# Patient Record
Sex: FEMALE | NOT HISPANIC OR LATINO | Employment: FULL TIME | URBAN - METROPOLITAN AREA
[De-identification: names, ages, dates, MRNs, and addresses within clinical notes are randomized per-mention and may not be internally consistent; named-entity substitution may affect disease eponyms.]

---

## 2017-04-14 ENCOUNTER — ALLIED HEALTH/NURSE VISIT (OUTPATIENT)
Dept: NURSING | Facility: CLINIC | Age: 42
End: 2017-04-14
Payer: COMMERCIAL

## 2017-04-14 DIAGNOSIS — E53.8 B12 DEFICIENCY: Primary | ICD-10-CM

## 2017-04-14 PROCEDURE — 99207 ZZC NO CHARGE NURSE ONLY: CPT

## 2017-04-14 PROCEDURE — 96372 THER/PROPH/DIAG INJ SC/IM: CPT

## 2017-04-14 NOTE — PROGRESS NOTES
The following medication was given:     MEDICATION: Vitamin B12  1000mcg  ROUTE: IM  SITE: Deltoid - Left  DOSE: 1 ml  LOT #: [id number]  :  [employer]  EXPIRATION DATE:  7/2018  NDC#: 9518-7517-59  PROVIDER: Anh Brooks CMA

## 2017-04-14 NOTE — MR AVS SNAPSHOT
After Visit Summary   4/14/2017    Jeanne Wasserman    MRN: [id number]           Patient Information     Date Of Birth          1975        Visit Information        Provider Department      4/14/2017 3:30 PM BE ANCILLARY [facility]        Today's Diagnoses     B12 deficiency    -  1       Follow-ups after your visit        Who to contact     If you have questions or need follow up information about today's clinic visit or your schedule please contact [facility] [place] directly at [phone].  Normal or non-critical lab and imaging results will be communicated to you by MyChart, letter or phone within 4 business days after the clinic has received the results. If you do not hear from us within 7 days, please contact the clinic through [employer] or phone. If you have a critical or abnormal lab result, we will notify you by phone as soon as possible.  Submit refill requests through [employer] or call your pharmacy and they will forward the refill request to us. Please allow 3 business days for your refill to be completed.          Additional Information About Your Visit        MyChart Information     [employer] gives you secure access to your electronic health record. If you see a primary care provider, you can also send messages to your care team and make appointments. If you have questions, please call your primary care clinic.  If you do not have a primary care provider, please call [phone] and they will assist you.        Care EveryWhere ID     This is your Care EveryWhere ID. This could be used by other organizations to access your [facility] medical records  [id number]         Blood Pressure from Last 3 Encounters:   12/26/16 120/72   12/06/16 115/75   11/21/16 129/85    Weight from Last 3 Encounters:   06/16/16 197 lb (89.4 kg)   06/06/16 197 lb (89.4 kg)              We Performed the Following     B12 - 1000 MCG     INJECTION INTRAMUSCULAR OR SUB-Q        Primary Care Provider  Office Phone # Fax #    Anh Cortez PA-C [phone]       [facility] [address]  [place] [address]        Thank you!     [facility] you [facility] your care. Our goal is always to provide you with excellent care. Hearing back from our patients is one way we can continue to improve our services. Please take a few minutes to complete the written survey that you may receive in the mail after your visit with us. Thank you!             Your Updated Medication List - Protect others around you: Learn how to safely use, store and throw away your medicines at www.disposemymeds.org.          This list is accurate as of: 4/14/17  3:45 PM.  Always use your most recent med list.                   Brand Name Dispense Instructions for use    acetaminophen-caffeine 500-65 MG Tabs    EXCEDRIN TENSION HEADACHE     Take 2 tablets by mouth as needed for mild pain       cyanocobalamin 1000 MCG/ML injection    VITAMIN B12    1 mL    Inject 1 mL (1,000 mcg) into the muscle every 30 days for 12 doses       ferrous fumarate 65 mg ([place]. FE)-Vitamin C 125 mg  MG Tabs tablet    VITRON C    90 tablet    Take 1 tablet by mouth daily       Multi-vitamin Tabs tablet      Take 1 tablet by mouth daily       silver sulfADIAZINE 1 % cream    SILVADENE    25 g    Apply topically daily       topiramate 200 MG tablet    TOPAMAX    60 tablet    Take 1 tablet (200 mg) by mouth 2 times daily

## 2017-05-26 ENCOUNTER — ALLIED HEALTH/NURSE VISIT (OUTPATIENT)
Dept: NURSING | Facility: CLINIC | Age: 42
End: 2017-05-26
Payer: COMMERCIAL

## 2017-05-26 DIAGNOSIS — D50.9 IRON DEFICIENCY ANEMIA, UNSPECIFIED IRON DEFICIENCY ANEMIA TYPE: ICD-10-CM

## 2017-05-26 DIAGNOSIS — Z98.84 HISTORY OF GASTRIC BYPASS: ICD-10-CM

## 2017-05-26 DIAGNOSIS — D50.9 IRON DEFICIENCY ANEMIA: Primary | ICD-10-CM

## 2017-05-26 DIAGNOSIS — E53.8 B12 DEFICIENCY: ICD-10-CM

## 2017-05-26 LAB
BASOPHILS # BLD AUTO: 0 10E9/L (ref 0–0.2)
BASOPHILS NFR BLD AUTO: 0.3 %
DIFFERENTIAL METHOD BLD: ABNORMAL
EOSINOPHIL # BLD AUTO: 0 10E9/L (ref 0–0.7)
EOSINOPHIL NFR BLD AUTO: 0.6 %
ERYTHROCYTE [DISTWIDTH] IN BLOOD BY AUTOMATED COUNT: 18.7 % (ref 10–15)
FERRITIN SERPL-MCNC: 6 NG/ML (ref 12–150)
HCT VFR BLD AUTO: 36.2 % (ref 35–47)
HGB BLD-MCNC: 11.4 G/DL (ref 11.7–15.7)
IRON SATN MFR SERPL: 9 % (ref 15–46)
IRON SERPL-MCNC: 41 UG/DL (ref 35–180)
LYMPHOCYTES # BLD AUTO: 2.7 10E9/L (ref 0.8–5.3)
LYMPHOCYTES NFR BLD AUTO: 37.4 %
MCH RBC QN AUTO: 23.7 PG (ref 26.5–33)
MCHC RBC AUTO-ENTMCNC: 31.5 G/DL (ref 31.5–36.5)
MCV RBC AUTO: 75 FL (ref 78–100)
MONOCYTES # BLD AUTO: 0.8 10E9/L (ref 0–1.3)
MONOCYTES NFR BLD AUTO: 10.5 %
NEUTROPHILS # BLD AUTO: 3.7 10E9/L (ref 1.6–8.3)
NEUTROPHILS NFR BLD AUTO: 51.2 %
PLATELET # BLD AUTO: 271 10E9/L (ref 150–450)
RBC # BLD AUTO: 4.82 10E12/L (ref 3.8–5.2)
TIBC SERPL-MCNC: 438 UG/DL (ref 240–430)
VIT B12 SERPL-MCNC: >6000 PG/ML (ref 193–986)
WBC # BLD AUTO: 7.2 10E9/L (ref 4–11)

## 2017-05-26 PROCEDURE — 83550 IRON BINDING TEST: CPT | Performed by: PHYSICIAN ASSISTANT

## 2017-05-26 PROCEDURE — 36415 COLL VENOUS BLD VENIPUNCTURE: CPT | Performed by: PHYSICIAN ASSISTANT

## 2017-05-26 PROCEDURE — 82728 ASSAY OF FERRITIN: CPT | Performed by: PHYSICIAN ASSISTANT

## 2017-05-26 PROCEDURE — 83540 ASSAY OF IRON: CPT | Performed by: PHYSICIAN ASSISTANT

## 2017-05-26 PROCEDURE — 96372 THER/PROPH/DIAG INJ SC/IM: CPT

## 2017-05-26 PROCEDURE — 82607 VITAMIN B-12: CPT | Performed by: PHYSICIAN ASSISTANT

## 2017-05-26 PROCEDURE — 99207 ZZC NO CHARGE NURSE ONLY: CPT

## 2017-05-26 PROCEDURE — 85025 COMPLETE CBC W/AUTO DIFF WBC: CPT | Performed by: PHYSICIAN ASSISTANT

## 2017-05-26 RX ORDER — CYANOCOBALAMIN 1000 UG/ML
1 INJECTION, SOLUTION INTRAMUSCULAR; SUBCUTANEOUS
Qty: 1 ML | Refills: 11 | OUTPATIENT
Start: 2017-05-26 | End: 2017-05-30

## 2017-05-26 NOTE — PROGRESS NOTES
The following medication was given:     MEDICATION: Vitamin B12  1000mcg  ROUTE: IM  SITE: Deltoid - Right  DOSE: 1 ml  LOT #: [id number]  :  [address]  EXPIRATION DATE:  9/2018  NDC#: 7739-8035-61  TANIYA Brooks

## 2017-05-26 NOTE — MR AVS SNAPSHOT
After Visit Summary   5/26/2017    Jeanne Wasserman    MRN: [id number]           Patient Information     Date Of Birth          1975        Visit Information        Provider Department      5/26/2017 2:45 PM [facility]        Today's Diagnoses     Iron deficiency anemia    -  1       Follow-ups after your visit        Your next 10 appointments already scheduled     May 26, 2017  2:45 PM CDT   Nurse Only with BE ANCILLARY   [facility] [place] ([facility] [place])    [address]  [place] [address] [phone]              Who to contact     If you have questions or need follow up information about today's clinic visit or your schedule please contact [facility] directly at [phone].  Normal or non-critical lab and imaging results will be communicated to you by [employer], letter or phone within 4 business days after the clinic has received the results. If you do not hear from us within 7 days, please contact the clinic through [employer] or phone. If you have a critical or abnormal lab result, we will notify you by phone as soon as possible.  Submit refill requests through [employer] or call your pharmacy and they will forward the refill request to us. Please allow 3 business days for your refill to be completed.          Additional Information About Your Visit        MyChart Information     [employer] gives you secure access to your electronic health record. If you see a primary care provider, you can also send messages to your care team and make appointments. If you have questions, please call your primary care clinic.  If you do not have a primary care provider, please call [phone] and they will assist you.        Care EveryWhere ID     This is your Care EveryWhere ID. This could be used by other organizations to access your [facility] medical records  [id number]         Blood Pressure from Last 3 Encounters:   12/26/16 120/72    12/06/16 115/75   11/21/16 129/85    Weight from Last 3 Encounters:   06/16/16 197 lb (89.4 kg)   06/06/16 197 lb (89.4 kg)              We Performed the Following     B12 - 1000 MCG     INJECTION INTRAMUSCULAR OR SUB-Q        Primary Care Provider Office Phone # Fax #    Anh Cortez PA-C [phone]       [facility] [address] [facility] [address]        Thank you!     Thank you for choosing [facility]  for your care. Our goal is always to provide you with excellent care. Hearing back from our patients is one way we can continue to improve our services. Please take a few minutes to complete the written survey that you may receive in the mail after your visit with us. Thank you!             Your Updated Medication List - Protect others around you: Learn how to safely use, store and throw away your medicines at www.disposemymeds.org.          This list is accurate as of: 5/26/17  2:38 PM.  Always use your most recent med list.                   Brand Name Dispense Instructions for use    acetaminophen-caffeine 500-65 MG Tabs    EXCEDRIN TENSION HEADACHE     Take 2 tablets by mouth as needed for mild pain       cyanocobalamin 1000 MCG/ML injection    VITAMIN B12    1 mL    Inject 1 mL (1,000 mcg) into the muscle every 30 days for 12 doses       ferrous fumarate 65 mg ([place]. FE)-Vitamin C 125 mg  MG Tabs tablet    VITRON C    90 tablet    Take 1 tablet by mouth daily       Multi-vitamin Tabs tablet      Take 1 tablet by mouth daily       silver sulfADIAZINE 1 % cream    SILVADENE    25 g    Apply topically daily       topiramate 200 MG tablet    TOPAMAX    60 tablet    Take 1 tablet (200 mg) by mouth 2 times daily

## 2017-06-03 ENCOUNTER — HEALTH MAINTENANCE LETTER (OUTPATIENT)
Age: 42
End: 2017-06-03

## 2017-10-19 ENCOUNTER — MYC MEDICAL ADVICE (OUTPATIENT)
Dept: FAMILY MEDICINE | Facility: CLINIC | Age: 42
End: 2017-10-19

## 2017-10-19 DIAGNOSIS — M54.2 NECK PAIN: Primary | ICD-10-CM

## 2017-10-20 ENCOUNTER — TELEPHONE (OUTPATIENT)
Dept: PALLIATIVE MEDICINE | Facility: CLINIC | Age: 42
End: 2017-10-20

## 2017-10-20 NOTE — TELEPHONE ENCOUNTER
Pre-screening Questions for Radiology Injections:    Injection to be done at which interventional clinic site? [facility]    Procedure ordered by BILLIE OSEGUERA    Procedure ordered? Cervical TBD    What insurance would patient like us to bill for this procedure? PO      Worker's comp-Any injection DO NOT SCHEDULE and route to Mamta Mckenzie.      [employer] insurance - For SI joint injections, DO NOT SCHEDULE and route Lien Granados.      [employer]- MBB's must be scheduled at LEAST two weeks apart      [employer] - Any injection besides hip/shoulder/knee joint DO NOT SCHEDULE and route to Lien Granados. She will obtain PA and call pt back to schedule procedure or notify pt of denial.     HP [employer]-PA REQUIRED FOR NON-JOVAN OR Joint injections    Any chance of pregnancy? NO   If YES, do NOT schedule and route to RN pool    Is an  needed? No     Patient has a drive home? (mandatory) YES:     Is patient taking any blood thinners (plavix, coumadin, jantoven, warfarin, heparin, pradaxa or dabigatran )? No   If hold needed, do NOT schedule, route to RN pool     Is patient taking any aspirin products? No     If more than 325mg/day do NOT schedule; route to RN pool     For CERVICAL procedures, hold all aspirin products for 6 days.      Does the patient have a bleeding or clotting disorder? No     If YES, okay to schedule AND route to RN nurse pool    **For any patients with platelet count <100, must be forwarded to provider**    Is patient diabetic?  No  If YES, have them bring their glucometer.    Does patient have an active infection or treated for one within the past week? No     Is patient currently taking any antibiotics?  No     For patients on chronic, preventative, or prophylactic antibiotics, procedures may be scheduled.     For patients on antibiotics for active or recent infection:    Eduarda Amos Burton, Snitzer-antibiotic course must have been completed  for 4 days    Dr. Marino-antibiotic course must have been completed for 7 days    Is patient currently taking any steroid medications? (i.e. Prednisone, Medrol)  No     For patients on steroid medications:    Eduarda Amos Burton, [place]itzer-steroid course must have been completed for 4 days    -steroid course must have been completed for 7 days    Reviewed with patient:  If you are started on any steroids or antibiotics between now and your appointment, you must contact us because it may affect our ability to perform your procedure.  Yes    Is patient actively being treated for cancer or immunocompromised? No  If YES, do NOT schedule and route to RN pool     Are you able to get on and off an exam table with minimal or no assistance? Yes  If NO, do NOT schedule and route to RN pool    Are you able to roll over and lay on your stomach with minimal or no assistance? Yes  If NO, do NOT schedule and route to RN pool     Any allergies to contrast dye, iodine, shellfish, or numbing and steroid medications? No  If YES, route to RN pool AND add allergy information to appointment notes    Allergies: Morphine      Has the patient had a flu shot or any other vaccinations within 7 days before or after the procedure.  No     Does patient have an MRI/CT?  YES:   (SI joint, hip injections, lumbar sympathetic blocks, and stellate ganglion blocks do not require an MRI)    Was the MRI done w/in the last 3 years?  Yes    Was MRI done at [place]? Yes      If not, where was it done? N/A       If MRI was not done at [place], [facility] or [facility] do NOT schedule and route to nursing.  If pt has an imaging disc, the injection may be scheduled but pt has to bring disc to appt. If they show up w/out disc the injection cannot be done    Reminders (please tell patient if applicable):       Instructed pt to arrive 30 minutes early for IV start if this is for a cervical procedure, ALL sympathetic (stellate ganglion, hypogastric,  or lumbar sympathetic block) and all sedation procedures (RFA, spinal cord stimulation trials).  YES:    -IVs are not routinely placed for Dr. Laboy cervical cases   -Dr. Duron: IVs for cervical ESIs and cervical TBDs (not CMBBs/facet inj)      If NPO for sedation, informed patient that it is okay to take medications with sips of water (except if they are to hold blood thinners).  YES:    *DO take blood pressure medication if it is prescribed*      If this is for a cervical JOVAN, informed patient that aspirin needs to be held for 6 days.   Not Applicable      For all patients not having spinal cord stimulator (SCS) trials or radiofrequency ablations (RFAs), informed patient:    IV sedation is not provided for this procedure.  If you feel that an oral anti-anxiety medication is needed, you can discuss this further with your referring provider or primary care provider.  The Pain Clinic provider will discuss specifics of what the procedure includes at your appointment.  Most procedures last 10-20 minutes.  We use numbing medications to help with any discomfort during the procedure.  Not Applicable      Do not schedule procedures requiring IV placement in the first appointment of the day or first appointment after lunch.       For patients 85 or older we recommend having an adult stay w/ them for the remainder of the day.       Does the patient have any questions?    Lien Granados  [facility]

## 2017-11-20 ENCOUNTER — MYC MEDICAL ADVICE (OUTPATIENT)
Dept: PALLIATIVE MEDICINE | Facility: CLINIC | Age: 42
End: 2017-11-20

## 2017-11-20 NOTE — TELEPHONE ENCOUNTER
Spoke w/ pt.   She is unsure which procedure she wants.   She c/o neck, shoulder and arm pain.  Told her to keep tomorrow's appointment and a cervical epidural steroid injection can be done unless Dr. Lee Ann Burger assess at visit that this is not indicated.    Evy Shaver, RN-BSN  [facility]

## 2017-11-20 NOTE — TELEPHONE ENCOUNTER
Pt is scheduled for a cervical TBD injection tomorrow.  More information is needed as she had a cervical radiofrequency ablation 4/2016.  Per pt notes she wants a repeat facet injection but she may mean radiofrequency ablation.  If this is the case the injection for tomorrow needs to be cancelled and her insurance will need to be checked for coverage.    Called pt and left message asking to call the appointment line and ask to be transferred to Evy.    The Bully Trackert sent to pt:    From: Evy Shaver RN        Created: 11/20/2017 3:54 PM         Ezra Angel.  More information is needed regarding your injection for tomorrow.  Are you wanting a repeat cervical radiofrequency ablation or a cervical epidural steroid injection?  If you want a cervical epidural steroid injection then you are good to go for tomorrow.  If you want an radiofrequency ablation then tomorrow's appointment needs to be cancelled as we would need to check your insurance coverage and get approval first.  Unfortunately it is the end of the day so if you can get back to us right away via [employer] or you can call the appointment line and ask to be transferred to Evy.    Evy Shaver RN-BSN  [facility]

## 2017-11-21 ENCOUNTER — RADIOLOGY INJECTION OFFICE VISIT (OUTPATIENT)
Dept: PALLIATIVE MEDICINE | Facility: CLINIC | Age: 42
End: 2017-11-21
Payer: COMMERCIAL

## 2017-11-21 ENCOUNTER — RADIANT APPOINTMENT (OUTPATIENT)
Dept: RADIOLOGY | Facility: CLINIC | Age: 42
End: 2017-11-21
Attending: ANESTHESIOLOGY

## 2017-11-21 VITALS — OXYGEN SATURATION: 98 % | HEART RATE: 72 BPM | DIASTOLIC BLOOD PRESSURE: 77 MMHG | SYSTOLIC BLOOD PRESSURE: 126 MMHG

## 2017-11-21 DIAGNOSIS — M54.12 CERVICAL RADICULOPATHY: Primary | ICD-10-CM

## 2017-11-21 DIAGNOSIS — M50.30 DDD (DEGENERATIVE DISC DISEASE), CERVICAL: ICD-10-CM

## 2017-11-21 DIAGNOSIS — M54.2 NECK PAIN: ICD-10-CM

## 2017-11-21 PROCEDURE — 64479 NJX AA&/STRD TFRM EPI C/T 1: CPT | Performed by: ANESTHESIOLOGY

## 2017-11-21 ASSESSMENT — PAIN SCALES - GENERAL: PAINLEVEL: MODERATE PAIN (4)

## 2017-11-21 NOTE — PROGRESS NOTES
Pre procedure Diagnosis: cervical radiculopathy, cervical stenosis, cervical degenerative disc disease   Post procedure Diagnosis: Same  Procedure performed: cervical transforaminal epidural steroid injection at C5-6 on the right, fluoroscopically guided, contrast controlled  Anesthesia: none  Complications: none  Operators: Esdras Burger MD      Indications:   Jeanne Wasserman is a 42 year old year old female was sent by Anh Cortez PA-C for cervical epidural steroid injection. They have a history of chronic neck pain with pain radiating to the back of the right shoulder and down the right upper extremity . Exam shows mildly decreased cervical range of motion, cervical paraspinal muscle tenderness, and Spurling's was not performed.  They have tried conservative treatment including physical therapy, activity modifications, medications, and previous interventional procedures.     MRI cervical spine was done on 10/9/15 which was read as:  FINDINGS: There is normal alignment of the cervical vertebrae.  Vertebral body heights of the cervical spine are normal. Marrow signal  throughout the cervical vertebrae is within normal limits. There is no  evidence for fracture or pathologic bony lesion of the cervical spine.        There is loss of disc height, disc desiccation and posterior disc  bulging to varying degrees at all levels of the cervical spine with  exception of the normal appearing C7-T1 disc.      The cervical spinal cord is normal in contour and signal intensity.  There is no evidence for intrathecal abnormality.     Level by level:     C2-C3: There is facet arthropathy bilaterally, uncinate hypertrophy  bilaterally and a posterior broad-based disc-osteophyte complex. There  is no spinal canal or neural foraminal stenosis at this level.     C3-C4: There is facet arthropathy bilaterally, uncinate hypertrophy  bilaterally and a posterior broad-based disc-osteophyte complex. There  is no spinal canal or  neural foraminal stenosis at this level.     C4-C5: There is facet arthropathy bilaterally, uncinate hypertrophy  bilaterally and a posterior broad-based disc-osteophyte complex. There  is no spinal canal or neural foraminal stenosis at this level.     C5-C6: There is facet arthropathy bilaterally, uncinate hypertrophy  bilaterally and a posterior broad-based disc-osteophyte complex with a  superimposed small posterior disc herniation (extrusion). The  herniated disc material extends cephalad from the parent disc in the  anterior midline epidural space. These findings result in mild right  foraminal narrowing and mild spinal canal narrowing, but no left  foraminal stenosis.     C6-C7: There is facet arthropathy bilaterally, uncinate hypertrophy  bilaterally and a posterior broad-based disc-osteophyte complex. There  is no spinal canal or neural foraminal stenosis at this level.     C7-T1: There is no spinal canal or neural foraminal stenosis at this  level.     IMPRESSION  IMPRESSION: Degenerative changes of the cervical spine as described  above.     Options/alternatives, benefits and risks were discussed with the patient including bleeding, infection, tissue trauma, numbness, weakness, paralysis, spinal cord injury, radiation exposure, headache and reaction to medications. Questions were answered to his satisfaction and he agrees to proceed. Voluntary informed consent was obtained and signed.      Vitals were reviewed: Yes  /77  Pulse 76  Allergies were reviewed: Yes   Medications were reviewed: Yes   Pre-procedure pain score: 3-4/10     Procedure:  After getting informed consent, the patient was brought into the procedure suite and was placed in a supine position on the procedure table. A Pause for the Cause was performed. Patient was prepped and draped in sterile fashion.       The right C5-6 neuroforamen was identified with the C-arm rotated to a right lateral oblique angle. A total of 1 ml of Lidocaine  1% was used to anesthetize the skin and the needle track at a skin entry site coaxial with the fluoroscopy beam, and overriding the posterior aspect of the neuroforamen. A 27 gauge 3.5 inch spinal needle was advanced towards the posterior aspect of the neuroforamen, first making contact with the superior articular process of C6 with the image intensifier in an oblique view and then it was advanced approximately 1-2 mm further into the foramen with the image intensifier in a PA view. Aspiration was negative for blood or CSF.       Needle position was then verified by injecting 1 ml of Omnipaque-300 utilizing real time fluoroscopy which showed good needle placement and adequate spread along the C6 nerve root and medially into the neuroforamen and epidural space without signs of intravascular or intrathecal uptake. 9 ml of Omnipaque was wasted      Then, after repeated negative aspiration, 2 ml of a combination of 1ml of 2% Lidocaine and 10 mg of dexamethasone was injected. The needle was removed.      During the procedure, there was not a paresthesia.      Hemostasis was achieved, the area was cleaned, and bandaids were placed when appropriate.  The patient tolerated the procedure well, and was taken to the recovery room.   Images were saved to PACS.     Post-procedure pain score: 1/10  Follow-up includes:   -f/u phone call in one week  -f/u with referring provider     Esdras Burger MD  [facility]

## 2017-11-21 NOTE — PATIENT INSTRUCTIONS
[facility]   Procedure Discharge Instructions    Today you saw:    Dr. Esdras Burger      You had an:  Right C6 Cervical transforaminal epidural steroid injection     Medications used:  Lidocaine 1% & 2%      Dexamethasone   Omnipaque   Normal saline            Be cautious when walking. Numbness and/or weakness in the upper extremities may occur for up to 6-8 hours after the procedure due to effect of the local anesthetic    Do not drive for 6 hours. The effect of the local anesthetic could slow your reflexes.     You may resume your regular activities after 24 hours    Avoid strenuous activity for the first 24 hours    You may shower, however avoid swimming, tub baths or hot tubs for 24 hours following your procedure    You may have a mild to moderate increase in pain for several days following the injection.    It may take up to 14 days for the steroid medication to start working although you may feel the effect as early as a few days after the procedure.       You may use ice packs for 10-15 minutes, 3 to 4 times a day at the injection site for comfort    Do not use heat to painful areas for 6 to 8 hours. This will give the local anesthetic time to wear off and prevent you from accidentally burning your skin.     You may use anti-inflammatory medications (such as Ibuprofen or Aleve or Advil) or Tylenol for pain control if necessary    If you were fasting, you may resume your normal diet and medications after the procedure    If you have diabetes, check your blood sugar more frequently than usual as your blood sugar may be higher than normal for 10-14 days following a steroid injection. Contact your doctor who manages your diabetes if your blood sugar is higher than usual    If you experience any of the following, call the pain center nursing line during work hours at [phone] or the after hours provider line at [phone]:  -Fever over 100 degree F  -Swelling, bleeding, redness,  drainage, warmth at the injection site  -Progressive weakness or numbness in your legs or arms  -Loss of bowel or bladder function  -Unusual headache that is not relieved by Tylenol  -Unusual new onset of pain that is not improving      Phone #s:  Appointment line: [phone];  Nurse line: [phone]

## 2017-11-21 NOTE — MR AVS SNAPSHOT
After Visit Summary   11/21/2017    Jeanne Wasserman    MRN: [id number]           Patient Information     Date Of Birth          1975        Visit Information        Provider Department      11/21/2017 10:45 AM Esdras Parry MD [facility]        Care Instructions    [facility]   Procedure Discharge Instructions    Today you saw:    Dr. Esdras Burger      You had an:  Right C6 Cervical transforaminal epidural steroid injection     Medications used:  Lidocaine 1% & 2%      Dexamethasone   Omnipaque   Normal saline            Be cautious when walking. Numbness and/or weakness in the upper extremities may occur for up to 6-8 hours after the procedure due to effect of the local anesthetic    Do not drive for 6 hours. The effect of the local anesthetic could slow your reflexes.     You may resume your regular activities after 24 hours    Avoid strenuous activity for the first 24 hours    You may shower, however avoid swimming, tub baths or hot tubs for 24 hours following your procedure    You may have a mild to moderate increase in pain for several days following the injection.    It may take up to 14 days for the steroid medication to start working although you may feel the effect as early as a few days after the procedure.       You may use ice packs for 10-15 minutes, 3 to 4 times a day at the injection site for comfort    Do not use heat to painful areas for 6 to 8 hours. This will give the local anesthetic time to wear off and prevent you from accidentally burning your skin.     You may use anti-inflammatory medications (such as Ibuprofen or Aleve or Advil) or Tylenol for pain control if necessary    If you were fasting, you may resume your normal diet and medications after the procedure    If you have diabetes, check your blood sugar more frequently than usual as your blood sugar may be higher than normal for 10-14 days following a steroid injection.  Contact your doctor who manages your diabetes if your blood sugar is higher than usual    If you experience any of the following, call the pain center nursing line during work hours at [phone] or the after hours provider line at [phone]:  -Fever over 100 degree F  -Swelling, bleeding, redness, drainage, warmth at the injection site  -Progressive weakness or numbness in your legs or arms  -Loss of bowel or bladder function  -Unusual headache that is not relieved by Tylenol  -Unusual new onset of pain that is not improving      Phone #s:  Appointment line: [phone];  Nurse line: [phone]              Follow-ups after your visit        Who to contact     If you have questions or need follow up information about today's clinic visit or your schedule please contact [facility] [place] directly at [phone].  Normal or non-critical lab and imaging results will be communicated to you by MyChart, letter or phone within 4 business days after the clinic has received the results. If you do not hear from us within 7 days, please contact the clinic through [employer] or phone. If you have a critical or abnormal lab result, we will notify you by phone as soon as possible.  Submit refill requests through [employer] or call your pharmacy and they will forward the refill request to us. Please allow 3 business days for your refill to be completed.          Additional Information About Your Visit        MyChart Information     [employer] gives you secure access to your electronic health record. If you see a primary care provider, you can also send messages to your care team and make appointments. If you have questions, please call your primary care clinic.  If you do not have a primary care provider, please call [phone] and they will assist you.        Care EveryWhere ID     This is your Care EveryWhere ID. This could be used by other organizations to access your [facility] medical records  [id number]        Your  Vitals Were     Pulse                   76            Blood Pressure from Last 3 Encounters:   11/21/17 119/77   12/26/16 120/72   12/06/16 115/75    Weight from Last 3 Encounters:   06/16/16 89.4 kg (197 lb)   06/06/16 89.4 kg (197 lb)              Today, you had the following     No orders found for display       Primary Care Provider Office Phone # Fax #    Anh Cortez PA-C [phone]       [address] [facility] [address]        Equal Access to Services     [facility]: Hadii aad ku hadasho Soomaali, waaxda luqadaha, qaybta kaalmada adeegyasilas, alexia presley . So [facility] [phone].    ATENCIÓN: Si habla español, tiene a temple disposición servicios gratuitos de asistencia lingüística. [facility] [phone].    We comply with applicable federal civil rights laws and [place] laws. We do not discriminate on the basis of race, color, national origin, age, disability, sex, sexual orientation, or gender identity.            Thank you!     Thank you for choosing [facility]  for your care. Our goal is always to provide you with excellent care. Hearing back from our patients is one way we can continue to improve our services. Please take a few minutes to complete the written survey that you may receive in the mail after your visit with us. Thank you!             Your Updated Medication List - Protect others around you: Learn how to safely use, store and throw away your medicines at www.disposemymeds.org.          This list is accurate as of: 11/21/17 11:56 AM.  Always use your most recent med list.                   Brand Name Dispense Instructions for use Diagnosis    acetaminophen-caffeine 500-65 MG Tabs    EXCEDRIN TENSION HEADACHE     Take 2 tablets by mouth as needed for mild pain        cyanocobalamin 1000 MCG/ML injection    VITAMIN B12    1 mL    Inject 1 mL (1,000 mcg) into the muscle every 2 months    B12 deficiency       ferrous fumarate 65 mg  ([place]. FE)-Vitamin C 125 mg  MG Tabs tablet    VITRON C    90 tablet    Take 1 tablet by mouth daily    Iron deficiency anemia, unspecified iron deficiency       Multi-vitamin Tabs tablet      Take 1 tablet by mouth daily        silver sulfADIAZINE 1 % cream    SILVADENE    25 g    Apply topically daily    Open wound       topiramate 200 MG tablet    TOPAMAX    60 tablet    Take 1 tablet (200 mg) by mouth 2 times daily    Cervical radiculitis, Bilateral occipital neuralgia

## 2017-11-28 ENCOUNTER — TELEPHONE (OUTPATIENT)
Dept: PALLIATIVE MEDICINE | Facility: CLINIC | Age: 42
End: 2017-11-28

## 2017-11-28 NOTE — TELEPHONE ENCOUNTER
Patient had a cervical transforaminal epidural steroid injection at C5-6 on the right on 11/21/17.  Called patient for an update.      Left message that we were calling for an update about how she was doing after the injection.  LM that if she has any problems or questions to call the nurse line at [phone].

## 2018-02-12 ENCOUNTER — ALLIED HEALTH/NURSE VISIT (OUTPATIENT)
Dept: NURSING | Facility: CLINIC | Age: 43
End: 2018-02-12
Payer: COMMERCIAL

## 2018-02-12 ENCOUNTER — E-VISIT (OUTPATIENT)
Dept: FAMILY MEDICINE | Facility: CLINIC | Age: 43
End: 2018-02-12

## 2018-02-12 DIAGNOSIS — D50.9 IRON DEFICIENCY ANEMIA: Primary | ICD-10-CM

## 2018-02-12 DIAGNOSIS — F33.8 SEASONAL AFFECTIVE DISORDER (H): Primary | ICD-10-CM

## 2018-02-12 PROCEDURE — 99444 ZZC PHYSICIAN ONLINE EVALUATION & MANAGEMENT SERVICE: CPT | Performed by: PHYSICIAN ASSISTANT

## 2018-02-12 PROCEDURE — 96372 THER/PROPH/DIAG INJ SC/IM: CPT

## 2018-02-12 PROCEDURE — 99207 ZZC NO CHARGE NURSE ONLY: CPT

## 2018-02-12 ASSESSMENT — PATIENT HEALTH QUESTIONNAIRE - PHQ9
SUM OF ALL RESPONSES TO PHQ QUESTIONS 1-9: 7
SUM OF ALL RESPONSES TO PHQ QUESTIONS 1-9: 7
10. IF YOU CHECKED OFF ANY PROBLEMS, HOW DIFFICULT HAVE THESE PROBLEMS MADE IT FOR YOU TO DO YOUR WORK, TAKE CARE OF THINGS AT HOME, OR GET ALONG WITH OTHER PEOPLE: SOMEWHAT DIFFICULT

## 2018-02-12 NOTE — MR AVS SNAPSHOT
After Visit Summary   2/12/2018    Jeanne Wasserman    MRN: [id number]           Patient Information     Date Of Birth          1975        Visit Information        Provider Department      2/12/2018 3:00 PM [facility] [place] Javed Springer        Today's Diagnoses     Iron deficiency anemia    -  1       Follow-ups after your visit        Who to contact     If you have questions or need follow up information about today's clinic visit or your schedule please contact [facility] [place] directly at [phone].  Normal or non-critical lab and imaging results will be communicated to you by MyChart, letter or phone within 4 business days after the clinic has received the results. If you do not hear from us within 7 days, please contact the clinic through [employer] or phone. If you have a critical or abnormal lab result, we will notify you by phone as soon as possible.  Submit refill requests through [employer] or call your pharmacy and they will forward the refill request to us. Please allow 3 business days for your refill to be completed.          Additional Information About Your Visit        MyChart Information     [employer] gives you secure access to your electronic health record. If you see a primary care provider, you can also send messages to your care team and make appointments. If you have questions, please call your primary care clinic.  If you do not have a primary care provider, please call [phone] and they will assist you.        Care EveryWhere ID     This is your Care EveryWhere ID. This could be used by other organizations to access your [place] medical records  [id number]         Blood Pressure from Last 3 Encounters:   11/21/17 126/77   12/26/16 120/72   12/06/16 115/75    Weight from Last 3 Encounters:   06/16/16 197 lb (89.4 kg)   06/06/16 197 lb (89.4 kg)              We Performed the Following     B12 - 1000 MCG     INJECTION INTRAMUSCULAR OR SUB-Q        Primary Care  Provider Office Phone # Fax #    Anh Cortez PA-C [phone]       [address] [place] [address]        Equal Access to Services     [facility] : Hadii evelni ku tedo Soreinaldoali, waaxda luqadaha, qaybta kaalmada adeegyada, alexia cornellcelia platt. So [facility] [phone].    ATENCIÓN: Si habla español, tiene a temple disposición servicios gratuitos de asistencia lingüística. Llame al [phone].    We comply with applicable federal civil rights laws and [place] laws. We do not discriminate on the basis of race, color, national origin, age, disability, sex, sexual orientation, or gender identity.            Thank you!     Thank you for choosing [facility]  for your care. Our goal is always to provide you with excellent care. Hearing back from our patients is one way we can continue to improve our services. Please take a few minutes to complete the written survey that you may receive in the mail after your visit with us. Thank you!             Your Updated Medication List - Protect others around you: Learn how to safely use, store and throw away your medicines at www.disposemymeds.org.          This list is accurate as of 2/12/18  3:14 PM.  Always use your most recent med list.                   Brand Name Dispense Instructions for use Diagnosis    acetaminophen-caffeine 500-65 MG Tabs    EXCEDRIN TENSION HEADACHE     Take 2 tablets by mouth as needed for mild pain        cyanocobalamin 1000 MCG/ML injection    VITAMIN B12    1 mL    Inject 1 mL (1,000 mcg) into the muscle every 2 months    B12 deficiency       ferrous fumarate 65 mg ([place]. FE)-Vitamin C 125 mg  MG Tabs tablet    VITRON C    90 tablet    Take 1 tablet by mouth daily    Iron deficiency anemia, unspecified iron deficiency       Multi-vitamin Tabs tablet      Take 1 tablet by mouth daily        silver sulfADIAZINE 1 % cream    SILVADENE    25 g    Apply topically daily    Open wound        topiramate 200 MG tablet    TOPAMAX    60 tablet    Take 1 tablet (200 mg) by mouth 2 times daily    Cervical radiculitis, Bilateral occipital neuralgia

## 2018-02-12 NOTE — PROGRESS NOTES
The following medication was given:     MEDICATION: Vitamin B12  1000mcg  ROUTE: IM  SITE: Deltoid - Left  DOSE: 1 ml  LOT #: [id number]  :  [employer]  EXPIRATION DATE:  5/2019  NDC#: 4092-8982-73  Provider: Anh Brooks CMA

## 2018-02-12 NOTE — LETTER
February 12,2018                  To whom it may concern:    Jeanne was seen in our clinic today          Sincerely,      TANIYA Brooks

## 2018-02-13 RX ORDER — BUPROPION HYDROCHLORIDE 150 MG/1
150 TABLET ORAL EVERY MORNING
Qty: 90 TABLET | Refills: 3 | Status: SHIPPED | OUTPATIENT
Start: 2018-02-13 | End: 2018-04-19

## 2018-02-13 ASSESSMENT — PATIENT HEALTH QUESTIONNAIRE - PHQ9: SUM OF ALL RESPONSES TO PHQ QUESTIONS 1-9: 7

## 2018-03-22 ENCOUNTER — E-VISIT (OUTPATIENT)
Dept: FAMILY MEDICINE | Facility: CLINIC | Age: 43
End: 2018-03-22
Payer: COMMERCIAL

## 2018-03-22 DIAGNOSIS — J30.2 ACUTE SEASONAL ALLERGIC RHINITIS, UNSPECIFIED TRIGGER: Primary | ICD-10-CM

## 2018-03-22 PROCEDURE — 99444 ZZC PHYSICIAN ONLINE EVALUATION & MANAGEMENT SERVICE: CPT | Performed by: PHYSICIAN ASSISTANT

## 2018-03-23 RX ORDER — MONTELUKAST SODIUM 10 MG/1
10 TABLET ORAL AT BEDTIME
Qty: 30 TABLET | Refills: 5 | Status: SHIPPED | OUTPATIENT
Start: 2018-03-23 | End: 2018-09-21

## 2018-03-23 RX ORDER — PREDNISONE 20 MG/1
20 TABLET ORAL DAILY
Qty: 5 TABLET | Refills: 0 | Status: SHIPPED | OUTPATIENT
Start: 2018-03-23 | End: 2019-01-21

## 2018-05-24 ENCOUNTER — ALLIED HEALTH/NURSE VISIT (OUTPATIENT)
Dept: NURSING | Facility: CLINIC | Age: 43
End: 2018-05-24
Payer: COMMERCIAL

## 2018-05-24 DIAGNOSIS — Z23 NEED FOR VACCINATION: Primary | ICD-10-CM

## 2018-05-24 PROCEDURE — 96372 THER/PROPH/DIAG INJ SC/IM: CPT

## 2018-05-24 PROCEDURE — 99207 ZZC NO CHARGE NURSE ONLY: CPT

## 2018-05-24 NOTE — MR AVS SNAPSHOT
After Visit Summary   5/24/2018    Jeanne Wasserman    MRN: [id number]           Patient Information     Date Of Birth          1975        Visit Information        Provider Department      5/24/2018 3:15 PM [facility]        Today's Diagnoses     Need for vaccination    -  1       Follow-ups after your visit        Your next 10 appointments already scheduled     May 24, 2018  3:15 PM CDT   Nurse Only with [facility]   [facility] [place] ([facility])    [address]  [place] [address] [phone]              Who to contact     If you have questions or need follow up information about today's clinic visit or your schedule please contact [facility] directly at [phone].  Normal or non-critical lab and imaging results will be communicated to you by MyChart, letter or phone within 4 business days after the clinic has received the results. If you do not hear from us within 7 days, please contact the clinic through MyChart or phone. If you have a critical or abnormal lab result, we will notify you by phone as soon as possible.  Submit refill requests through [employer] or call your pharmacy and they will forward the refill request to us. Please allow 3 business days for your refill to be completed.          Additional Information About Your Visit        MyChart Information     [employer] gives you secure access to your electronic health record. If you see a primary care provider, you can also send messages to your care team and make appointments. If you have questions, please call your primary care clinic.  If you do not have a primary care provider, please call [phone] and they will assist you.        Care EveryWhere ID     This is your Care EveryWhere ID. This could be used by other organizations to access your [place] medical records  [id number]         Blood Pressure from Last 3 Encounters:   11/21/17 126/77    12/26/16 120/72   12/06/16 115/75    Weight from Last 3 Encounters:   06/16/16 197 lb (89.4 kg)   06/06/16 197 lb (89.4 kg)              We Performed the Following     INJECTION INTRAMUSCULAR OR SUB-Q     VITAMIN B12 INJ /1000MCG        Primary Care Provider Office Phone # Fax #    Anh Cortez PA-C [phone]       [address] [facility] [address]        Equal Access to Services     [facility] : Hadii aad ku hadasho Soomaali, waaxda luqadaha, qaybta kaalmada adeegyada, waxay idiin hayaan adeeg kharajeffesron presley . So [facility] [phone].    ATENCIÓN: Si habla español, tiene a temple disposición servicios gratuitos de asistencia lingüística. [facility] [phone].    We comply with applicable federal civil rights laws and [place] laws. We do not discriminate on the basis of race, color, national origin, age, disability, sex, sexual orientation, or gender identity.            Thank you!     Thank you for choosing [facility]  for your care. Our goal is always to provide you with excellent care. Hearing back from our patients is one way we can continue to improve our services. Please take a few minutes to complete the written survey that you may receive in the mail after your visit with us. Thank you!             Your Updated Medication List - Protect others around you: Learn how to safely use, store and throw away your medicines at www.disposemymeds.org.          This list is accurate as of 5/24/18  1:43 PM.  Always use your most recent med list.                   Brand Name Dispense Instructions for use Diagnosis    acetaminophen-caffeine 500-65 MG Tabs    EXCEDRIN TENSION HEADACHE     Take 2 tablets by mouth as needed for mild pain        buPROPion 300 MG 24 hr tablet    WELLBUTRIN XL     Take 1 tablet (300 mg) by mouth every morning    Seasonal affective disorder (H)       cyanocobalamin 1000 MCG/ML injection    VITAMIN B12    1 mL    Inject 1 mL (1,000 mcg) into the muscle  every 2 months    B12 deficiency       ferrous fumarate 65 mg ([place]. FE)-Vitamin C 125 mg  MG Tabs tablet    VITRON C    90 tablet    Take 1 tablet by mouth daily    Iron deficiency anemia, unspecified iron deficiency       montelukast 10 MG tablet    SINGULAIR    30 tablet    Take 1 tablet (10 mg) by mouth At Bedtime    Acute seasonal allergic rhinitis, unspecified trigger       Multi-vitamin Tabs tablet      Take 1 tablet by mouth daily        predniSONE 20 MG tablet    DELTASONE    5 tablet    Take 1 tablet (20 mg) by mouth daily x3 days then a half tab (10mg) daily x4 days    Acute seasonal allergic rhinitis, unspecified trigger       silver sulfADIAZINE 1 % cream    SILVADENE    25 g    Apply topically daily    Open wound       topiramate 200 MG tablet    TOPAMAX    60 tablet    Take 1 tablet (200 mg) by mouth 2 times daily    Cervical radiculitis, Bilateral occipital neuralgia

## 2018-09-14 ENCOUNTER — OFFICE VISIT (OUTPATIENT)
Dept: OBGYN | Facility: CLINIC | Age: 43
End: 2018-09-14
Payer: COMMERCIAL

## 2018-09-14 VITALS — DIASTOLIC BLOOD PRESSURE: 79 MMHG | SYSTOLIC BLOOD PRESSURE: 119 MMHG | HEART RATE: 80 BPM | TEMPERATURE: 97 F

## 2018-09-14 DIAGNOSIS — R10.2 PELVIC PAIN IN FEMALE: Primary | ICD-10-CM

## 2018-09-14 DIAGNOSIS — Z98.890 STATUS POST ENDOMETRIAL ABLATION: ICD-10-CM

## 2018-09-14 DIAGNOSIS — N94.6 DYSMENORRHEA: ICD-10-CM

## 2018-09-14 PROCEDURE — 99203 OFFICE O/P NEW LOW 30 MIN: CPT | Performed by: OBSTETRICS & GYNECOLOGY
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
## 2018-09-14 NOTE — MR AVS SNAPSHOT
After Visit Summary   9/14/2018    Jeanne Wasserman    MRN: [id number]           Patient Information     Date Of Birth          1975        Visit Information        Provider Department      9/14/2018 10:30 AM Agbeh, Cephas Mawuena, MD [facility]        Today's Diagnoses     Pelvic pain in female    -  1    Dysmenorrhea        Status post endometrial ablation          Care Instructions                                                        If you have any questions regarding your visit, Please contact your care team.     [facility] Access Services: [phone]    [facility] CLINIC HOURS TELEPHONE NUMBER       GIOVANY Mann-      Evette De La Fuente-Medical Assistant       Monday-[place]  8:00a.m-4:45 p.m  Tuesday-[place]  9:00a.m-11:45 a.m  Wednesday-[place] 8:00a.m-4:45 p.m.  Thursday-[place]  8:00a.m-4:45 p.m.  Friday-[place]  8:00a.m-4:45 p.m. [facility]  [address]  [place], [place] [address] [phone] ask [facility]  [phone] Fax  Imaging [phone]    [facility] Labor and Delivery  [address] [place] [address] [place] [address]  [phone]    [facility]  [address] [place] [address]  [phone] [facility]  [phone] Fax  Imaging [phone]     Urgent Care locations:    [place]        [place] Monday-Friday  5 pm - 9 pm  Saturday and Sunday   9 am - 5 pm    Monday-Friday   11 am - 9 pm  Saturday and Sunday   9 am - 5 pm   [phone]       If you need a medication refill, please contact your pharmacy. Please allow 3 business days for your refill to be completed.  As always, Thank you for trusting us with your healthcare needs!            Follow-ups after your visit        Your next 10 appointments already scheduled     Sep 18, 2018  5:30 PM CDT   US PELVIC COMPLETE W TRANSVAGINAL with  BEUS1   [facility]    [address] [facility] [address]  [place] [id number] [phone]           How do I prepare for my exam? (Food and drink instructions) Adults: Drink four 8-ounce glasses of fluid an hour before your exam. If you need to empty your bladder before your exam, try to release only a little urine. Then, drink another glass of fluid.  Children: * Children who are potty trained up to 6 years old should drink at least 2 cups (16 oz) of water/non-carbonated beverage 30 minutes prior to the exam. * Children who are 6-10 years should drink at least 3 cups (24 oz) of water/non-carbonated beverage 45 minutes prior to the exam. * Children who are 10 years or older should drink at least 4 cups (32 oz) of water/non-carbonated beverage 45 minutes prior to the exam.  If your child is very uncomfortable or has an urgent need to pee, please notify a technologist; they will try to find out how much longer the wait may be and provide instructions to help relieve the pressure.  What should I wear: Wear comfortable clothes.  How long does the exam take: Most ultrasounds take 30 to 60 minutes.  What should I bring: Bring a list of your medicines, including vitamins, minerals and over-the-counter drugs. It is safest to leave personal items at home.  Do I need a :  No  is needed.  What do I need to tell my doctor: Tell your doctor about any allergies you may have.  What should I do after the exam: No restrictions, You may resume normal activities.  What is this test: An ultrasound uses sound waves to make pictures of the body. Sound waves do not cause pain. The only discomfort may be the pressure of the wand against your skin or full bladder.  Who should I call with questions: If you have any questions, please call the Imaging Department where you will have your exam. Directions, parking instructions, and other information is available on our website, [email or web address].             Sep 21, 2018  3:30 PM CDT   Office Visit with Cephas Mawuena Agbeh, MD   [facility] ([facility])    [address]  [place] [address] [phone]           Bring a current list of meds and any records pertaining to this visit. For Physicals, please bring immunization records and any forms needing to be filled out. Please arrive 10 minutes early to complete paperwork.              Future tests that were ordered for you today     Open Future Orders        Priority Expected Expires Ordered    US Pelvic Complete w Transvaginal Routine  9/14/2019 9/14/2018            Who to contact     If you have questions or need follow up information about today's clinic visit or your schedule please contact [facility] directly at [phone].  Normal or non-critical lab and imaging results will be communicated to you by tomoguideshart, letter or phone within 4 business days after the clinic has received the results. If you do not hear from us within 7 days, please contact the clinic through tomoguideshart or phone. If you have a critical or abnormal lab result, we will notify you by phone as soon as possible.  Submit refill requests through [employer] or call your pharmacy and they will forward the refill request to us. Please allow 3 business days for your refill to be completed.          Additional Information About Your Visit        [employer] Information     [employer] gives you secure access to your electronic health record. If you see a primary care provider, you can also send messages to your care team and make appointments. If you have questions, please call your primary care clinic.  If you do not have a primary care provider, please call [phone] and they will assist you.        Care EveryWhere ID     This is your Care EveryWhere ID. This could be used by other organizations to access your [place] medical records  [id number]        Your Vitals Were     Pulse Temperature  Last Period Breastfeeding?          80 97  F (36.1  C) 09/01/2018 (Approximate) No         Blood Pressure from Last 3 Encounters:   09/14/18 119/79   11/21/17 126/77   12/26/16 120/72    Weight from Last 3 Encounters:   06/16/16 197 lb (89.4 kg)   06/06/16 197 lb (89.4 kg)               Primary Care Provider Office Phone # Fax #    Anh Cortez PA-C [phone]       [address] [facility] [address]        Equal Access to Services     [facility]: Hadii aad ku hadasho Soomaali, waaxda luqadaha, qaybta kaalmada adeegyada, alexia isabel hayaan adelatanya presley . So [facility] [phone].    ATENCIÓN: Si habla español, tiene a temple disposición servicios gratuitos de asistencia lingüística. [facility] [phone].    We comply with applicable federal civil rights laws and [place] laws. We do not discriminate on the basis of race, color, national origin, age, disability, sex, sexual orientation, or gender identity.            Thank you!     Thank you for choosing [facility]  for your care. Our goal is always to provide you with excellent care. Hearing back from our patients is one way we can continue to improve our services. Please take a few minutes to complete the written survey that you may receive in the mail after your visit with us. Thank you!             Your Updated Medication List - Protect others around you: Learn how to safely use, store and throw away your medicines at www.disposemymeds.org.          This list is accurate as of 9/14/18 11:24 AM.  Always use your most recent med list.                   Brand Name Dispense Instructions for use Diagnosis    acetaminophen-caffeine 500-65 MG Tabs    EXCEDRIN TENSION HEADACHE     Take 2 tablets by mouth as needed for mild pain        buPROPion 300 MG 24 hr tablet    WELLBUTRIN XL     Take 1 tablet (300 mg) by mouth every morning    Seasonal affective disorder (H)       cyanocobalamin 1000 MCG/ML injection    VITAMIN B12    1  mL    Inject 1 mL (1,000 mcg) into the muscle every 2 months    B12 deficiency       ferrous fumarate 65 mg ([place]. FE)-Vitamin C 125 mg  MG Tabs tablet    VITRON C    90 tablet    Take 1 tablet by mouth daily    Iron deficiency anemia, unspecified iron deficiency       montelukast 10 MG tablet    SINGULAIR    30 tablet    Take 1 tablet (10 mg) by mouth At Bedtime    Acute seasonal allergic rhinitis, unspecified trigger       Multi-vitamin Tabs tablet      Take 1 tablet by mouth daily        predniSONE 20 MG tablet    DELTASONE    5 tablet    Take 1 tablet (20 mg) by mouth daily x3 days then a half tab (10mg) daily x4 days    Acute seasonal allergic rhinitis, unspecified trigger       silver sulfADIAZINE 1 % cream    SILVADENE    25 g    Apply topically daily    Open wound       topiramate 200 MG tablet    TOPAMAX    60 tablet    Take 1 tablet (200 mg) by mouth 2 times daily    Cervical radiculitis, Bilateral occipital neuralgia

## 2018-09-14 NOTE — PATIENT INSTRUCTIONS
If you have any questions regarding your visit, Please contact your care team.     [facility] [employer] Services: [phone]    [facility] CLINIC HOURS TELEPHONE NUMBER       GIOVANY Mann-      Evette De La Fuente-Medical Assistant       Monday-[place]  8:00a.m-4:45 p.m  Tuesday-[place]  9:00a.m-11:45 a.m  Wednesday-[place] 8:00a.m-4:45 p.m.  Thursday-[place]  8:00a.m-4:45 p.m.  Friday-[place]  8:00a.m-4:45 p.m. [facility]  [address] [place] [address]  [phone] ask [facility]  [phone] Fax  Imaging [phone]    [facility] Labor and Delivery  [address] [place] [address] [place] [address]  [phone]    [facility]  [address] [place] [address]  [phone] [facility]  [phone] Fax  Imaging [phone]     Urgent Care locations:    [place]        [place] Monday-Friday  5 pm - 9 pm  Saturday and Sunday   9 am - 5 pm    Monday-Friday   11 am - 9 pm  Saturday and Sunday   9 am - 5 pm   [phone]       If you need a medication refill, please contact your pharmacy. Please allow 3 business days for your refill to be completed.  As always, Thank you for trusting us with your healthcare needs!

## 2018-09-18 ENCOUNTER — RADIANT APPOINTMENT (OUTPATIENT)
Dept: ULTRASOUND IMAGING | Facility: CLINIC | Age: 43
End: 2018-09-18
Attending: OBSTETRICS & GYNECOLOGY
Payer: COMMERCIAL

## 2018-09-18 DIAGNOSIS — Z98.890 STATUS POST ENDOMETRIAL ABLATION: ICD-10-CM

## 2018-09-18 DIAGNOSIS — N94.6 DYSMENORRHEA: ICD-10-CM

## 2018-09-18 DIAGNOSIS — R10.2 PELVIC PAIN IN FEMALE: ICD-10-CM

## 2018-09-18 PROCEDURE — 76830 TRANSVAGINAL US NON-OB: CPT

## 2018-09-18 PROCEDURE — 76856 US EXAM PELVIC COMPLETE: CPT

## 2018-09-21 ENCOUNTER — OFFICE VISIT (OUTPATIENT)
Dept: OBGYN | Facility: CLINIC | Age: 43
End: 2018-09-21
Payer: COMMERCIAL

## 2018-09-21 VITALS — HEART RATE: 112 BPM | TEMPERATURE: 98.3 F | SYSTOLIC BLOOD PRESSURE: 128 MMHG | DIASTOLIC BLOOD PRESSURE: 84 MMHG

## 2018-09-21 DIAGNOSIS — N94.6 DYSMENORRHEA: Primary | ICD-10-CM

## 2018-09-21 DIAGNOSIS — R10.2 PELVIC PAIN IN FEMALE: ICD-10-CM

## 2018-09-21 DIAGNOSIS — E53.8 B12 DEFICIENCY: ICD-10-CM

## 2018-09-21 DIAGNOSIS — D25.0 INTRAMURAL AND SUBMUCOUS LEIOMYOMA OF UTERUS: ICD-10-CM

## 2018-09-21 DIAGNOSIS — D25.1 INTRAMURAL AND SUBMUCOUS LEIOMYOMA OF UTERUS: ICD-10-CM

## 2018-09-21 PROCEDURE — 99214 OFFICE O/P EST MOD 30 MIN: CPT | Performed by: OBSTETRICS & GYNECOLOGY

## 2018-09-21 RX ORDER — LEVONORGESTREL / ETHINYL ESTRADIOL AND ETHINYL ESTRADIOL 150-30(84)
1 KIT ORAL DAILY
Qty: 91 TABLET | Refills: 3 | Status: SHIPPED | OUTPATIENT
Start: 2018-09-21 | End: 2019-10-10

## 2018-09-21 RX ORDER — CYANOCOBALAMIN 1000 UG/ML
1 INJECTION, SOLUTION INTRAMUSCULAR; SUBCUTANEOUS
Qty: 1 ML | Refills: 6 | Status: SHIPPED | OUTPATIENT
Start: 2018-09-21 | End: 2019-10-10
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
## 2018-09-21 NOTE — MR AVS SNAPSHOT
After Visit Summary   9/21/2018    Jeanne Wasserman    MRN: [id number]           Patient Information     Date Of Birth          1975        Visit Information        Provider Department      9/21/2018 3:30 PM Agbeh, Cephas Mawuena, MD [facility]        Care Instructions                                                        If you have any questions regarding your visit, Please contact your care team.    [employer] Access Services: [phone]      [facility] CLINIC HOURS TELEPHONE NUMBER   Cephas Agbeh, M.D.    TANIYA Angel,     STANISLAV Kuo, STANISLAV             Monday-[place]    8:00a.m-4:45 p.m    Tuesday--[place]     8:00a.m-4:45 p.m.    Thursday-[place]    8:00a.m-4:45 p.m.    Friday-[place]    8:00a.m-4:45 p.m    [facility]   [address] [place] [address]   [phone]-Ask for [facility]   Fax [phone]   [phone]     [facility] Labor and Delivery   [address] [place] [address] [place] [address]   [phone]     [facility]   [address] [facility]   [place] [address]   [phone]   [phone]    Urgent Care locations:    [facility]  Monday-Friday   5 pm - 9 pm   Saturday and Sunday    9 am - 5 pm      Monday-Friday    11 pm - 9 pm  Saturday and Sunday   9 am - 5 pm    [phone]     If you need a medication refill, please contact your pharmacy. Please allow 3 business days for your refill to be completed.  As always, Thank you for trusting us with your healthcare needs!            Follow-ups after your visit        Who to contact     If you have questions or need follow up information about today's clinic visit or your schedule please contact [facility] directly at [phone].  Normal or non-critical lab and imaging results will be communicated to you by MyChart, letter or phone within 4 business days after the clinic has  received the results. If you do not hear from us within 7 days, please contact the clinic through [employer] or phone. If you have a critical or abnormal lab result, we will notify you by phone as soon as possible.  Submit refill requests through [employer] or call your pharmacy and they will forward the refill request to us. Please allow 3 business days for your refill to be completed.          Additional Information About Your Visit        [employer] Information     [employer] gives you secure access to your electronic health record. If you see a primary care provider, you can also send messages to your care team and make appointments. If you have questions, please call your primary care clinic.  If you do not have a primary care provider, please call [phone] and they will assist you.        Care EveryWhere ID     This is your Care EveryWhere ID. This could be used by other organizations to access your [place] medical records  [id number]        Your Vitals Were     Pulse Temperature Last Period             112 98.3  F (36.8  C) (Tympanic) 09/01/2018 (Approximate)          Blood Pressure from Last 3 Encounters:   09/21/18 128/84   09/14/18 119/79   11/21/17 126/77    Weight from Last 3 Encounters:   06/16/16 197 lb (89.4 kg)   06/06/16 197 lb (89.4 kg)              Today, you had the following     No orders found for display         Today's Medication Changes          These changes are accurate as of 9/21/18  4:02 PM.  If you have any questions, ask your nurse or doctor.               Stop taking these medicines if you haven't already. Please contact your care team if you have questions.     buPROPion 300 MG 24 hr tablet   Commonly known as:  WELLBUTRIN XL   Stopped by:  Agbeh, Cephas Mawuena, MD           montelukast 10 MG tablet   Commonly known as:  SINGULAIR   Stopped by:  Agbeh, Cephas Mawuena, MD           silver sulfADIAZINE 1 % cream   Commonly known as:  SILVADENE   Stopped by:  Agbeh, Cephas Mawuena, MD            topiramate 200 MG tablet   Commonly known as:  TOPAMAX   Stopped by:  Agbeh, Cephas Mawuena, MD                    Primary Care Provider Office Phone # Fax #    Anh Cortez PA-C [phone]       [address] [place] [address]        Equal Access to Services     [facility]: Hadii aad ku hadasho Soomaali, waaxda luqadaha, qaybta kaalmada adeegyada, waxay idiin hayaan adeeg kharash la'aan . So [facility] [phone].    ATENCIÓN: Si habla español, tiene a temple disposición servicios gratuitos de asistencia lingüística. [facility] [phone].    We comply with applicable federal civil rights laws and [place] laws. We do not discriminate on the basis of race, color, national origin, age, disability, sex, sexual orientation, or gender identity.            Thank you!     Thank you for choosing [facility]  for your care. Our goal is always to provide you with excellent care. Hearing back from our patients is one way we can continue to improve our services. Please take a few minutes to complete the written survey that you may receive in the mail after your visit with us. Thank you!             Your Updated Medication List - Protect others around you: Learn how to safely use, store and throw away your medicines at www.disposemymeds.org.          This list is accurate as of 9/21/18  4:02 PM.  Always use your most recent med list.                   Brand Name Dispense Instructions for use Diagnosis    acetaminophen-caffeine 500-65 MG Tabs    EXCEDRIN TENSION HEADACHE     Take 2 tablets by mouth as needed for mild pain        cyanocobalamin 1000 MCG/ML injection    VITAMIN B12    1 mL    Inject 1 mL (1,000 mcg) into the muscle every 2 months    B12 deficiency       ferrous fumarate 65 mg ([place]. FE)-Vitamin C 125 mg  MG Tabs tablet    VITRON C    90 tablet    Take 1 tablet by mouth daily    Iron deficiency anemia, unspecified iron deficiency       Multi-vitamin Tabs tablet       Take 1 tablet by mouth daily        predniSONE 20 MG tablet    DELTASONE    5 tablet    Take 1 tablet (20 mg) by mouth daily x3 days then a half tab (10mg) daily x4 days    Acute seasonal allergic rhinitis, unspecified trigger

## 2019-01-12 ENCOUNTER — TRANSFERRED RECORDS (OUTPATIENT)
Dept: HEALTH INFORMATION MANAGEMENT | Facility: CLINIC | Age: 44
End: 2019-01-12

## 2019-01-21 ENCOUNTER — OFFICE VISIT (OUTPATIENT)
Dept: FAMILY MEDICINE | Facility: CLINIC | Age: 44
End: 2019-01-21
Payer: COMMERCIAL

## 2019-01-21 VITALS
SYSTOLIC BLOOD PRESSURE: 129 MMHG | TEMPERATURE: 97.2 F | DIASTOLIC BLOOD PRESSURE: 85 MMHG | HEART RATE: 74 BPM | OXYGEN SATURATION: 97 %

## 2019-01-21 DIAGNOSIS — Z11.4 SCREENING FOR HIV (HUMAN IMMUNODEFICIENCY VIRUS): ICD-10-CM

## 2019-01-21 DIAGNOSIS — Z98.84 HISTORY OF GASTRIC BYPASS: ICD-10-CM

## 2019-01-21 DIAGNOSIS — E66.3 OVERWEIGHT: ICD-10-CM

## 2019-01-21 DIAGNOSIS — D50.8 OTHER IRON DEFICIENCY ANEMIA: ICD-10-CM

## 2019-01-21 DIAGNOSIS — E53.8 B12 DEFICIENCY: ICD-10-CM

## 2019-01-21 DIAGNOSIS — R53.83 FATIGUE, UNSPECIFIED TYPE: ICD-10-CM

## 2019-01-21 DIAGNOSIS — F33.8 SEASONAL AFFECTIVE DISORDER (H): Primary | ICD-10-CM

## 2019-01-21 LAB
BASOPHILS # BLD AUTO: 0 10E9/L (ref 0–0.2)
BASOPHILS NFR BLD AUTO: 0.2 %
DIFFERENTIAL METHOD BLD: NORMAL
EOSINOPHIL # BLD AUTO: 0.1 10E9/L (ref 0–0.7)
EOSINOPHIL NFR BLD AUTO: 0.8 %
ERYTHROCYTE [DISTWIDTH] IN BLOOD BY AUTOMATED COUNT: 14.2 % (ref 10–15)
FERRITIN SERPL-MCNC: 10 NG/ML (ref 12–150)
HCT VFR BLD AUTO: 41.8 % (ref 35–47)
HGB BLD-MCNC: 13.3 G/DL (ref 11.7–15.7)
LYMPHOCYTES # BLD AUTO: 2.2 10E9/L (ref 0.8–5.3)
LYMPHOCYTES NFR BLD AUTO: 36.8 %
MCH RBC QN AUTO: 27.8 PG (ref 26.5–33)
MCHC RBC AUTO-ENTMCNC: 31.8 G/DL (ref 31.5–36.5)
MCV RBC AUTO: 87 FL (ref 78–100)
MONOCYTES # BLD AUTO: 0.7 10E9/L (ref 0–1.3)
MONOCYTES NFR BLD AUTO: 11.2 %
NEUTROPHILS # BLD AUTO: 3.1 10E9/L (ref 1.6–8.3)
NEUTROPHILS NFR BLD AUTO: 51 %
PLATELET # BLD AUTO: 246 10E9/L (ref 150–450)
RBC # BLD AUTO: 4.78 10E12/L (ref 3.8–5.2)
TSH SERPL DL<=0.005 MIU/L-ACNC: 1.6 MU/L (ref 0.4–4)
VIT B12 SERPL-MCNC: 927 PG/ML (ref 193–986)
WBC # BLD AUTO: 6 10E9/L (ref 4–11)

## 2019-01-21 PROCEDURE — 36415 COLL VENOUS BLD VENIPUNCTURE: CPT | Performed by: PHYSICIAN ASSISTANT

## 2019-01-21 PROCEDURE — 82607 VITAMIN B-12: CPT | Performed by: PHYSICIAN ASSISTANT

## 2019-01-21 PROCEDURE — 82728 ASSAY OF FERRITIN: CPT | Performed by: PHYSICIAN ASSISTANT

## 2019-01-21 PROCEDURE — 84443 ASSAY THYROID STIM HORMONE: CPT | Performed by: PHYSICIAN ASSISTANT

## 2019-01-21 PROCEDURE — 85025 COMPLETE CBC W/AUTO DIFF WBC: CPT | Performed by: PHYSICIAN ASSISTANT

## 2019-01-21 PROCEDURE — 99214 OFFICE O/P EST MOD 30 MIN: CPT | Performed by: PHYSICIAN ASSISTANT

## 2019-01-21 RX ORDER — BUPROPION HYDROCHLORIDE 150 MG/1
150 TABLET ORAL EVERY MORNING
Qty: 90 TABLET | Refills: 3 | Status: SHIPPED | OUTPATIENT
Start: 2019-01-21 | End: 2019-10-10

## 2019-01-21 RX ORDER — CYANOCOBALAMIN 1000 UG/ML
1 INJECTION, SOLUTION INTRAMUSCULAR; SUBCUTANEOUS
Qty: 1 ML | Refills: 5 | Status: SHIPPED | OUTPATIENT
Start: 2019-01-21 | End: 2019-01-22

## 2019-01-21 ASSESSMENT — PATIENT HEALTH QUESTIONNAIRE - PHQ9
SUM OF ALL RESPONSES TO PHQ QUESTIONS 1-9: 9
5. POOR APPETITE OR OVEREATING: NOT AT ALL

## 2019-01-21 ASSESSMENT — ANXIETY QUESTIONNAIRES
IF YOU CHECKED OFF ANY PROBLEMS ON THIS QUESTIONNAIRE, HOW DIFFICULT HAVE THESE PROBLEMS MADE IT FOR YOU TO DO YOUR WORK, TAKE CARE OF THINGS AT HOME, OR GET ALONG WITH OTHER PEOPLE: NOT DIFFICULT AT ALL
2. NOT BEING ABLE TO STOP OR CONTROL WORRYING: NOT AT ALL
3. WORRYING TOO MUCH ABOUT DIFFERENT THINGS: NOT AT ALL
7. FEELING AFRAID AS IF SOMETHING AWFUL MIGHT HAPPEN: NOT AT ALL
6. BECOMING EASILY ANNOYED OR IRRITABLE: NOT AT ALL
GAD7 TOTAL SCORE: 0
5. BEING SO RESTLESS THAT IT IS HARD TO SIT STILL: NOT AT ALL
1. FEELING NERVOUS, ANXIOUS, OR ON EDGE: NOT AT ALL
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
## 2019-01-21 NOTE — PATIENT INSTRUCTIONS
Call the [facility] to schedule your iron infusions.   We'll recheck your CBC and ferritin 2 months after your infusion.     Have Paola obtain your weight when you see her.

## 2019-01-21 NOTE — LETTER
My Depression Action Plan  Name: Jeanne Wasserman   Date of Birth 1975  Date: 1/21/2019    My doctor: Anh Cortez   My clinic: [facility]  [address]  [place] [id number] [phone]          GREEN    ZONE   Good Control    What it looks like:     Things are going generally well. You have normal up s and down s. You may even feel depressed from time to time, but bad moods usually last less than a day.   What you need to do:  1. Continue to care for yourself (see self care plan)  2. Check your depression survival kit and update it as needed  3. Follow your physician s recommendations including any medication.  4. Do not stop taking medication unless you consult with your physician first.           YELLOW         ZONE Getting Worse    What it looks like:     Depression is starting to interfere with your life.     It may be hard to get out of bed; you may be starting to isolate yourself from others.    Symptoms of depression are starting to last most all day and this has happened for several days.     You may have suicidal thoughts but they are not constant.   What you need to do:     1. Call your care team, your response to treatment will improve if you keep your care team informed of your progress. Yellow periods are signs an adjustment may need to be made.     2. Continue your self-care, even if you have to fake it!    3. Talk to someone in your support network    4. Open up your depression survival kit           RED    ZONE Medical Alert - Get Help    What it looks like:     Depression is seriously interfering with your life.     You may experience these or other symptoms: You can t get out of bed most days, can t work or engage in other necessary activities, you have trouble taking care of basic hygiene, or basic responsibilities, thoughts of suicide or death that will not go away, self-injurious behavior.     What you need to do:  1. Call your care team and  request a same-day appointment. If they are not available (weekends or after hours) call your local crisis line, emergency room or 911.            Depression Self Care Plan / Survival Kit    Self-Care for Depression  Here s the deal. Your body and mind are really not as separate as most people think.  What you do and think affects how you feel and how you feel influences what you do and think. This means if you do things that people who feel good do, it will help you feel better.  Sometimes this is all it takes.  There is also a place for medication and therapy depending on how severe your depression is, so be sure to consult with your medical provider and/ or Behavioral Health Consultant if your symptoms are worsening or not improving.     In order to better manage my stress, I will:    Exercise  Get some form of exercise, every day. This will help reduce pain and release endorphins, the  feel good  chemicals in your brain. This is almost as good as taking antidepressants!  This is not the same as joining a gym and then never going! (they count on that by the way ) It can be as simple as just going for a walk or doing some gardening, anything that will get you moving.      Hygiene   Maintain good hygiene (Get out of bed in the morning, Make your bed, Brush your teeth, Take a shower, and Get dressed like you were going to work, even if you are unemployed).  If your clothes don't fit try to get ones that do.    Diet  I will strive to eat foods that are good for me, drink plenty of water, and avoid excessive sugar, caffeine, alcohol, and other mood-altering substances.  Some foods that are helpful in depression are: complex carbohydrates, B vitamins, flaxseed, fish or fish oil, fresh fruits and vegetables.    Psychotherapy  I agree to participate in Individual Therapy (if recommended).    Medication  If prescribed medications, I agree to take them.  Missing doses can result in serious side effects.  I understand that  drinking alcohol, or other illicit drug use, may cause potential side effects.  I will not stop my medication abruptly without first discussing it with my provider.    Staying Connected With Others  I will stay in touch with my friends, family members, and my primary care provider/team.    Use your imagination  Be creative.  We all have a creative side; it doesn t matter if it s oil painting, sand castles, or mud pies! This will also kick up the endorphins.    Witness Beauty  (AKA stop and smell the roses) Take a look outside, even in mid-winter. Notice colors, textures. Watch the squirrels and birds.     Service to others  Be of service to others.  There is always someone else in need.  By helping others we can  get out of ourselves  and remember the really important things.  This also provides opportunities for practicing all the other parts of the program.    Humor  Laugh and be silly!  Adjust your TV habits for less news and crime-drama and more comedy.    Control your stress  Try breathing deep, massage therapy, biofeedback, and meditation. Find time to relax each day.     My support system    Clinic Contact:  Phone number:    Contact 1:  Phone number:    Contact 2:  Phone number:    [employer]/:  Phone number:    Therapist:  Phone number:    Local crisis center:    Phone number:    Other community support:  Phone number:

## 2019-01-22 DIAGNOSIS — E53.8 B12 DEFICIENCY: ICD-10-CM

## 2019-01-22 RX ORDER — CYANOCOBALAMIN 1000 UG/ML
1 INJECTION, SOLUTION INTRAMUSCULAR; SUBCUTANEOUS
Qty: 1 ML | Refills: 2
Start: 2019-01-22 | End: 2021-09-09

## 2019-01-22 ASSESSMENT — ANXIETY QUESTIONNAIRES: GAD7 TOTAL SCORE: 0

## 2019-02-06 ENCOUNTER — INFUSION THERAPY VISIT (OUTPATIENT)
Dept: INFUSION THERAPY | Facility: CLINIC | Age: 44
End: 2019-02-06
Payer: COMMERCIAL

## 2019-02-06 VITALS
DIASTOLIC BLOOD PRESSURE: 84 MMHG | HEART RATE: 72 BPM | SYSTOLIC BLOOD PRESSURE: 123 MMHG | RESPIRATION RATE: 18 BRPM | TEMPERATURE: 97.6 F | OXYGEN SATURATION: 100 %

## 2019-02-06 DIAGNOSIS — D50.8 OTHER IRON DEFICIENCY ANEMIA: Primary | ICD-10-CM

## 2019-02-06 PROCEDURE — 96365 THER/PROPH/DIAG IV INF INIT: CPT | Performed by: INTERNAL MEDICINE

## 2019-02-06 PROCEDURE — 99207 ZZC NO CHARGE LOS: CPT

## 2019-02-06 RX ADMIN — Medication 250 ML: at 13:26

## 2019-02-06 NOTE — PROGRESS NOTES
Infusion Nursing Note:  Jeanne Wasserman presents today for Feraheme  Patient seen by provider today: No   present during visit today: Not Applicable.    Note: N/A.    Intravenous Access:  Peripheral IV placed.    Treatment Conditions:  Not Applicable.      Post Infusion Assessment:  Patient tolerated infusion without incident.  Site patent and intact, free from redness, edema or discomfort.  Access discontinued per protocol.    Discharge Plan:    Patient will return 2/14/19 for next appointment.   Patient discharged in stable condition accompanied by: self.  Departure Mode: Ambulatory.    Noy Berrios RN

## 2019-02-14 ENCOUNTER — INFUSION THERAPY VISIT (OUTPATIENT)
Dept: INFUSION THERAPY | Facility: CLINIC | Age: 44
End: 2019-02-14
Payer: COMMERCIAL

## 2019-02-14 VITALS
HEART RATE: 69 BPM | DIASTOLIC BLOOD PRESSURE: 62 MMHG | TEMPERATURE: 98.2 F | RESPIRATION RATE: 18 BRPM | OXYGEN SATURATION: 96 % | SYSTOLIC BLOOD PRESSURE: 118 MMHG

## 2019-02-14 DIAGNOSIS — D50.8 OTHER IRON DEFICIENCY ANEMIA: Primary | ICD-10-CM

## 2019-02-14 PROCEDURE — 96365 THER/PROPH/DIAG IV INF INIT: CPT | Performed by: NURSE PRACTITIONER

## 2019-02-14 PROCEDURE — 99207 ZZC NO CHARGE LOS: CPT

## 2019-02-14 RX ADMIN — Medication 250 ML: at 15:15

## 2019-02-14 ASSESSMENT — PAIN SCALES - GENERAL: PAINLEVEL: NO PAIN (0)

## 2019-02-14 NOTE — PROGRESS NOTES
Infusion Nursing Note:  Jeanne Wasserman presents today for 2/2 Feraheme infusion.    Patient seen by provider today: No   present during visit today: Not Applicable.    Note: Patient states she has not had any side effects from the first infusion.  States she feels like she is less short of breath and her complexion is better.    Intravenous Access:  Peripheral IV placed.    Treatment Conditions:  Not Applicable.    Post Infusion Assessment:  Patient tolerated infusion without incident.  Blood return noted pre and post infusion.  Site patent and intact, free from redness, edema or discomfort.  Patient observed for 30 minutes post infusion without any difficulties.  No evidence of extravasations.  Access discontinued per protocol.    Discharge Plan:   Patient does not need any further infusions at this time. Patient will follow up with labs and primary care in one month.  Patient discharged in stable condition accompanied by: self.  Departure Mode: Ambulatory.    Andressa Bhakta RN

## 2019-02-26 ENCOUNTER — DOCUMENTATION ONLY (OUTPATIENT)
Dept: LAB | Facility: CLINIC | Age: 44
End: 2019-02-26

## 2019-02-26 NOTE — PROGRESS NOTES
Patient returned call, she states she was told to recheck her labs in 2-3 weeks after completing infusion. Spoke with Anh and patient does not need to have lab rechecked for 2-3 months after infusion. Patient verbalized understanding and lab appointment cancelled

## 2019-02-26 NOTE — PROGRESS NOTES
Patient is scheduled for a Lab appointment this afternoon for 'knaeble labs.'  Please place orders needed. If the labs are not needed, please follow up with the patient.    Thank you,   Gisela Samuel)

## 2019-04-15 ENCOUNTER — TELEPHONE (OUTPATIENT)
Dept: FAMILY MEDICINE | Facility: CLINIC | Age: 44
End: 2019-04-15

## 2019-04-15 DIAGNOSIS — D50.9 IRON DEFICIENCY ANEMIA, UNSPECIFIED IRON DEFICIENCY ANEMIA TYPE: Primary | ICD-10-CM

## 2019-04-15 DIAGNOSIS — D50.9 IRON DEFICIENCY ANEMIA, UNSPECIFIED IRON DEFICIENCY ANEMIA TYPE: ICD-10-CM

## 2019-04-15 LAB
BASOPHILS # BLD AUTO: 0 10E9/L (ref 0–0.2)
BASOPHILS NFR BLD AUTO: 0.3 %
DIFFERENTIAL METHOD BLD: ABNORMAL
EOSINOPHIL # BLD AUTO: 0.1 10E9/L (ref 0–0.7)
EOSINOPHIL NFR BLD AUTO: 0.9 %
ERYTHROCYTE [DISTWIDTH] IN BLOOD BY AUTOMATED COUNT: 15.2 % (ref 10–15)
HCT VFR BLD AUTO: 42.3 % (ref 35–47)
HGB BLD-MCNC: 13.8 G/DL (ref 11.7–15.7)
LYMPHOCYTES # BLD AUTO: 2 10E9/L (ref 0.8–5.3)
LYMPHOCYTES NFR BLD AUTO: 27.4 %
MCH RBC QN AUTO: 29.9 PG (ref 26.5–33)
MCHC RBC AUTO-ENTMCNC: 32.6 G/DL (ref 31.5–36.5)
MCV RBC AUTO: 92 FL (ref 78–100)
MONOCYTES # BLD AUTO: 0.8 10E9/L (ref 0–1.3)
MONOCYTES NFR BLD AUTO: 10.7 %
NEUTROPHILS # BLD AUTO: 4.5 10E9/L (ref 1.6–8.3)
NEUTROPHILS NFR BLD AUTO: 60.7 %
PLATELET # BLD AUTO: 212 10E9/L (ref 150–450)
RBC # BLD AUTO: 4.62 10E12/L (ref 3.8–5.2)
WBC # BLD AUTO: 7.5 10E9/L (ref 4–11)

## 2019-04-15 PROCEDURE — 82728 ASSAY OF FERRITIN: CPT | Performed by: PHYSICIAN ASSISTANT

## 2019-04-15 PROCEDURE — 85025 COMPLETE CBC W/AUTO DIFF WBC: CPT | Performed by: PHYSICIAN ASSISTANT

## 2019-04-15 PROCEDURE — 83540 ASSAY OF IRON: CPT | Performed by: PHYSICIAN ASSISTANT

## 2019-04-15 PROCEDURE — 83550 IRON BINDING TEST: CPT | Performed by: PHYSICIAN ASSISTANT

## 2019-04-15 PROCEDURE — 36415 COLL VENOUS BLD VENIPUNCTURE: CPT | Performed by: PHYSICIAN ASSISTANT

## 2019-04-15 NOTE — TELEPHONE ENCOUNTER
Patient is calling to ask pcp to enter orders for labs, stated pcp would know what orders she needs. Please call patient when this has been done so she can schedule this appointment     Thank you

## 2019-04-16 DIAGNOSIS — D50.9 IRON DEFICIENCY ANEMIA, UNSPECIFIED IRON DEFICIENCY ANEMIA TYPE: Primary | ICD-10-CM

## 2019-04-16 LAB
FERRITIN SERPL-MCNC: 95 NG/ML (ref 12–150)
IRON SATN MFR SERPL: 29 % (ref 15–46)
IRON SERPL-MCNC: 80 UG/DL (ref 35–180)
TIBC SERPL-MCNC: 276 UG/DL (ref 240–430)

## 2019-05-03 ENCOUNTER — OFFICE VISIT (OUTPATIENT)
Dept: FAMILY MEDICINE | Facility: CLINIC | Age: 44
End: 2019-05-03
Payer: COMMERCIAL

## 2019-05-03 ENCOUNTER — ANCILLARY PROCEDURE (OUTPATIENT)
Dept: GENERAL RADIOLOGY | Facility: CLINIC | Age: 44
End: 2019-05-03
Attending: PHYSICIAN ASSISTANT
Payer: COMMERCIAL

## 2019-05-03 VITALS
RESPIRATION RATE: 19 BRPM | HEART RATE: 98 BPM | OXYGEN SATURATION: 97 % | DIASTOLIC BLOOD PRESSURE: 83 MMHG | SYSTOLIC BLOOD PRESSURE: 129 MMHG | TEMPERATURE: 97.7 F

## 2019-05-03 DIAGNOSIS — R05.9 COUGH: ICD-10-CM

## 2019-05-03 DIAGNOSIS — R05.9 COUGH: Primary | ICD-10-CM

## 2019-05-03 DIAGNOSIS — J20.9 ACUTE BRONCHITIS, UNSPECIFIED ORGANISM: ICD-10-CM

## 2019-05-03 PROCEDURE — 99214 OFFICE O/P EST MOD 30 MIN: CPT | Performed by: PHYSICIAN ASSISTANT

## 2019-05-03 PROCEDURE — 71046 X-RAY EXAM CHEST 2 VIEWS: CPT | Performed by: INTERNAL MEDICINE

## 2019-05-03 RX ORDER — AZITHROMYCIN 250 MG/1
TABLET, FILM COATED ORAL
Qty: 6 TABLET | Refills: 0 | Status: SHIPPED | OUTPATIENT
Start: 2019-05-03 | End: 2019-10-10

## 2019-05-03 RX ORDER — ALBUTEROL SULFATE 90 UG/1
2 AEROSOL, METERED RESPIRATORY (INHALATION) EVERY 6 HOURS
Qty: 8.5 G | Refills: 1 | Status: SHIPPED | OUTPATIENT
Start: 2019-05-03 | End: 2019-10-10

## 2019-05-03 RX ORDER — PREDNISONE 20 MG/1
20 TABLET ORAL 2 TIMES DAILY
Qty: 10 TABLET | Refills: 0 | Status: SHIPPED | OUTPATIENT
Start: 2019-05-03 | End: 2019-07-22

## 2019-05-03 RX ORDER — BENZONATATE 200 MG/1
200 CAPSULE ORAL 3 TIMES DAILY PRN
Qty: 30 CAPSULE | Refills: 0 | Status: SHIPPED | OUTPATIENT
Start: 2019-05-03 | End: 2019-10-10

## 2019-05-03 RX ORDER — ALBUTEROL SULFATE 90 UG/1
2 AEROSOL, METERED RESPIRATORY (INHALATION) ONCE
Status: DISCONTINUED | OUTPATIENT
Start: 2019-05-03 | End: 2019-05-03

## 2019-05-03 NOTE — PROGRESS NOTES
SUBJECTIVE:   Jeanne Wasserman is a 44 year old female who presents to clinic today for the following   health issues:      RESPIRATORY SYMPTOMS      Duration: 1 week    Description  nasal congestion, cough, fever, chills and fatigue    Severity: mild    Accompanying signs and symptoms: None    History (predisposing factors):  none    Precipitating or alleviating factors: None    Therapies tried and outcome:  mucinex with no relief      No calf pain or swelling. Some wheezing. Hoarseness.   Nocturnal cough.    Additional history: as documented    Reviewed  and updated as needed this visit by clinical staff  Tobacco  Allergies  Meds         Reviewed and updated as needed this visit by Provider         BP Readings from Last 3 Encounters:   05/03/19 129/83   02/14/19 118/62   02/06/19 123/84    Wt Readings from Last 3 Encounters:   06/16/16 89.4 kg (197 lb)   06/06/16 89.4 kg (197 lb)                    All other systems negative except as outline above.  OBJECTIVE:  ENT exam reveals - bilateral TM fluid noted, neck without nodes, throat normal without erythema or exudate, sinuses nontender, post nasal drip noted, nasal mucosa congested and nasal mucosa pale and congested.  CHEST:chest clear to IPPA, no tachypnea, retractions or cyanosis and S1, S2 normal, no murmur, no gallop, rate regular.  Eye exam - right eye normal lid, conjunctiva, cornea, pupil and fundus, left eye normal lid, conjunctiva, cornea, pupil and fundus.  No calf pain or swelling.       Jeanne was seen today for cough.    Diagnoses and all orders for this visit:    Cough  -     XR Chest 2 Views; Future  -     benzonatate (TESSALON) 200 MG capsule; Take 1 capsule (200 mg) by mouth 3 times daily as needed for cough    Acute bronchitis, unspecified organism  -     predniSONE (DELTASONE) 20 MG tablet; Take 20 mg by mouth 2 times daily for 5 days.  -     albuterol (PROAIR HFA/PROVENTIL HFA/VENTOLIN HFA) 108 (90 Base) MCG/ACT inhaler 2 puff  -      azithromycin (ZITHROMAX) 250 MG tablet; Two tablets first day, then one tablet daily for four days.      Advised supportive and symptomatic treatment.  Follow up with Provider - if condition persists or worsens.

## 2019-05-30 ENCOUNTER — OFFICE VISIT (OUTPATIENT)
Dept: ORTHOPEDICS | Facility: CLINIC | Age: 44
End: 2019-05-30
Payer: COMMERCIAL

## 2019-05-30 VITALS — BODY MASS INDEX: 30.85 KG/M2 | HEIGHT: 67 IN | DIASTOLIC BLOOD PRESSURE: 64 MMHG | SYSTOLIC BLOOD PRESSURE: 110 MMHG

## 2019-05-30 DIAGNOSIS — M25.562 ACUTE PAIN OF LEFT KNEE: Primary | ICD-10-CM

## 2019-05-30 PROCEDURE — 99204 OFFICE O/P NEW MOD 45 MIN: CPT | Performed by: FAMILY MEDICINE

## 2019-05-30 RX ORDER — NAPROXEN 500 MG/1
500 TABLET ORAL 2 TIMES DAILY WITH MEALS
Qty: 30 TABLET | Refills: 0 | Status: SHIPPED | OUTPATIENT
Start: 2019-05-30 | End: 2019-10-10
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
## 2019-06-03 ENCOUNTER — TELEPHONE (OUTPATIENT)
Dept: ORTHOPEDICS | Facility: CLINIC | Age: 44
End: 2019-06-03

## 2019-06-03 ENCOUNTER — ANCILLARY PROCEDURE (OUTPATIENT)
Dept: MRI IMAGING | Facility: CLINIC | Age: 44
End: 2019-06-03
Attending: FAMILY MEDICINE
Payer: COMMERCIAL

## 2019-06-03 DIAGNOSIS — M25.562 ACUTE PAIN OF LEFT KNEE: Primary | ICD-10-CM

## 2019-06-03 DIAGNOSIS — M25.562 ACUTE PAIN OF LEFT KNEE: ICD-10-CM

## 2019-06-03 PROCEDURE — 73721 MRI JNT OF LWR EXTRE W/O DYE: CPT | Mod: TC

## 2019-06-03 NOTE — TELEPHONE ENCOUNTER
MR KNEE LEFT WITHOUT CONTRAST Elisha 3, 2019 9:41 AM     HISTORY: Mechanical knee symptoms: locking, catching, snapping,  crepitus, hyperextension, instability symptoms, possible Lachman's.  Acute pain of left knee.     TECHNIQUE: Multiplanar, multisequence without contrast.     FINDINGS:   Medial Meniscus: No tear, displaced fragment, or extrusion.         Lateral Meniscus: No tear, displaced fragment, or extrusion.         Anterior Cruciate Ligament: Unremarkable. No sprain or tear  identified.      Posterior Cruciate Ligament: Unremarkable. No sprain or tear  identified.      Medial Collateral Ligament: No sprain or tear identified.     Lateral Collateral Ligament Complex, Popliteus Tendon: The iliotibial  band, fibular collateral ligament, biceps femoris tendon, and  popliteus tendon are intact.     Osseous and Cartilaginous Structures: Very mild reactive subchondral  edema versus contusion of the medial tibial plateau. Three-compartment  osteophytosis. At least grade 1 chondromalacia of the lateral  compartment. There is focal grade 2 chondromalacia at the lateral  aspect of the medial femoral condyle. There is patellofemoral  chondromalacia, including grade 3-4 involvement.     Extensor Mechanism: The quadriceps and patellar tendons are intact.  The medial and lateral patellar retinacula appear unremarkable.     Joint Space: Mild joint effusion. No definite loose bodies  appreciated.     Additional Findings: No Baker's cyst. No semimembranosus-tibial  collateral ligament or pes anserine bursitis. Nonspecific anterior  subcutaneous edema.                                                                         IMPRESSION:  1. 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  2. Mild effusion.

## 2019-06-04 NOTE — TELEPHONE ENCOUNTER
Patient called and is unable to get in with Dr. Cam until next week, is requesting a call to discuss MRI  results ASAP (today-pt's request) to discuss results. Please call to advise.

## 2019-06-04 NOTE — TELEPHONE ENCOUNTER
Pt contacted about negative MRI except for chondromalacia and mild effusion with no ACL or mensical injury.  Pt feeling better, plan to cont to monitor sx and f/u PRN.  Pt understands and agrees with plan.    Tye Cam

## 2019-06-06 ENCOUNTER — TELEPHONE (OUTPATIENT)
Dept: ORTHOPEDICS | Facility: CLINIC | Age: 44
End: 2019-06-06

## 2019-06-06 DIAGNOSIS — M25.562 ACUTE PAIN OF LEFT KNEE: Primary | ICD-10-CM

## 2019-06-06 NOTE — TELEPHONE ENCOUNTER
LVM that she would like to talk to Dr. Cam about her knee pain. She is still in a lot of pain and would like to discuss additional treatment options.     Phone: [phone]    Coleen Siddiqui ATC

## 2019-06-06 NOTE — TELEPHONE ENCOUNTER
Patient calling again regarding below message. States her pain level is high and considering ED visit. Wants a returned call ASAP.

## 2019-06-06 NOTE — TELEPHONE ENCOUNTER
Pt returned call about left knee pain, MRI results showing chondromalacia with mild knee effusion.  Pain still persisting, naproxen helped but made her sleepy, trying regular Aleve during the day.  Pt was informed that MRI did not show any concerning pathology for infection.  Given significant knee pain, will plan to have her tx with Tylenol and Naproxen and f/u in clinic on 6/11/19.  Pt understands and agrees with plan.    Tye Cam

## 2019-06-06 NOTE — TELEPHONE ENCOUNTER
Dr. Cam called and discussed treatment plan with patient.  She will plan on following up for her previously scheduled appointment next Tuesday for an injection.    Amna Mederos, ATC

## 2019-06-11 ENCOUNTER — OFFICE VISIT (OUTPATIENT)
Dept: ORTHOPEDICS | Facility: CLINIC | Age: 44
End: 2019-06-11
Payer: COMMERCIAL

## 2019-06-11 DIAGNOSIS — M25.562 ACUTE PAIN OF LEFT KNEE: ICD-10-CM

## 2019-06-11 PROCEDURE — 20611 DRAIN/INJ JOINT/BURSA W/US: CPT | Mod: LT | Performed by: FAMILY MEDICINE

## 2019-06-11 RX ORDER — ROPIVACAINE HYDROCHLORIDE 5 MG/ML
3 INJECTION, SOLUTION EPIDURAL; INFILTRATION; PERINEURAL
Status: DISCONTINUED | OUTPATIENT
Start: 2019-06-11 | End: 2023-12-14

## 2019-06-11 RX ORDER — BETAMETHASONE SODIUM PHOSPHATE AND BETAMETHASONE ACETATE 3; 3 MG/ML; MG/ML
6 INJECTION, SUSPENSION INTRA-ARTICULAR; INTRALESIONAL; INTRAMUSCULAR; SOFT TISSUE
Status: DISCONTINUED | OUTPATIENT
Start: 2019-06-11 | End: 2023-12-14

## 2019-06-11 RX ORDER — CYCLOBENZAPRINE HCL 10 MG
10 TABLET ORAL
Qty: 30 TABLET | Refills: 0 | Status: SHIPPED | OUTPATIENT
Start: 2019-06-11 | End: 2019-10-10

## 2019-06-11 RX ADMIN — ROPIVACAINE HYDROCHLORIDE 3 ML: 5 INJECTION, SOLUTION EPIDURAL; INFILTRATION; PERINEURAL at 16:30

## 2019-06-11 RX ADMIN — BETAMETHASONE SODIUM PHOSPHATE AND BETAMETHASONE ACETATE 6 MG: 3; 3 INJECTION, SUSPENSION INTRA-ARTICULAR; INTRALESIONAL; INTRAMUSCULAR; SOFT TISSUE at 16:30

## 2019-06-11 NOTE — PATIENT INSTRUCTIONS
1) Can do bicycle at a difficult level to strengthen knees  2) Home exercises given  3) Pool therapy for exercise  4) Flexeril at night for pain   5) Follow-up if pain not improving    It was great seeing you again today!    Tye Cam        [facility] [place]      You may shower, however avoid swimming, tub baths or hot tubs for 24 hours following your procedure    You may have a mild to moderate increase in pain for several days following the injection.    It may take up to 14 days for the steroid medication to start working although you may feel the effect as early as a few days after the procedure.    You may use ice packs for 10-15 minutes, 3 to 4 times a day at the injection site for comfort    You may use anti-inflammatory medications (such as Ibuprofen or Aleve or Advil) or Tylenol for pain control if necessary    If you were fasting, you may resume your normal diet and medications after the procedure    If you have diabetes, check your blood sugar more frequently than usual as your blood sugar may be higher than normal for 10-14 days following a steroid injection. Contact your doctor who manages your diabetes if your blood sugar is higher than usual      If you experience any of the following, call [facility] @ [phone] or [phone]  -Fever over 100 degree F  -Swelling, bleeding, redness, drainage, warmth at the injection site  - New or worsening pain

## 2019-06-11 NOTE — PROGRESS NOTES
Large Joint Injection/Arthocentesis: L knee joint  Date/Time: 6/11/2019 4:30 PM  Performed by: Tye Cam MD  Authorized by: Tye Cam MD     Indications:  Pain  Needle Size:  25 G  Guidance: ultrasound    Approach:  Anterolateral  Location:  Knee      Medications:  6 mg betamethasone acet & sod phos 6 (3-3) MG/ML; 3 mL ropivacaine 5 MG/ML  Medications comment:  Actual amount of ropivacaine used 4 mL  Outcome:  Tolerated well, no immediate complications  Procedure discussed: discussed risks, benefits, and alternatives    Consent Given by:  Patient  Timeout: timeout called immediately prior to procedure    Prep: patient was prepped and draped in usual sterile fashion     Patient reported significant improvement of pain after injection.  Aftercare instructions given to patient.  Plan to follow-up as previously discussed with referring provider.     Tye Cam MD [facility]

## 2019-06-11 NOTE — LETTER
6/11/2019         RE: Jeanne Wasserman  [address]  [place] [address]        Dear Colleague,    Thank you for referring your patient, Jeanne Wasserman, to the [facility]. Please see a copy of my visit note below.    Large Joint Injection/Arthocentesis: L knee joint  Date/Time: 6/11/2019 4:30 PM  Performed by: Tye Cam MD  Authorized by: Tye Cam MD     Indications:  Pain  Needle Size:  25 G  Guidance: ultrasound    Approach:  Anterolateral  Location:  Knee      Medications:  6 mg betamethasone acet & sod phos 6 (3-3) MG/ML; 3 mL ropivacaine 5 MG/ML  Medications comment:  Actual amount of ropivacaine used 4 mL  Outcome:  Tolerated well, no immediate complications  Procedure discussed: discussed risks, benefits, and alternatives    Consent Given by:  Patient  Timeout: timeout called immediately prior to procedure    Prep: patient was prepped and draped in usual sterile fashion     Patient reported significant improvement of pain after injection.  Aftercare instructions given to patient.  Plan to follow-up as previously discussed with referring provider.     Tye Cam MD [facility]            Again, thank you for allowing me to participate in the care of your patient.        Sincerely,        Tye Cam MD

## 2019-07-01 ENCOUNTER — MYC MEDICAL ADVICE (OUTPATIENT)
Dept: ORTHOPEDICS | Facility: CLINIC | Age: 44
End: 2019-07-01

## 2019-07-01 NOTE — TELEPHONE ENCOUNTER
Pt still in a lot of pain following steroid injection.  Pain described as constant throbbing pain despite conservative management.  Given this will see pt tomorrow at 1300.      Tye Cam

## 2019-07-02 ENCOUNTER — OFFICE VISIT (OUTPATIENT)
Dept: ORTHOPEDICS | Facility: CLINIC | Age: 44
End: 2019-07-02
Payer: COMMERCIAL

## 2019-07-02 VITALS — BODY MASS INDEX: 30.85 KG/M2 | HEIGHT: 67 IN | DIASTOLIC BLOOD PRESSURE: 60 MMHG | SYSTOLIC BLOOD PRESSURE: 112 MMHG

## 2019-07-02 DIAGNOSIS — M54.16 LUMBAR RADICULOPATHY, ACUTE: Primary | ICD-10-CM

## 2019-07-02 PROCEDURE — 99214 OFFICE O/P EST MOD 30 MIN: CPT | Performed by: FAMILY MEDICINE

## 2019-07-02 RX ORDER — TIZANIDINE HYDROCHLORIDE 4 MG/1
4 CAPSULE, GELATIN COATED ORAL
Qty: 30 CAPSULE | Refills: 1 | Status: SHIPPED | OUTPATIENT
Start: 2019-07-02 | End: 2019-10-10

## 2019-07-02 RX ORDER — PREDNISONE 20 MG/1
40 TABLET ORAL DAILY
Qty: 10 TABLET | Refills: 0 | Status: SHIPPED | OUTPATIENT
Start: 2019-07-02 | End: 2019-07-22

## 2019-07-02 RX ORDER — NABUMETONE 500 MG/1
500 TABLET, FILM COATED ORAL 2 TIMES DAILY
Qty: 30 TABLET | Refills: 1 | Status: SHIPPED | OUTPATIENT
Start: 2019-07-02 | End: 2019-10-10
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
## 2019-07-02 NOTE — PATIENT INSTRUCTIONS
Diagnosis: Left Lumbar Radiculopathy  Image Findings: None  Treatment: Prednisone for 5 days, Tizanidine at night for muscle spasms, Relafen after prednisone course as needed for pain, will follow-up with primary care provider for initiation of Topamax  Medications: as above  Follow-up: 3-4 weeks if not better, sooner if worsePatient Education     Understanding Lumbar Radiculopathy    Lumbar radiculopathy is irritation or inflammation of a nerve root in the low back. It causes symptoms that spread out from the back down one or both legs. To understand this condition, it helps to understand the parts of the spine:    Vertebrae. These are bones that stack to form the spine. The lumbar spine contains the 5 bottom vertebrae.    Disks. These are soft pads of tissue between the vertebrae. They act as shock absorbers for the spine.    Spinal canal. This is a tunnel formed within the stacked vertebrae. In the lumbar spine, nerves run through this canal.    Nerves. These branch off and leave the spinal canal, traveling out to parts of the body. As they leave the spinal canal, nerves pass through openings between the vertebrae. The nerve root is the part of the nerve that is closest to the spinal canal.    Sciatic nerve. This is a large nerve formed from several nerve roots in the low back. This nerve extends down the back of the leg to the foot.  With lumbar radiculopathy, nerve roots in the low back become irritated. This leads to pain and symptoms. The sciatic nerve is commonly involved, so the condition is often called sciatica.  What causes lumbar radiculopathy?  Aging, injury, poor posture, extra body weight, and other issues can lead to problems in the low back. These problems may then irritate nerve roots. They include:    Damage to a disk in the lumbar spine. The damaged disk may then press on nearby nerve roots.    Degeneration from wear and tear, and aging. This can lead to narrowing (stenosis) of the openings  between the vertebrae. The narrowed openings press on nerve roots as they leave the spinal canal.    Unstable spine. This is when a vertebra slips forward. It can then press on a nerve root.  Other, less common things can put pressure on nerves in the low back. These include diabetes, infection, or a tumor.  Symptoms of lumbar radiculopathy  These include:    Pain in the low back    Pain, numbness, tingling, or weakness that travels into the buttocks, hip, groin, or leg    Muscle spasms  Treatment for lumbar radiculopathy  In most cases, your healthcare provider will first try treatments that help relieve symptoms. These may include:    Prescription and over-the-counter pain medicines. These help relieve pain, swelling, and irritation.    Limits on positions and activities that increase pain. But lying in bed or avoiding all movement is only recommended for a short period of time.    Physical therapy, including exercises and stretches. This helps decrease pain and increase movement and function.    Steroid shots into the lower back. This may help relieve symptoms for a time.    Weight-loss program. If you are overweight, losing extra pounds may help relieve symptoms.  In some cases, you may need surgery to fix the underlying problem. This depends on the cause, the symptoms, and how long the pain has lasted.  Possible complications  Over time, an irritated and inflamed nerve may become damaged. This may lead to long-lasting (permanent) numbness or weakness in your legs and feet. If symptoms change suddenly or get worse, be sure to let your healthcare provider know.  When to call your healthcare provider  Call your healthcare provider right away if you have any of these:    New pain or pain that gets worse    New or increasing weakness, tingling, or numbness in your leg or foot    Problems controlling your bladder or bowel   Date Last Reviewed: 3/10/2016    0587-8251 [employer] [address],  [place] [address]. All rights reserved. This information is not intended as a substitute for professional medical care. Always follow your healthcare professional's instructions.

## 2019-07-11 ENCOUNTER — MYC MEDICAL ADVICE (OUTPATIENT)
Dept: ORTHOPEDICS | Facility: CLINIC | Age: 44
End: 2019-07-11

## 2019-07-11 DIAGNOSIS — M54.16 LUMBAR RADICULOPATHY, ACUTE: Primary | ICD-10-CM

## 2019-07-11 NOTE — TELEPHONE ENCOUNTER
Spoke to Dr. Cam and he was ok with putting the referral in for a lumbar JOVAN.    Coleen Siddiqui ATC

## 2019-07-12 ENCOUNTER — TELEPHONE (OUTPATIENT)
Dept: PALLIATIVE MEDICINE | Facility: CLINIC | Age: 44
End: 2019-07-12

## 2019-07-12 NOTE — TELEPHONE ENCOUNTER
Pre-screening Questions for Radiology Injections:    Injection to be done at which interventional clinic site? [facility]    Instruct patient to arrive as directed prior to the scheduled appointment time:    [place] minutes before      [place]: 30 minutes before; if IV needed 1 hour before     Procedure ordered by Dr. Anton     Procedure ordered? LESI         Transforaminal Cervical JOVAN - Dr. Lee Ann Bruger ONLY    What insurance would patient like us to bill for this procedure? Pref. One       Worker's comp or MVA (motor vehicle accident) -Any injection DO NOT SCHEDULE and route to Lien Darwin.      [employer] insurance - For SI joint injections, DO NOT SCHEDULE and route Lien Darwin.       [employer] - Any injection besides hip/shoulder/knee joint DO NOT SCHEDULE and route to Lien Darwin. She will obtain PA and call pt back to schedule procedure or notify pt of denial.       [employer]-Route to Lien for review      IF SCHEDULING IN [place] AND NEEDS A PA, IT IS OKAY TO SCHEDULE. [place] HANDLES THEIR OWN PA'S AFTER THE PATIENT IS SCHEDULED. PLEASE SCHEDULE AT LEAST 1 WEEK OUT SO A PA CAN BE OBTAINED.      Any chance of pregnancy? NO   If YES, do NOT schedule and route to RN pool    Is an  needed? No     Patient has a drive home? (mandatory) YES: informed     Is patient taking any blood thinners (plavix, coumadin, jantoven, warfarin, heparin, pradaxa or dabigatran )? No   If hold needed, do NOT schedule, route to RN pool     Is patient taking any aspirin products (includes Excedrin and Fiorinal)? No     If more than 325mg/day do NOT schedule; route to RN pool     For CERVICAL procedures, hold all aspirin products for 6 days.     Tell pt that if aspirin product is not held for 6 days, the procedure WILL BE cancelled.      Does the patient have a bleeding or clotting disorder? No     If YES, okay to schedule AND route to RN nurse pool    For any patients with platelet  count <100, must be forwarded to provider    Is patient diabetic?  No  If YES, have them bring their glucometer.    Does patient have an active infection or treated for one within the past week? No     Is patient currently taking any antibiotics?  No     For patients on chronic, preventative, or prophylactic antibiotics, procedures may be scheduled.     For patients on antibiotics for active or recent infection:antibiotic course must have been completed for 4 days    Is patient currently taking any steroid medications? (i.e. Prednisone, Medrol)  No     For patients on steroid medications, course must have been completed for 4 days    Reviewed with patient:  If you are started on any steroids or antibiotics between now and your appointment, you must contact us because the procedure may need to be cancelled.  Yes    Is patient actively being treated for cancer or immunocompromised? No  If YES, do NOT schedule and route to RN pool     Are you able to get on and off an exam table with minimal or no assistance? Yes  If NO, do NOT schedule and route to RN pool    Are you able to roll over and lay on your stomach with minimal or no assistance? Yes  If NO, do NOT schedule and route to RN pool     Any allergies to contrast dye, iodine, shellfish, or numbing and steroid medications? No  If YES, route to RN pool AND add allergy information to appointment notes    Allergies: Morphine      Has the patient had a flu shot or any other vaccinations within 7 days before or after the procedure.  No     Does patient have an MRI/CT?  YES: 2019  (SI joint, hip injections, lumbar sympathetic blocks, and stellate ganglion blocks do not require an MRI)    Was the MRI done w/in the last 3 years?  Yes    Was MRI done at [facility]? Yes      If not, where was it done? N/A       If MRI was not done at [facility], [facility] do NOT schedule and route to nursing.  If pt has an imaging disc, the injection may be scheduled but pt has to  bring disc to appt. If they show up w/out disc the injection cannot be done    Reminders (please tell patient if applicable):       Instructed pt to arrive 30 minutes early for IV start if this is for a cervical procedure, ALL sympathetic (stellate ganglion, hypogastric, or lumbar sympathetic block) and all sedation procedures (RFA, spinal cord stimulation trials).  Not Applicable   -IVs are not routinely placed for Dr. Laboy cervical cases   -Dr. Duron: IVs for cervical ESIs and cervical TBDs (not CMBBs/facet inj)      If NPO for sedation, informed patient that it is okay to take medications with sips of water (except if they are to hold blood thinners).  Not Applicable   *DO take blood pressure medication if it is prescribed*      If this is for a cervical JOVAN, informed patient that aspirin needs to be held for 6 days.   Not Applicable      For all patients not having spinal cord stimulator (SCS) trials or radiofrequency ablations (RFAs), informed patient:    IV sedation is not provided for this procedure.  If you feel that an oral anti-anxiety medication is needed, you can discuss this further with your referring provider or primary care provider.  The Pain Clinic provider will discuss specifics of what the procedure includes at your appointment.  Most procedures last 10-20 minutes.  We use numbing medications to help with any discomfort during the procedure.  Not Applicable      Do not schedule procedures requiring IV placement in the first appointment of the day or first appointment after lunch. Do NOT schedule at 0745, 0815 or 1245.       For patients 85 or older we recommend having an adult stay w/ them for the remainder of the day.     Does the patient have any questions?  NO  Zhanna Dumont  [facility]

## 2019-07-22 ENCOUNTER — ANCILLARY PROCEDURE (OUTPATIENT)
Dept: MRI IMAGING | Facility: CLINIC | Age: 44
End: 2019-07-22
Attending: FAMILY MEDICINE
Payer: COMMERCIAL

## 2019-07-22 ENCOUNTER — RADIOLOGY INJECTION OFFICE VISIT (OUTPATIENT)
Dept: PALLIATIVE MEDICINE | Facility: CLINIC | Age: 44
End: 2019-07-22
Payer: COMMERCIAL

## 2019-07-22 VITALS — DIASTOLIC BLOOD PRESSURE: 84 MMHG | HEART RATE: 74 BPM | SYSTOLIC BLOOD PRESSURE: 129 MMHG

## 2019-07-22 DIAGNOSIS — M54.16 LUMBAR RADICULOPATHY: Primary | ICD-10-CM

## 2019-07-22 DIAGNOSIS — M54.16 LUMBAR RADICULOPATHY: ICD-10-CM

## 2019-07-22 PROCEDURE — 99207 ZZC NON-BILLABLE SERV PER CHARTING: CPT | Performed by: PSYCHIATRY & NEUROLOGY

## 2019-07-22 PROCEDURE — 72148 MRI LUMBAR SPINE W/O DYE: CPT | Mod: TC

## 2019-07-22 RX ORDER — OXYCODONE AND ACETAMINOPHEN 5; 325 MG/1; MG/1
1 TABLET ORAL EVERY 6 HOURS PRN
Qty: 10 TABLET | Refills: 0 | Status: CANCELLED | OUTPATIENT
Start: 2019-07-22

## 2019-07-22 RX ORDER — OXYCODONE AND ACETAMINOPHEN 5; 325 MG/1; MG/1
1 TABLET ORAL EVERY 6 HOURS PRN
Qty: 10 TABLET | Refills: 0 | Status: SHIPPED | OUTPATIENT
Start: 2019-07-22 | End: 2019-07-24

## 2019-07-22 RX ORDER — HYDROCODONE BITARTRATE AND ACETAMINOPHEN 5; 325 MG/1; MG/1
1 TABLET ORAL EVERY 6 HOURS PRN
Qty: 10 TABLET | Refills: 0 | Status: CANCELLED | OUTPATIENT
Start: 2019-07-22

## 2019-07-22 ASSESSMENT — PAIN SCALES - GENERAL: PAINLEVEL: EXTREME PAIN (8)

## 2019-07-22 NOTE — PATIENT INSTRUCTIONS
You are rescheduled for Wednesday at 10:45     ----------------------------------------------------------------  Clinic Number:  [phone]     Call with any questions about your care and for scheduling assistance.     Calls are returned Monday through Friday between 8 AM and 4:30 PM. We usually get back to you within 2 business days depending on the issue/request.    If we are prescribing your medications:    For opioid medication refills, call the clinic or send a [employer] message 7 days in advance.  Please include:    Name of requested medication    Name of the pharmacy.    For non-opioid medications, call your pharmacy directly to request a refill. Please allow 3-4 days to be processed.     Per [place] State Law:    All controlled substance prescriptions must be filled within 30 days of being written.      For those controlled substances allowing refills, pickup must occur within 30 days of last fill.      We believe regular attendance is key to your success in our program!      Any time you are unable to keep your appointment we ask that you call us at least 24 hours in advance to cancel.This will allow us to offer the appointment time to another patient.     Multiple missed appointments may lead to dismissal from the clinic.

## 2019-07-22 NOTE — NURSING NOTE
Pre-procedure Intake    Have you been fasting? NA    If yes, for how long? NA    Are you taking a prescribed blood thinner such as coumadin, Plavix, Xarelto?    No    If yes, when did you take your last dose? NA    Do you take aspirin?  No    If cervical procedure, have you held aspirin for 6 days?   NA    Do you have any allergies to contrast dye, iodine, steroid and/or numbing medications?  NO    Are you currently taking antibiotics or have an active infection?  NO    Have you had a fever/elevated temperature within the past week? NO    Are you currently taking oral steroids? NO    Do you have a ? Yes       Are you pregnant or breastfeeding?  NO    Are the vital signs normal?  Yes      Shahrzad Garcia CMA ([facility])'

## 2019-07-22 NOTE — PROGRESS NOTES
[facility] Scheduling:    [facility] [place]: [phone]    [facility]: [phone]    [facility] (including [place]): [phone]

## 2019-07-22 NOTE — PROGRESS NOTES
Patient did not have MRI scan, unable to do epidural    MRI of the lumbar spine coordinated for tonight.  Will add to my schedule on Wed at 10:45am.    Millicent Lerner MD  [facility]

## 2019-07-22 NOTE — TELEPHONE ENCOUNTER
Worked with Dr. Cam and Lise for patient.  MRI set up for this evening.  Double booked for Wed.    Dr. Cam was willing to write for pain meds for 10 tabs.  Percocet signed by me as I am able to Eprescribe and Dr. Cam not at this location.    Signed Prescriptions:                        Disp   Refills    oxyCODONE-acetaminophen (PERCOCET) 5-325 M*10 tab*0        Sig: Take 1 tablet by mouth every 6 hours as needed for           severe pain  Authorizing Provider: QIAN SARGENT MD  [facility]

## 2019-07-22 NOTE — TELEPHONE ENCOUNTER
Pt having persisting pain despite conservative measures including PO steroids, rest, HEP and NSAIDs.  Given this will have pt get lumbar MRI and refer to pain for possible JOVAN.  Unfortunately the JOVAN was ordered without and MRI and pt will have to reschedule JOVAN until after imaging.      Tye Cam

## 2019-07-24 ENCOUNTER — RADIOLOGY INJECTION OFFICE VISIT (OUTPATIENT)
Dept: PALLIATIVE MEDICINE | Facility: CLINIC | Age: 44
End: 2019-07-24
Payer: COMMERCIAL

## 2019-07-24 ENCOUNTER — ANCILLARY PROCEDURE (OUTPATIENT)
Dept: RADIOLOGY | Facility: CLINIC | Age: 44
End: 2019-07-24
Attending: PSYCHIATRY & NEUROLOGY
Payer: COMMERCIAL

## 2019-07-24 VITALS — HEART RATE: 80 BPM | SYSTOLIC BLOOD PRESSURE: 119 MMHG | DIASTOLIC BLOOD PRESSURE: 87 MMHG

## 2019-07-24 DIAGNOSIS — M54.16 LUMBAR RADICULOPATHY: Primary | ICD-10-CM

## 2019-07-24 PROCEDURE — 64483 NJX AA&/STRD TFRM EPI L/S 1: CPT | Mod: LT | Performed by: PSYCHIATRY & NEUROLOGY

## 2019-07-24 ASSESSMENT — PAIN SCALES - GENERAL: PAINLEVEL: MODERATE PAIN (5)

## 2019-07-24 NOTE — PATIENT INSTRUCTIONS
[facility]   Procedure Discharge Instructions    Today you saw:     Dr. Belem Lerner    You had an:  Epidural steroid injection -lumbar  Medications used:  Lidocaine   Bupivacaine   Dexamethasone  Omnipaque            Be cautious when walking. Numbness and/or weakness in the lower extremities may occur for up to 6-8 hours after the procedure due to effect of the local anesthetic    Do not drive for 6 hours. The effect of the local anesthetic could slow your reflexes.     You may resume your regular activities after 24 hours    Avoid strenuous activity for the first 24 hours    You may shower, however avoid swimming, tub baths or hot tubs for 24 hours following your procedure    You may have a mild to moderate increase in pain for several days following the injection.    It may take up to 14 days for the steroid medication to start working although you may feel the effect as early as a few days after the procedure.       You may use ice packs for 10-15 minutes, 3 to 4 times a day at the injection site for comfort    Do not use heat to painful areas for 6 to 8 hours. This will give the local anesthetic time to wear off and prevent you from accidentally burning your skin.     Unless you have been directed to avoid the use of anti-inflammatory medications (NSAIDS), you may use medications such as ibuprofen, Aleve or Tylenol for pain control if needed.     If you were fasting, you may resume your normal diet and medications after the procedure    If you have diabetes, check your blood sugar more frequently than usual as your blood sugar may be higher than normal for 10-14 days following a steroid injection. Contact your doctor who manages your diabetes if your blood sugar is higher than usual    Possible side effects of steroids that you may experience include flushing, elevated blood pressure, increased appetite, mild headaches and restlessness.  All of these symptoms will get better with  time.    If you experience any of the following, call the Pain Clinic during work hours at [phone] or the Provider Line after hours at [phone]:  -Fever over 100 degree F  -Swelling, bleeding, redness, drainage, warmth at the injection site  -Progressive weakness or numbness in your legs or arms  -Loss of bowel or bladder function  -Unusual headache that is not relieved by Tylenol or other pain reliever  -Unusual new onset of pain that is not improving

## 2019-07-24 NOTE — PROGRESS NOTES
Pre procedure Diagnosis: lumbar radiculopathy   Post procedure Diagnosis: Same  Procedure performed: left L3 transforaminal epidural steroid injection   Anesthesia: none  Complications: none  Operators: Millicent Lerner MD     Indications:   Jeanne Wasserman is a 44 year old female was sent by Dr. Cam for epidural steroid injection.  They have a history of left leg pain, anterior and medial, severe, sharp.  Workup of knee unrevealing.  Pain is severe and hard to continue to manage.  Percocet not helpful, steroids werel..  Exam shows pain with range of motion, pain giveway with hip flexion and they have tried conservative treatment including medications.    MRI was done on 7/22/19 which showed   1. Moderate facet arthropathy at L4-L5 and L5-S1.  2. No evidence of disc herniation.  3. No significant spinal canal or foraminal stenosis.    Options/alternatives, benefits and risks were discussed with the patient including bleeding, infection, tissue trauma, numbness, weakness, paralysis, spinal cord injury, radiation exposure, headache and reaction to medications. Questions were answered to her satisfaction and she agrees to proceed. Voluntary informed consent was obtained and signed.     Vitals were reviewed: Yes  Allergies were reviewed:  Yes   Medications were reviewed:  Yes   Pre-procedure pain score: 5/10    Procedure:  After getting informed consent, patient was brought into the procedure suite and was placed in a prone position on the procedure table.   A Pause for the Cause was performed.  Patient was prepped and draped in sterile fashion.     After identifying the left L3 neuroforamen, the C-arm was rotated to a left lateral oblique angle.  A total of 4ml of Lidocaine 1% was used to anesthetize the skin and the needle track at a skin entry site coaxial with the fluoroscopy beam, and overriding the superior aspect of the neuroforamen.  A 22 gauge 5 inch spinal needle was advanced under intermittent fluoroscopy  until it entered the foramen superiorly.    The position was then inspected from anteroposterior and lateral views, and the needle adjusted appropriately.  A total of 1.5ml of Omnipaque-300 was injected, confirming appropriate position, with spread into the nerve root sheath and the epidural space, with no intravascular uptake. 8.5ml was wasted    1.5ml of 0.5% bupivacaine with 10mg of dexamethasone was injected.  The needle was flushed with lidocaine and removed.    During the procedure, there was a paresthesia. Occurred with pressure from medication, resolve spontaneously  Hemostasis was achieved, the area was cleaned, and bandaids were placed when appropriate.  The patient tolerated the procedure well, and was taken to the recovery room.    Images were saved to PACS.    Post-procedure pain score: 3/10  Follow-up includes:   -f/u phone call in one week  -f/u with referring provider    Millicent Lerner MD  [facility]

## 2019-07-25 ENCOUNTER — TELEPHONE (OUTPATIENT)
Dept: FAMILY MEDICINE | Facility: CLINIC | Age: 44
End: 2019-07-25

## 2019-07-25 DIAGNOSIS — Z98.890 HISTORY OF ENDOMETRIAL ABLATION: ICD-10-CM

## 2019-07-25 DIAGNOSIS — N94.6 DYSMENORRHEA: Primary | ICD-10-CM

## 2019-07-25 DIAGNOSIS — D25.1 INTRAMURAL AND SUBMUCOUS LEIOMYOMA OF UTERUS: ICD-10-CM

## 2019-07-25 DIAGNOSIS — D25.0 INTRAMURAL AND SUBMUCOUS LEIOMYOMA OF UTERUS: ICD-10-CM

## 2019-07-25 NOTE — TELEPHONE ENCOUNTER
Called and informed patient that Dr. Agbeh placed orders for ultrasound. Reviewed Dr. Agbeh's availability with patient and informed patient that she will need schedule follow up with Dr. Agbeh a few days after ultrasound is completed. Patient verbalized understanding. Patient given the phone number to schedule ultrasound and will call back to schedule follow up with Dr. Agbeh following ultrasound appointment.    Jeanne Vernon CMA  July 25, 2019  10:42 AM

## 2019-07-25 NOTE — TELEPHONE ENCOUNTER
Patient would like to speak with provider about poss hysterectomy and would like to have an ultrasound prior to making an appointment with provider     Please call to discuss and let patient know when order is placed so she can make this appointment with imaging     Ok to leave message on voice mail     Thank you

## 2019-07-25 NOTE — TELEPHONE ENCOUNTER
Routing to Dr. Agbeh to place orders if appropriate. Will then connect with patient to schedule surgical consultation.      Jeanne Vernon CMA  July 25, 2019  8:45 AM

## 2019-07-26 ENCOUNTER — ANCILLARY PROCEDURE (OUTPATIENT)
Dept: ULTRASOUND IMAGING | Facility: CLINIC | Age: 44
End: 2019-07-26
Attending: OBSTETRICS & GYNECOLOGY
Payer: COMMERCIAL

## 2019-07-26 DIAGNOSIS — D25.0 INTRAMURAL AND SUBMUCOUS LEIOMYOMA OF UTERUS: ICD-10-CM

## 2019-07-26 DIAGNOSIS — N94.6 DYSMENORRHEA: ICD-10-CM

## 2019-07-26 DIAGNOSIS — Z98.890 HISTORY OF ENDOMETRIAL ABLATION: ICD-10-CM

## 2019-07-26 DIAGNOSIS — D25.1 INTRAMURAL AND SUBMUCOUS LEIOMYOMA OF UTERUS: ICD-10-CM

## 2019-07-26 PROCEDURE — 76856 US EXAM PELVIC COMPLETE: CPT | Mod: 59

## 2019-07-26 PROCEDURE — 76830 TRANSVAGINAL US NON-OB: CPT

## 2019-07-29 ENCOUNTER — TELEPHONE (OUTPATIENT)
Dept: OBGYN | Facility: CLINIC | Age: 44
End: 2019-07-29

## 2019-07-29 ENCOUNTER — OFFICE VISIT (OUTPATIENT)
Dept: OBGYN | Facility: CLINIC | Age: 44
End: 2019-07-29
Payer: COMMERCIAL

## 2019-07-29 VITALS — DIASTOLIC BLOOD PRESSURE: 84 MMHG | HEART RATE: 81 BPM | OXYGEN SATURATION: 97 % | SYSTOLIC BLOOD PRESSURE: 133 MMHG

## 2019-07-29 DIAGNOSIS — N94.6 DYSMENORRHEA: Primary | ICD-10-CM

## 2019-07-29 DIAGNOSIS — D25.0 INTRAMURAL AND SUBMUCOUS LEIOMYOMA OF UTERUS: ICD-10-CM

## 2019-07-29 DIAGNOSIS — D25.1 INTRAMURAL AND SUBMUCOUS LEIOMYOMA OF UTERUS: ICD-10-CM

## 2019-07-29 DIAGNOSIS — R10.2 PELVIC PAIN IN FEMALE: ICD-10-CM

## 2019-07-29 PROCEDURE — 99214 OFFICE O/P EST MOD 30 MIN: CPT | Performed by: OBSTETRICS & GYNECOLOGY

## 2019-07-29 NOTE — TELEPHONE ENCOUNTER
Surgery Scheduled.    Date of Surgery 8/29/19Time of Surgery 1:15 pm  Procedure: TOTAL ABDOMINAL HYSTERECTOMY, BILATERAL SALPINGECTOMY  [place] [facility] [place] Dr. Agbeh  Type of Anesthesia Anticipated: general  Pre-op: 8/23/19 with [facility] at  [facility]  Pre-certification 7/29/19  Consent signed: yes  Hospital Stay yes    Surgery Pre-Certification    Medical Record Number: [id number]  Jeanne Wasserman  YOB: 1975   Phone: [phone] (home) [phone] (work)  Primary Provider: Anh Cortez    Reason for Admit:  UTERINE FIBOIDS, DYSMENORRHEA, PELVIC PAIN    Surgeon: C. Agbeh, MD  Surgical Procedure:TOTAL ABDOMINAL HYSTERECTOMY, BILATERAL SALPINGECTOMY  ICD-9 Coded: N10.2, D25.1, D25.0  Date of Surgery: 8/29/19  Consent signed? Yes    Date signed: 07/29/19    Hospital: [facility]  Inpatient- Length of stay:  3 days.    Requestor:  Lynnette Du     Location:  [facility]         Mailed surgery packet mailed to patient's home address.  Patient instructed NPO 12 hours prior to surgery, arrive 1 1/2  hours prior to surgery, must no have a .  Patient understood and agrees to the plan.     Lynnette Du  [facility]

## 2019-07-29 NOTE — TELEPHONE ENCOUNTER
Patient called again to speak with Lynnette. Her availability is limited so she's going to keep calling every few minutes until she can reach her. Please call patient.
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
## 2019-07-29 NOTE — TELEPHONE ENCOUNTER
Spoke with patient and she is set for date and time- patient pleased. Packet mailed and time blocked for provider    Lynnette Du  Women's Health

## 2019-07-29 NOTE — PATIENT INSTRUCTIONS
If you have any questions regarding your visit, Please contact your care team.  [facility]: [phone]  [facility] CLINIC HOURS TELEPHONE NUMBER   Cephas Agbeh, M.D. Lisa -       Gricel Villalobos         Monday-[place]ian    8:00a.m-4:45 p.m    Tuesday--[place]     8:00a.m-4:45 p.m.    Thursday-[place]ian    8:00a.m-4:45 p.m.    Friday-[place]    8:00a.m-4:45 p.m    [facility]   [address] [place] [address]   [phone]-Ask for [facility]   Fax [phone]   [phone]     [facility]   [address] [place] [address] [place] [address]   [phone]    [facility]  [address] [facility] [address]  [phone]  [phone]   Urgent Care locations:    [facility] Monday-Friday  5 pm - 9 pm  Saturday and Sunday   9 am - 5 pm   Monday-Friday   5 pm - 9 pm  Saturday and Sunday  9 am - 5 pm    [phone]   If you need a medication refill, please contact your pharmacy. Please allow 3 business days for your refill to be completed.  As always, Thank you for trusting us with your healthcare needs!

## 2019-07-29 NOTE — TELEPHONE ENCOUNTER
Associated Diagnoses     Dysmenorrhea  - Primary       Pelvic pain in female       Intramural and submucous leiomyoma of uterus       Order Questions     Question Answer Comment   Procedure name(s) - multi select TOTAL ABDOMINAL HYSTERECTOMY, BILATERAL SALPINGECTOMY.    Is this a multi surgeon case? No    Laterality Bilateral    Explant? No    Reason for procedure UTERINE FIBOIDS, DYSMENORRHEA, PELVIC PAIN    Urgency of Surgery Patient Choice/Next Available    Location of Case: [facility]    Surgeon Procedure Time (incision to closure) in minutes (per procedure as applicable) 120    Note:  Surgical Case Time Needed (in minutes)   Patient Class (for admit prior to surgery, specify number of days in comments): Inpatient    Length of Stay: 3 days    Anesthesia General    Vendor Needed? No

## 2019-07-30 NOTE — TELEPHONE ENCOUNTER
Spoke with from [facility]. Checked CPT code 06398.     No PA Required for the surgery itself. Coverage based on medical necessity. A PA will need to be submitted for the admission following the surgery but the hospital.

## 2019-08-02 DIAGNOSIS — M54.16 LUMBAR RADICULOPATHY: ICD-10-CM

## 2019-08-02 NOTE — TELEPHONE ENCOUNTER
"Requested Prescriptions   Pending Prescriptions Disp Refills     topiramate (TOPAMAX) 25 MG tablet [Pharmacy Med Name: TOPIRAMATE 25MG TABLETS]  Last Written Prescription Date:  07/19/*19  Last Fill Quantity: 60,  # refills: 0   Last office visit: 5/3/2019 with prescribing provider:  ZULEMA De Leon   Future Office Visit:   Next 5 appointments (look out 90 days)    Aug 23, 2019 11:00 AM CDT  Pre-Op physical with Anh Cortez PA-C  [facility] [address] [address] [address]  [phone]          60 tablet 0     Sig: TAKE 1 TABLET(25 MG) BY MOUTH TWICE DAILY FOR 2 WEEKS. MAY INCREASE TO 2 TABLET 2 TIMES DAILY AS NEEDED       Anti-Seizure Meds Protocol  Failed - 8/2/2019  4:09 PM        Failed - Review Authorizing provider's last note.      Refer to last progress notes: confirm request is for original authorizing provider (cannot be through other providers).          Failed - Normal ALT or AST on file in past 26 months     No lab results found.  No lab results found.          Passed - Recent (12 mo) or future (30 days) visit within the authorizing provider's specialty     Patient had office visit in the last 12 months or has a visit in the next 30 days with authorizing provider or within the authorizing provider's specialty.  See \"Patient Info\" tab in inbasket, or \"Choose Columns\" in Meds & Orders section of the refill encounter.              Passed - Normal CBC on file in past 26 months     Recent Labs   Lab Test 04/15/19  1609   WBC 7.5   RBC 4.62   HGB 13.8   HCT 42.3                    Passed - Normal platelet count on file in past 26 months     Recent Labs   Lab Test 04/15/19  1609                  Passed - Medication is active on med list        Passed - No active pregnancy on record        Passed - No positive pregnancy test in last 12 months          "

## 2019-08-05 RX ORDER — TOPIRAMATE 25 MG/1
TABLET, FILM COATED ORAL
Qty: 60 TABLET | Refills: 0 | OUTPATIENT
Start: 2019-08-05

## 2019-08-09 ENCOUNTER — MYC MEDICAL ADVICE (OUTPATIENT)
Dept: ORTHOPEDICS | Facility: CLINIC | Age: 44
End: 2019-08-09

## 2019-08-09 NOTE — LETTER
[facility]  [address]  [place]  [address]  [phone]          2019    Jeanne Wasserman  [address] [address]  [phone] (home) [phone] (work)    :     1975      To Whom it May Concern:    This patient has significant pain limiting her ability to function on any business travel particularly the one scheduled for Aug 13-.  Please excuse her from any trips over the next couple of weeks.  I am planning to see her on follow-up.      Please contact me for questions or concerns.    Sincerely,    Tye Cam

## 2019-08-12 NOTE — TELEPHONE ENCOUNTER
Pt was contacted in regards for her leg pain noting sx in the hip radiating to the knee.  Pt does have fibroids that could be contributing to her pain as well with plan for hysterectomy at the end of the month.  Uncertain whether low back pain vs knee pain vs possible hip causing sx.  Plan to reevaluate tomorrow.  Letter excusing her from business trip written.  Pt understands and agrees with plan.    Tye Cam

## 2019-08-13 ENCOUNTER — OFFICE VISIT (OUTPATIENT)
Dept: ORTHOPEDICS | Facility: CLINIC | Age: 44
End: 2019-08-13
Payer: COMMERCIAL

## 2019-08-13 VITALS — HEIGHT: 67 IN | BODY MASS INDEX: 30.85 KG/M2 | DIASTOLIC BLOOD PRESSURE: 78 MMHG | SYSTOLIC BLOOD PRESSURE: 122 MMHG

## 2019-08-13 DIAGNOSIS — M25.552 LATERAL PAIN OF LEFT HIP: Primary | ICD-10-CM

## 2019-08-13 PROCEDURE — 99213 OFFICE O/P EST LOW 20 MIN: CPT | Mod: 25 | Performed by: FAMILY MEDICINE

## 2019-08-13 PROCEDURE — 20611 DRAIN/INJ JOINT/BURSA W/US: CPT | Mod: LT | Performed by: FAMILY MEDICINE

## 2019-08-13 RX ADMIN — BETAMETHASONE SODIUM PHOSPHATE AND BETAMETHASONE ACETATE 6 MG: 3; 3 INJECTION, SUSPENSION INTRA-ARTICULAR; INTRALESIONAL; INTRAMUSCULAR; SOFT TISSUE at 16:55

## 2019-08-13 RX ADMIN — ROPIVACAINE HYDROCHLORIDE 2 ML: 5 INJECTION, SOLUTION EPIDURAL; INFILTRATION; PERINEURAL at 16:55

## 2019-08-13 NOTE — PATIENT INSTRUCTIONS
Diagnosis: Left Lateral Hip pain  Image Findings: Negative MRI for herniated disk  Treatment: steroid injection over greater trochanter, physical therapy  Medications: Limited tylenol/ibuprofen for pain for 1-2 weeks  Follow-up: As needed    It was great seeing you today!    Tye Cam
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
## 2019-08-15 RX ORDER — ROPIVACAINE HYDROCHLORIDE 5 MG/ML
2 INJECTION, SOLUTION EPIDURAL; INFILTRATION; PERINEURAL
Status: DISCONTINUED | OUTPATIENT
Start: 2019-08-13 | End: 2023-12-14

## 2019-08-15 RX ORDER — BETAMETHASONE SODIUM PHOSPHATE AND BETAMETHASONE ACETATE 3; 3 MG/ML; MG/ML
6 INJECTION, SUSPENSION INTRA-ARTICULAR; INTRALESIONAL; INTRAMUSCULAR; SOFT TISSUE
Status: DISCONTINUED | OUTPATIENT
Start: 2019-08-13 | End: 2023-12-14

## 2019-08-23 ENCOUNTER — OFFICE VISIT (OUTPATIENT)
Dept: FAMILY MEDICINE | Facility: CLINIC | Age: 44
End: 2019-08-23
Payer: COMMERCIAL

## 2019-08-23 VITALS
HEART RATE: 82 BPM | WEIGHT: 266 LBS | DIASTOLIC BLOOD PRESSURE: 85 MMHG | BODY MASS INDEX: 41.66 KG/M2 | TEMPERATURE: 97.3 F | RESPIRATION RATE: 18 BRPM | SYSTOLIC BLOOD PRESSURE: 130 MMHG | OXYGEN SATURATION: 97 %

## 2019-08-23 DIAGNOSIS — E66.01 MORBID OBESITY (H): ICD-10-CM

## 2019-08-23 DIAGNOSIS — N94.6 DYSMENORRHEA: ICD-10-CM

## 2019-08-23 DIAGNOSIS — M54.16 LUMBAR RADICULOPATHY: ICD-10-CM

## 2019-08-23 DIAGNOSIS — J06.9 UPPER RESPIRATORY TRACT INFECTION, UNSPECIFIED TYPE: ICD-10-CM

## 2019-08-23 DIAGNOSIS — D50.9 IRON DEFICIENCY ANEMIA, UNSPECIFIED IRON DEFICIENCY ANEMIA TYPE: ICD-10-CM

## 2019-08-23 DIAGNOSIS — Z01.818 PREOP GENERAL PHYSICAL EXAM: Primary | ICD-10-CM

## 2019-08-23 LAB
BASOPHILS # BLD AUTO: 0 10E9/L (ref 0–0.2)
BASOPHILS NFR BLD AUTO: 0.2 %
DIFFERENTIAL METHOD BLD: NORMAL
EOSINOPHIL # BLD AUTO: 0 10E9/L (ref 0–0.7)
EOSINOPHIL NFR BLD AUTO: 0.6 %
ERYTHROCYTE [DISTWIDTH] IN BLOOD BY AUTOMATED COUNT: 13 % (ref 10–15)
FERRITIN SERPL-MCNC: 48 NG/ML (ref 12–150)
HCT VFR BLD AUTO: 43.6 % (ref 35–47)
HGB BLD-MCNC: 14.5 G/DL (ref 11.7–15.7)
LYMPHOCYTES # BLD AUTO: 2 10E9/L (ref 0.8–5.3)
LYMPHOCYTES NFR BLD AUTO: 32.3 %
MCH RBC QN AUTO: 30.9 PG (ref 26.5–33)
MCHC RBC AUTO-ENTMCNC: 33.3 G/DL (ref 31.5–36.5)
MCV RBC AUTO: 93 FL (ref 78–100)
MONOCYTES # BLD AUTO: 0.6 10E9/L (ref 0–1.3)
MONOCYTES NFR BLD AUTO: 8.8 %
NEUTROPHILS # BLD AUTO: 3.6 10E9/L (ref 1.6–8.3)
NEUTROPHILS NFR BLD AUTO: 58.1 %
PLATELET # BLD AUTO: 209 10E9/L (ref 150–450)
RBC # BLD AUTO: 4.7 10E12/L (ref 3.8–5.2)
WBC # BLD AUTO: 6.2 10E9/L (ref 4–11)

## 2019-08-23 PROCEDURE — 85025 COMPLETE CBC W/AUTO DIFF WBC: CPT | Performed by: PHYSICIAN ASSISTANT

## 2019-08-23 PROCEDURE — 36415 COLL VENOUS BLD VENIPUNCTURE: CPT | Performed by: PHYSICIAN ASSISTANT

## 2019-08-23 PROCEDURE — 99214 OFFICE O/P EST MOD 30 MIN: CPT | Performed by: PHYSICIAN ASSISTANT

## 2019-08-23 PROCEDURE — 82728 ASSAY OF FERRITIN: CPT | Performed by: PHYSICIAN ASSISTANT

## 2019-08-23 RX ORDER — TOPIRAMATE 50 MG/1
50 TABLET, FILM COATED ORAL 2 TIMES DAILY
Qty: 180 TABLET | Refills: 1 | Status: SHIPPED | OUTPATIENT
Start: 2019-08-23 | End: 2019-10-10
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
## 2019-08-28 ENCOUNTER — MYC MEDICAL ADVICE (OUTPATIENT)
Dept: FAMILY MEDICINE | Facility: CLINIC | Age: 44
End: 2019-08-28

## 2019-08-29 NOTE — TELEPHONE ENCOUNTER
Symptoms less likely bacterial in that case. Other common causes: GERD, post nasal drainage, viral URI with cough. Also a cough can linger for for a week or 2 after infection is treated

## 2019-08-29 NOTE — TELEPHONE ENCOUNTER
Left message on voice mail for patient to call clinic. [phone]    Bonnie Mckeon RN BSN on 8/29/2019 at 9:37 AM

## 2019-09-03 NOTE — TELEPHONE ENCOUNTER
Spoke with patient.   Providers note read as written.   Patient verbalized understanding.   No further questions at this time.     Patient went through with procedure - no issues at this time.   Patient is taking OTC mucinex.     Carolynn Zimmerman RN BSN PHN

## 2019-09-13 ENCOUNTER — TELEPHONE (OUTPATIENT)
Dept: OBGYN | Facility: CLINIC | Age: 44
End: 2019-09-13

## 2019-09-29 ENCOUNTER — HEALTH MAINTENANCE LETTER (OUTPATIENT)
Age: 44
End: 2019-09-29

## 2019-10-10 ENCOUNTER — OFFICE VISIT (OUTPATIENT)
Dept: OBGYN | Facility: CLINIC | Age: 44
End: 2019-10-10
Payer: COMMERCIAL

## 2019-10-10 ENCOUNTER — OFFICE VISIT (OUTPATIENT)
Dept: FAMILY MEDICINE | Facility: CLINIC | Age: 44
End: 2019-10-10
Payer: COMMERCIAL

## 2019-10-10 VITALS
BODY MASS INDEX: 40.91 KG/M2 | HEART RATE: 82 BPM | SYSTOLIC BLOOD PRESSURE: 122 MMHG | DIASTOLIC BLOOD PRESSURE: 79 MMHG | WEIGHT: 261.2 LBS

## 2019-10-10 VITALS
DIASTOLIC BLOOD PRESSURE: 71 MMHG | HEIGHT: 66 IN | RESPIRATION RATE: 18 BRPM | BODY MASS INDEX: 41.95 KG/M2 | HEART RATE: 74 BPM | SYSTOLIC BLOOD PRESSURE: 124 MMHG | OXYGEN SATURATION: 97 % | TEMPERATURE: 97.4 F | WEIGHT: 261 LBS

## 2019-10-10 DIAGNOSIS — Z00.01 ENCOUNTER FOR ROUTINE ADULT MEDICAL EXAM WITH ABNORMAL FINDINGS: Primary | ICD-10-CM

## 2019-10-10 DIAGNOSIS — Z09 POSTOP CHECK: Primary | ICD-10-CM

## 2019-10-10 DIAGNOSIS — E53.8 B12 DEFICIENCY: ICD-10-CM

## 2019-10-10 DIAGNOSIS — M54.12 CERVICAL RADICULOPATHY: ICD-10-CM

## 2019-10-10 DIAGNOSIS — Z23 NEED FOR TDAP VACCINATION: ICD-10-CM

## 2019-10-10 DIAGNOSIS — D50.8 OTHER IRON DEFICIENCY ANEMIA: ICD-10-CM

## 2019-10-10 DIAGNOSIS — E66.01 MORBID OBESITY (H): ICD-10-CM

## 2019-10-10 DIAGNOSIS — M54.2 NECK PAIN: ICD-10-CM

## 2019-10-10 PROCEDURE — 96372 THER/PROPH/DIAG INJ SC/IM: CPT | Performed by: PHYSICIAN ASSISTANT

## 2019-10-10 PROCEDURE — 99024 POSTOP FOLLOW-UP VISIT: CPT | Performed by: OBSTETRICS & GYNECOLOGY

## 2019-10-10 PROCEDURE — 90472 IMMUNIZATION ADMIN EACH ADD: CPT | Performed by: PHYSICIAN ASSISTANT

## 2019-10-10 PROCEDURE — 90686 IIV4 VACC NO PRSV 0.5 ML IM: CPT | Performed by: PHYSICIAN ASSISTANT

## 2019-10-10 PROCEDURE — 90715 TDAP VACCINE 7 YRS/> IM: CPT | Performed by: PHYSICIAN ASSISTANT

## 2019-10-10 PROCEDURE — 99214 OFFICE O/P EST MOD 30 MIN: CPT | Mod: 25 | Performed by: PHYSICIAN ASSISTANT

## 2019-10-10 PROCEDURE — 99396 PREV VISIT EST AGE 40-64: CPT | Mod: 25 | Performed by: PHYSICIAN ASSISTANT

## 2019-10-10 PROCEDURE — 90471 IMMUNIZATION ADMIN: CPT | Performed by: PHYSICIAN ASSISTANT

## 2019-10-10 RX ORDER — CYANOCOBALAMIN 1000 UG/ML
1000 INJECTION, SOLUTION INTRAMUSCULAR; SUBCUTANEOUS
Status: ACTIVE | OUTPATIENT
Start: 2019-10-10

## 2019-10-10 RX ORDER — TOPIRAMATE 50 MG/1
50 TABLET, FILM COATED ORAL 2 TIMES DAILY
Qty: 180 TABLET | Refills: 1
Start: 2019-10-10 | End: 2020-03-19

## 2019-10-10 RX ORDER — PHENTERMINE HYDROCHLORIDE 15 MG/1
15 CAPSULE ORAL EVERY MORNING
Qty: 30 CAPSULE | Refills: 2 | Status: SHIPPED | OUTPATIENT
Start: 2019-10-10 | End: 2020-01-14

## 2019-10-10 RX ADMIN — CYANOCOBALAMIN 1000 MCG: 1000 INJECTION, SOLUTION INTRAMUSCULAR; SUBCUTANEOUS at 12:49

## 2019-10-10 ASSESSMENT — MIFFLIN-ST. JEOR: SCORE: 1859.77

## 2019-10-10 NOTE — PROGRESS NOTES
Jeanne is a 44 year old  6 weeks s/p  Total Abdominal Hysterectomy and Bilateral salpingectomycomplicated by no problems reported.  She is currently requiring nothing for pain management.  - vaginal bleeding, - hot flashes.  - GI/ complaints.  Energy level is High.  Denies fever.    The pathology report showed:   Final Diagnosis Uterus, cervix and fallopian tubes, hysterectomy and bilateral salpingectomy -  1.  Disordered proliferative endometrium.  2.  Adenomyosis.  3.  Multiple paratubal cysts.  4.  Benign posterior cervix, transformation zone not visualized in anterior cervix, see comment.         PE: /79   Pulse 82   Wt 118.5 kg (261 lb 3.2 oz)   LMP 2019 (Approximate)   Breastfeeding? No   BMI 40.91 kg/m    Abd: soft, non tender, no masses  Incision: intact, no erythema, induration or discharge  Ext. Genitalia: Normal  Vagina:cuff healing, no lesions, Normal mucosa, no discharge    BME: normal    A/P    ICD-10-CM    1. Postop check Z09         6 weeks s/p surgery, doing well . Released to normal activities.        CEPHAS AGBEH, MD.
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
## 2019-10-10 NOTE — NURSING NOTE
Clinic Administered Medication Documentation    MEDICATION LIST:   Injectable Medication Documentation    Patient was given Cyanocobalamin (B-12). Prior to medication administration, verified patients identity using patient s name and date of birth. Please see MAR and medication order for additional information.       Was entire vial of medication used? Yes  Vial/Syringe: Single dose vial  Expiration Date:  02/2021  Was this medication supplied by the patient? No

## 2019-10-10 NOTE — PATIENT INSTRUCTIONS
Your weight loss goal over the next 3 months is 13 pounds.  Follow up with ancillary in 3 months for a weight/med check.     Preventive Health Recommendations  Female Ages 40 to 49    Yearly exam:     See your health care provider every year in order to  1. Review health changes.   2. Discuss preventive care.    3. Review your medicines if your doctor prescribed any.      Get a Pap test every three years (unless you have an abnormal result and your provider advises testing more often).      If you get Pap tests with HPV test, you only need to test every 5 years, unless you have an abnormal result. You do not need a Pap test if your uterus was removed (hysterectomy) and you have not had cancer.      You should be tested each year for STDs (sexually transmitted diseases), if you're at risk.     Ask your doctor if you should have a mammogram.      Have a colonoscopy (test for colon cancer) if someone in your family has had colon cancer or polyps before age 50.       Have a cholesterol test every 5 years.       Have a diabetes test (fasting glucose) after age 45. If you are at risk for diabetes, you should have this test every 3 years.    Shots: Get a flu shot each year. Get a tetanus shot every 10 years.     Nutrition:     Eat at least 5 servings of fruits and vegetables each day.    Eat whole-grain bread, whole-wheat pasta and brown rice instead of white grains and rice.    Get adequate Calcium and Vitamin D.      Lifestyle    Exercise at least 150 minutes a week (an average of 30 minutes a day, 5 days a week). This will help you control your weight and prevent disease.    Limit alcohol to one drink per day.    No smoking.     Wear sunscreen to prevent skin cancer.    See your dentist every six months for an exam and cleaning.

## 2019-10-10 NOTE — PATIENT INSTRUCTIONS
If you have any questions regarding your visit, Please contact your care team.  [facility]: [phone]  [facility] CLINIC HOURS TELEPHONE NUMBER   Cephas Agbeh, M.D. Lisa -       Gricel Villalobos         Monday-Gian    8:00a.m-4:45 p.m    Tuesday--[place]     8:00a.m-4:45 p.m.    Thursday-[place]ian    8:00a.m-4:45 p.m.    Friday-[place]    8:00a.m-4:45 p.m    [facility]   [address] [place] [address]   [phone]-Ask for [facility]   Fax [phone]   Haryakv-[phone]     [facility]   [address] [place] [address] [place] [address]   [phone]    [facility]  [address] [facility] [address]  [phone]  Rnnyghm-[phone]   Urgent Care locations:    [facility] Monday-Friday  5 pm - 9 pm  Saturday and Sunday   9 am - 5 pm   Monday-Friday   5 pm - 9 pm  Saturday and Sunday  9 am - 5 pm    [phone]   If you need a medication refill, please contact your pharmacy. Please allow 3 business days for your refill to be completed.  As always, Thank you for trusting us with your healthcare needs!

## 2019-10-28 ENCOUNTER — HEALTH MAINTENANCE LETTER (OUTPATIENT)
Age: 44
End: 2019-10-28

## 2020-01-03 ENCOUNTER — MYC REFILL (OUTPATIENT)
Dept: FAMILY MEDICINE | Facility: CLINIC | Age: 45
End: 2020-01-03

## 2020-01-03 ENCOUNTER — MYC MEDICAL ADVICE (OUTPATIENT)
Dept: FAMILY MEDICINE | Facility: CLINIC | Age: 45
End: 2020-01-03

## 2020-01-03 DIAGNOSIS — E66.01 MORBID OBESITY (H): ICD-10-CM

## 2020-01-06 RX ORDER — PHENTERMINE HYDROCHLORIDE 15 MG/1
15 CAPSULE ORAL EVERY MORNING
Qty: 30 CAPSULE | Refills: 2 | OUTPATIENT
Start: 2020-01-06

## 2020-01-13 DIAGNOSIS — E66.01 MORBID OBESITY (H): ICD-10-CM

## 2020-01-13 NOTE — TELEPHONE ENCOUNTER
PHENTERMINE HCL 15MG CAPSULES  Last Written Prescription Date:  10/10/2019  Last Fill Quantity: 30,  # refills: 2   Last office visit: 10/10/2019 with prescribing provider:  shea   Future Office Visit:   Next 5 appointments (look out 90 days)    Jan 31, 2020 11:30 AM CST  Nurse Only with [facility]  [facility] [address] [facility] [address]  [phone]         Requested Prescriptions   Pending Prescriptions Disp Refills     phentermine (ADIPEX-P) 15 MG capsule [Pharmacy Med Name: PHENTERMINE HCL 15MG CAPSULES] 30 capsule      Sig: TAKE 1 CAPSULE(15 MG) BY MOUTH EVERY MORNING       There is no refill protocol information for this order

## 2020-01-13 NOTE — TELEPHONE ENCOUNTER
Patient states that she needs a refill on phentermine until she can come in on 1-31-20 and she will be out of town.    Thank you.

## 2020-01-14 ENCOUNTER — TELEPHONE (OUTPATIENT)
Dept: FAMILY MEDICINE | Facility: CLINIC | Age: 45
End: 2020-01-14

## 2020-01-14 RX ORDER — PHENTERMINE HYDROCHLORIDE 15 MG/1
CAPSULE ORAL
Qty: 14 CAPSULE | Refills: 0 | Status: SHIPPED | OUTPATIENT
Start: 2020-01-14 | End: 2020-02-01

## 2020-01-14 NOTE — TELEPHONE ENCOUNTER
Prior Authorization Retail Medication Request    Medication/Dose: phentermine (ADIPEX-P) 15 MG capsule  ICD code (if different than what is on RX):  E66.01  Previously Tried and Failed:    Rationale:      Insurance Name:  [facility]  Insurance ID:  [id number]      Pharmacy Information (if different than what is on RX)  Name: [facility]   Phone:  [phone]

## 2020-01-15 NOTE — TELEPHONE ENCOUNTER
Central Prior Authorization Team   Phone: [phone]    PA Initiation    Medication: phentermine (ADIPEX-P) 15 MG capsule  Insurance Company: [employer] - Phone [phone] Fax [phone]  [id number] - [place] [id number] [facility]  Filling Pharmacy Phone: [phone]  Filling Pharmacy Fax: [phone]  Start Date: 1/15/2020

## 2020-01-16 ENCOUNTER — MYC MEDICAL ADVICE (OUTPATIENT)
Dept: FAMILY MEDICINE | Facility: CLINIC | Age: 45
End: 2020-01-16

## 2020-01-16 NOTE — TELEPHONE ENCOUNTER
PRIOR AUTHORIZATION DENIED    Medication: phentermine (ADIPEX-P) 15 MG-DENIED    Denial Date: 1/15/2020    Denial Rational: INSURANCE WILL ONLY COVER 3 MONTHS OF THERAPY WITHIN A YEAR.        Appeal Information:  IF YOU WOULD LIKE TO APPEAL PLEASE SUPPLY PA TEAM WITH A LETTER OF MEDICAL NECESSITY WITH CLINICAL REASON.

## 2020-01-31 ENCOUNTER — ALLIED HEALTH/NURSE VISIT (OUTPATIENT)
Dept: NURSING | Facility: CLINIC | Age: 45
End: 2020-01-31
Payer: COMMERCIAL

## 2020-01-31 ENCOUNTER — MYC MEDICAL ADVICE (OUTPATIENT)
Dept: FAMILY MEDICINE | Facility: CLINIC | Age: 45
End: 2020-01-31

## 2020-01-31 VITALS — WEIGHT: 251.6 LBS | HEIGHT: 67 IN | BODY MASS INDEX: 39.49 KG/M2

## 2020-01-31 DIAGNOSIS — D50.9 IRON DEFICIENCY ANEMIA, UNSPECIFIED IRON DEFICIENCY ANEMIA TYPE: Primary | ICD-10-CM

## 2020-01-31 DIAGNOSIS — E53.8 B12 DEFICIENCY: ICD-10-CM

## 2020-01-31 DIAGNOSIS — D50.8 OTHER IRON DEFICIENCY ANEMIA: ICD-10-CM

## 2020-01-31 LAB
BASOPHILS # BLD AUTO: 0 10E9/L (ref 0–0.2)
BASOPHILS NFR BLD AUTO: 0.1 %
DIFFERENTIAL METHOD BLD: NORMAL
EOSINOPHIL # BLD AUTO: 0 10E9/L (ref 0–0.7)
EOSINOPHIL NFR BLD AUTO: 0.5 %
ERYTHROCYTE [DISTWIDTH] IN BLOOD BY AUTOMATED COUNT: 13.7 % (ref 10–15)
HCT VFR BLD AUTO: 44.9 % (ref 35–47)
HGB BLD-MCNC: 14.9 G/DL (ref 11.7–15.7)
LYMPHOCYTES # BLD AUTO: 2.6 10E9/L (ref 0.8–5.3)
LYMPHOCYTES NFR BLD AUTO: 33.6 %
MCH RBC QN AUTO: 30 PG (ref 26.5–33)
MCHC RBC AUTO-ENTMCNC: 33.2 G/DL (ref 31.5–36.5)
MCV RBC AUTO: 90 FL (ref 78–100)
MONOCYTES # BLD AUTO: 0.7 10E9/L (ref 0–1.3)
MONOCYTES NFR BLD AUTO: 8.8 %
NEUTROPHILS # BLD AUTO: 4.3 10E9/L (ref 1.6–8.3)
NEUTROPHILS NFR BLD AUTO: 57 %
PLATELET # BLD AUTO: 244 10E9/L (ref 150–450)
RBC # BLD AUTO: 4.97 10E12/L (ref 3.8–5.2)
VIT B12 SERPL-MCNC: 540 PG/ML (ref 193–986)
WBC # BLD AUTO: 7.6 10E9/L (ref 4–11)

## 2020-01-31 PROCEDURE — 82728 ASSAY OF FERRITIN: CPT | Performed by: PHYSICIAN ASSISTANT

## 2020-01-31 PROCEDURE — 99207 ZZC NO CHARGE NURSE ONLY: CPT

## 2020-01-31 PROCEDURE — 96372 THER/PROPH/DIAG INJ SC/IM: CPT

## 2020-01-31 PROCEDURE — 36415 COLL VENOUS BLD VENIPUNCTURE: CPT | Performed by: PHYSICIAN ASSISTANT

## 2020-01-31 PROCEDURE — 82607 VITAMIN B-12: CPT | Performed by: PHYSICIAN ASSISTANT

## 2020-01-31 PROCEDURE — 85025 COMPLETE CBC W/AUTO DIFF WBC: CPT | Performed by: PHYSICIAN ASSISTANT

## 2020-01-31 RX ADMIN — CYANOCOBALAMIN 1000 MCG: 1000 INJECTION, SOLUTION INTRAMUSCULAR; SUBCUTANEOUS at 11:35

## 2020-01-31 ASSESSMENT — PATIENT HEALTH QUESTIONNAIRE - PHQ9: SUM OF ALL RESPONSES TO PHQ QUESTIONS 1-9: 0

## 2020-01-31 ASSESSMENT — MIFFLIN-ST. JEOR: SCORE: 1822.13

## 2020-01-31 NOTE — PROGRESS NOTES
Clinic Administered Medication Documentation    MEDICATION LIST:   Injectable Medication Documentation    Patient was given Cyanocobalamin (B-12). Prior to medication administration, verified patients identity using patient s name and date of birth. Please see MAR and medication order for additional information. Patient instructed to remain in clinic for 15 minutes and report any adverse reaction to staff immediately .      Was entire vial of medication used? Yes  Vial/Syringe: Single dose vial  Expiration Date:  2/2021  Was this medication supplied by the patient? No     B 12 injection given Left deltoid  Lor Tate CMA      Patient has questions for you and would like you to call her back. Patient said she needs refills of the Phentermine today. She said it's helping with the depression. Patient wants to talk to you. Patient said she just had blood drawn. She has questions about infusion depending on the resultsof the bloodwork.

## 2020-02-01 ENCOUNTER — MYC REFILL (OUTPATIENT)
Dept: FAMILY MEDICINE | Facility: CLINIC | Age: 45
End: 2020-02-01

## 2020-02-01 DIAGNOSIS — E66.01 MORBID OBESITY (H): ICD-10-CM

## 2020-02-01 LAB — FERRITIN SERPL-MCNC: 27 NG/ML (ref 12–150)

## 2020-02-03 ENCOUNTER — TELEPHONE (OUTPATIENT)
Dept: FAMILY MEDICINE | Facility: CLINIC | Age: 45
End: 2020-02-03

## 2020-02-03 RX ORDER — PHENTERMINE HYDROCHLORIDE 15 MG/1
CAPSULE ORAL
Qty: 14 CAPSULE | OUTPATIENT
Start: 2020-02-03

## 2020-02-03 RX ORDER — PHENTERMINE HYDROCHLORIDE 15 MG/1
15 CAPSULE ORAL EVERY MORNING
Qty: 30 CAPSULE | Refills: 2 | Status: SHIPPED | OUTPATIENT
Start: 2020-02-03 | End: 2020-04-22

## 2020-02-03 NOTE — TELEPHONE ENCOUNTER
Her new weight loss goal is 12 pounds over the next 3 months. She'll need to follow up with me (COLLIN) in 3 months (May) to discuss weight/refills

## 2020-02-03 NOTE — TELEPHONE ENCOUNTER
Prior Authorization Retail Medication Request    Medication/Dose: phentermine (ADIPEX-P) 15 MG capsule    ICD code (if different than what is on RX):  Morbid obesity (H) [E66.01]     Previously Tried and Failed:    Rationale:     Insurance Name:  [facility]  Insurance ID:  [id number]      Pharmacy Information (if different than what is on RX)  Name:   Phone:

## 2020-02-03 NOTE — TELEPHONE ENCOUNTER
Requested Prescriptions   Pending Prescriptions Disp Refills     phentermine (ADIPEX-P) 15 MG capsule [Pharmacy Med Name: PHENTERMINE HCL 15MG CAPSULES]  PHENTERMINE HCL 15MG CAPSULES      Last Written Prescription Date:  01/14/20  Last Fill Quantity: 14,   # refills: 0  Last Office Visit: 10/10/19  VIKKI Cortez  Future Office visit:       Routing refill request to provider for review/approval because:  Drug not on the G, P or  Health refill protocol or controlled substance   14 capsule      Sig: TAKE 1 CAPSULE(15 MG) BY MOUTH EVERY MORNING       There is no refill protocol information for this order

## 2020-02-03 NOTE — TELEPHONE ENCOUNTER
"Routing refill request to provider for review/approval because:  Patient needs to be seen because:  Pt was due 10/23/19 for weight check, weight check was done 1/31  Vital Signs 1/31/2020   Respirations    Weight (LB) 251 lb 9.6 oz   Height 5' 6.575\"   BMI (Calculated) 39.91         "

## 2020-03-19 DIAGNOSIS — M54.12 CERVICAL RADICULOPATHY: Primary | ICD-10-CM

## 2020-03-19 DIAGNOSIS — M54.2 NECK PAIN: ICD-10-CM

## 2020-03-19 DIAGNOSIS — M54.12 CERVICAL RADICULOPATHY: ICD-10-CM

## 2020-03-19 RX ORDER — TOPIRAMATE 50 MG/1
TABLET, FILM COATED ORAL
Qty: 60 TABLET | Refills: 0 | Status: SHIPPED | OUTPATIENT
Start: 2020-03-19 | End: 2020-08-10

## 2020-03-19 NOTE — TELEPHONE ENCOUNTER
Please let patient know she needs an e-visit/phone encounter as well as a blood draw for further refills ASAP. This is a one time refill. Labs ordered.

## 2020-03-19 NOTE — TELEPHONE ENCOUNTER
"Requested Prescriptions   Pending Prescriptions Disp Refills     topiramate (TOPAMAX) 50 MG tablet [Pharmacy Med Name: TOPIRAMATE 50MG TABLETS] 180 tablet 1     Sig: TAKE 1 TABLET BY MOUTH TWICE DAILY   Last Written Prescription Date:  10/10/19  Last Fill Quantity: 180,  # refills: 1   Last office visit: 10/10/2019 with prescribing provider:  VIKKI Cortez   Future Office Visit:        Anti-Seizure Meds Protocol  Failed - 3/19/2020  9:24 AM        Failed - Review Authorizing provider's last note.      Refer to last progress notes: confirm request is for original authorizing provider (cannot be through other providers).          Failed - Normal ALT or AST on file in past 26 months     No lab results found.  No lab results found.          Passed - Recent (12 mo) or future (30 days) visit within the authorizing provider's specialty     Patient has had an office visit with the authorizing provider or a provider within the authorizing providers department within the previous 12 mos or has a future within next 30 days. See \"Patient Info\" tab in inbasket, or \"Choose Columns\" in Meds & Orders section of the refill encounter.              Passed - Normal CBC on file in past 26 months     Recent Labs   Lab Test 01/31/20  1120   WBC 7.6   RBC 4.97   HGB 14.9   HCT 44.9                    Passed - Normal platelet count on file in past 26 months     Recent Labs   Lab Test 01/31/20  1120                  Passed - Medication is active on med list        Passed - No active pregnancy on record        Passed - No positive pregnancy test in last 12 months             "

## 2020-04-18 DIAGNOSIS — E66.01 MORBID OBESITY (H): ICD-10-CM

## 2020-04-20 NOTE — TELEPHONE ENCOUNTER
Requested Prescriptions   Pending Prescriptions Disp Refills     phentermine (ADIPEX-P) 15 MG capsule [Pharmacy Med Name: PHENTERMINE HCL 15MG CAPSULES] 30 capsule      Sig: TAKE 1 CAPSULE(15 MG) BY MOUTH EVERY MORNING   Last Written Prescription Date:  4-18-20  Last Fill Quantity: 30,  # refills: 0   Last office visit: 10/10/2019 with prescribing provider:  10-10-19   Future Office Visit:      There is no refill protocol information for this order

## 2020-04-22 RX ORDER — PHENTERMINE HYDROCHLORIDE 15 MG/1
CAPSULE ORAL
Qty: 30 CAPSULE | OUTPATIENT
Start: 2020-04-22

## 2020-04-22 NOTE — TELEPHONE ENCOUNTER
Duplicate refill request for Phentermine 15 mg.   Refill authorized by Anh Cortez today.   Refused this request with message to the pharmacy.     Bonnie Mckeon RN BSN

## 2020-06-09 ENCOUNTER — TELEPHONE (OUTPATIENT)
Dept: FAMILY MEDICINE | Facility: CLINIC | Age: 45
End: 2020-06-09

## 2020-07-14 ENCOUNTER — VIRTUAL VISIT (OUTPATIENT)
Dept: FAMILY MEDICINE | Facility: OTHER | Age: 45
End: 2020-07-14
Payer: COMMERCIAL

## 2020-07-14 PROCEDURE — 99421 OL DIG E/M SVC 5-10 MIN: CPT | Performed by: NURSE PRACTITIONER

## 2020-07-15 NOTE — PROGRESS NOTES
"Date: 2020 21:20:56  Clinician: Brianna Campbell  Clinician NPI: [id number]  Patient: Jeanne Wasserman  Patient : 1975  Patient Address: [address] [place], [place] [id number]  Patient Phone: [phone]  Visit Protocol: URI  Patient Summary:  Jeanne is a 45 year old ( : 1975 ) female who initiated a Visit for COVID-19 (Coronavirus) evaluation and screening. When asked the question \"Please sign me up to receive news, health information and promotions from [employer].\", Jeanne responded \"No\".    Jeanne states her symptoms started 1-2 days ago.   Her symptoms consist of ageusia, rhinitis, malaise, a headache, chills, a sore throat, myalgia, anosmia, and a cough. She is experiencing mild difficulty breathing with activities but can speak normally in full sentences. Jeanne also feels feverish but was unable to measure her temperature.   Symptom details     Nasal secretions: The color of her mucus is clear.    Cough: Jeanne coughs a few times an hour and her cough is not more bothersome at night. Phlegm does not come into her throat when she coughs. She believes her cough is caused by post-nasal drip.     Sore throat: Jeanne reports having moderate throat pain (4-6 on a 10 point pain scale), does not have exudate on her tonsils, and can swallow liquids. She is not sure if the lymph nodes in her neck are enlarged. A rash has not appeared on the skin since the sore throat started.     Headache: She states the headache is moderate (4-6 on a 10 point pain scale).      Jeanne denies having wheezing, nausea, teeth pain, diarrhea, vomiting, ear pain, facial pain or pressure, and nasal congestion. She also denies having recent facial or sinus surgery in the past 60 days, taking antibiotic medication in the past month, and having a sinus infection within the past year.   Precipitating events  Jeanne is not sure if she has been exposed to someone with strep throat. She has not recently been " exposed to someone with influenza. Jeanne has been in close contact with the following high risk individuals: people with asthma, heart disease or diabetes and adults 65 or older.   Pertinent COVID-19 (Coronavirus) information  In the past 14 days, Jeanne has not worked in a congregate living setting.   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 She does not live with a healthcare worker.   Jeanne has had a close contact with a laboratory-confirmed COVID-19 patient within 14 days of symptom onset. She was not exposed at her work. Additional information about contact with COVID-19 (Coronavirus) patient as reported by the patient (free text): Exposed in a social setting   Pertinent medical history  Jeanne typically gets a yeast infection when she takes antibiotics. She is not sure if she has used fluconazole (Diflucan) to treat previous yeast infections.   Jeanne does not need a return to work/school note.   Weight: 220 lbs   Jeanne does not smoke or use smokeless tobacco.   She denies pregnancy and denies breastfeeding. She does not menstruate.   Weight: 220 lbs    MEDICATIONS: phentermine oral, Topamax oral, ALLERGIES: morphine  Clinician Response:  Dear Jeanne,   Your symptoms show that you may have coronavirus (COVID-19). This illness can cause fever, cough and trouble breathing. Many people get a mild case and get better on their own. Some people can get very sick.  What should I do?  We would like to test you for this virus.   1. Please call [phone] to schedule your visit. Explain that [facility]. Be ready to share your [facility] visit ID number.  The following will serve as  "your written order for this COVID Test, ordered by me, for the indication of suspected COVID [Z20.828]: The test will be ordered in [facility]ali, our electronic health record, after you are scheduled. It will show as ordered and authorized by Brenden Marino MD.  Order: COVID-19 (Coronavirus) PCR for SYMPTOMATIC testing from [facility].      2. When it's time for your COVID test:  Stay at least 6 feet away from others. (If someone will drive you to your test, stay in the backseat, as far away from the  as you can.)   Cover your mouth and nose with a mask, tissue or washcloth.  Go straight to the testing site. Don't make any stops on the way there or back.      3.Starting now: Stay home and away from others (self-isolate) until:   You've had no fever---and no medicine that reduces fever---for 3 full days (72 hours). And...   Your other symptoms have gotten better. For example, your cough or breathing has improved. And...   At least 10 days have passed since your symptoms started.       During this time, don't leave the house except for testing or medical care.   Stay in your own room, even for meals. Use your own bathroom if you can.   Stay away from others in your home. No hugging, kissing or shaking hands. No visitors.  Don't go to work, school or anywhere else.    Clean \"high touch\" surfaces often (doorknobs, counters, handles, etc.). Use a household cleaning spray or wipes. You'll find a full list of  on the EPA website: www.epa.gov/pesticide-registration/list-n-disinfectants-use-against-sars-cov-2.   Cover your mouth and nose with a mask, tissue or washcloth to avoid spreading germs.  Wash your hands and face often. Use soap and water.  Caregivers in these groups are at risk for severe illness due to COVID-19:  o People 65 years and older  o People who live in a nursing home or long-term care facility  o People with chronic disease (lung, heart, cancer, diabetes, kidney, liver, immunologic)  o People who have a " weakened immune system, including those who:   Are in cancer treatment  Take medicine that weakens the immune system, such as corticosteroids  Had a bone marrow or organ transplant  Have an immune deficiency  Have poorly controlled HIV or AIDS  Are obese (body mass index of 40 or higher)  Smoke regularly   o Caregivers should wear gloves while washing dishes, handling laundry and cleaning bedrooms and bathrooms.  o Use caution when washing and drying laundry: Don't shake dirty laundry, and use the warmest water setting that you can.  o For more tips, go to www.cdc.gov/coronavirus/2019-ncov/downloads/10Things.pdf.    4.Sign up for [employer]. We know it's scary to hear that you might have COVID-19. We want to track your symptoms to make sure you're okay over the next 2 weeks. Please look for an email from [employer]---this is a free, online program that we'll use to keep in touch. To sign up, follow the link in the email. Learn more at [email or web address]  How can I take care of myself?   Get lots of rest. Drink extra fluids (unless a doctor has told you not to).   Take Tylenol (acetaminophen) for fever or pain. If you have liver or kidney problems, ask your family doctor if it's okay to take Tylenol.   Adults can take either:    650 mg (two 325 mg pills) every 4 to 6 hours, or...   1,000 mg (two 500 mg pills) every 8 hours as needed.    Note: Don't take more than 3,000 mg in one day. Acetaminophen is found in many medicines (both prescribed and over-the-counter medicines). Read all labels to be sure you don't take too much.   For children, check the Tylenol bottle for the right dose. The dose is based on the child's age or weight.    If you have other health problems (like cancer, heart failure, an organ transplant or severe kidney disease): Call your specialty clinic if you don't feel better in the next 2 days.       Know when to call 911. Emergency warning signs include:    Trouble breathing or  shortness of breath Pain or pressure in the chest that doesn't go away Feeling confused like you haven't felt before, or not being able to wake up Bluish-colored lips or face.  Where can I get more information?   [facility] -- About COVID-19: [email or web address]   CDC -- What to Do If You're Sick: www.cdc.gov/coronavirus/2019-ncov/about/steps-when-sick.html   CDC -- Ending Home Isolation: www.cdc.gov/coronavirus/2019-ncov/hcp/disposition-in-home-patients.html   [facility] -- Caring for Someone: www.cdc.gov/coronavirus/2019-ncov/if-you-are-sick/care-for-someone.html   [facility] -- Interim Guidance for Hospital Discharge to Home: www.[facility].mn.us/diseases/coronavirus/hcp/hospdischarge.pdf   [facility] clinical trials (COVID-19 research studies): clinicalaffairs.[facility]-clinical-trials    Below are the COVID-19 hotlines at the [facility] ([facility]). Interpreters are available.    For health questions: Call 497-474-2796 or 1-560.370.2936 (7 a.m. to 7 p.m.) For questions about schools and childcare: Call 020-606-2687 or 1-936.768.8872 (7 a.m. to 7 p.m.)    Diagnosis: Other malaise  Diagnosis ICD: R53.81

## 2020-08-11 ENCOUNTER — MYC MEDICAL ADVICE (OUTPATIENT)
Dept: FAMILY MEDICINE | Facility: CLINIC | Age: 45
End: 2020-08-11

## 2020-08-11 ENCOUNTER — TELEPHONE (OUTPATIENT)
Dept: FAMILY MEDICINE | Facility: CLINIC | Age: 45
End: 2020-08-11

## 2020-08-11 NOTE — TELEPHONE ENCOUNTER
Central Prior Authorization Team   Phone: [phone]      PA Initiation    Medication: phentermine (ADIPEX-P) 15 MG capsule - INITIATED  Insurance Company: [employer] - Phone [phone] Fax [phone]  [id number] - [place], [place] - [address] AT [facility]  Filling Pharmacy Phone: [phone]  Filling Pharmacy Fax: [phone]  Start Date: 8/11/2020

## 2020-08-11 NOTE — TELEPHONE ENCOUNTER
Central Prior Authorization Team   Phone: [phone]      PRIOR AUTHORIZATION DENIED    Medication: phentermine (ADIPEX-P) 15 MG capsule - DENIED    Denial Date: 8/11/2020    Denial Rational:     Appeal Information:

## 2020-11-03 ENCOUNTER — ANCILLARY PROCEDURE (OUTPATIENT)
Dept: GENERAL RADIOLOGY | Facility: CLINIC | Age: 45
End: 2020-11-03
Attending: FAMILY MEDICINE
Payer: COMMERCIAL

## 2020-11-03 ENCOUNTER — OFFICE VISIT (OUTPATIENT)
Dept: ORTHOPEDICS | Facility: CLINIC | Age: 45
End: 2020-11-03
Payer: COMMERCIAL

## 2020-11-03 VITALS
HEIGHT: 67 IN | SYSTOLIC BLOOD PRESSURE: 130 MMHG | HEART RATE: 80 BPM | DIASTOLIC BLOOD PRESSURE: 84 MMHG | BODY MASS INDEX: 39.9 KG/M2

## 2020-11-03 DIAGNOSIS — M54.2 CERVICALGIA: Primary | ICD-10-CM

## 2020-11-03 DIAGNOSIS — M25.511 ACUTE PAIN OF RIGHT SHOULDER: ICD-10-CM

## 2020-11-03 DIAGNOSIS — M54.2 CERVICALGIA: ICD-10-CM

## 2020-11-03 PROCEDURE — 99214 OFFICE O/P EST MOD 30 MIN: CPT | Performed by: FAMILY MEDICINE

## 2020-11-03 PROCEDURE — 73030 X-RAY EXAM OF SHOULDER: CPT | Mod: RT | Performed by: RADIOLOGY

## 2020-11-03 PROCEDURE — 72040 X-RAY EXAM NECK SPINE 2-3 VW: CPT | Performed by: RADIOLOGY

## 2020-11-03 RX ORDER — NABUMETONE 500 MG/1
500 TABLET, FILM COATED ORAL 2 TIMES DAILY
Qty: 30 TABLET | Refills: 1 | Status: SHIPPED | OUTPATIENT
Start: 2020-11-03 | End: 2021-04-22
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
## 2020-11-03 NOTE — PATIENT INSTRUCTIONS
Diagnosis: Right Shoulder injury concern for possible full thickness rotator cuff tear  Image Findings: normal shoulder xray aside from AC joint arthritis  Treatment: right shoulder MRI, range of motion at home  Job: no restrictions  Medications: Relafen ordered today  Follow-up: after right shoulder MRI    MRI Scheduling Appointments  Call: [phone]  Toll-Free: [phone]  Fax: [phone]    Please call [phone]   Ask for my team if you have any questions or concerns    It was great seeing you again today!    Tye Cam MD, CAQSM
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
## 2020-11-16 ENCOUNTER — MYC MEDICAL ADVICE (OUTPATIENT)
Dept: FAMILY MEDICINE | Facility: CLINIC | Age: 45
End: 2020-11-16

## 2020-11-16 DIAGNOSIS — E66.01 MORBID OBESITY (H): ICD-10-CM

## 2020-11-16 RX ORDER — PHENTERMINE HYDROCHLORIDE 15 MG/1
15 CAPSULE ORAL EVERY MORNING
Qty: 30 CAPSULE | Refills: 2 | Status: CANCELLED | OUTPATIENT
Start: 2020-11-16

## 2020-11-16 NOTE — TELEPHONE ENCOUNTER
Requested Prescriptions   Pending Prescriptions Disp Refills     phentermine (ADIPEX-P) 15 MG capsule 30 capsule 2     Sig: Take 1 capsule (15 mg) by mouth every morning   Last Written Prescription Date:  10-12-20  Last Fill Quantity: 30,  # refills: 2   Last office visit: 10/10/2019 with prescribing provider:  10-10-19   Future Office Visit:            There is no refill protocol information for this order

## 2020-11-16 NOTE — TELEPHONE ENCOUNTER
Last Written Prescription Date:  8/10/2020  Last Fill Quantity: 30,  # refills: 2   Last office visit: 10/10/2019 with prescribing provider:  Anh Cortez   Future Office Visit: none    Routing refill request to provider for review/approval because:  Drug not on the [facility] refill protocol     Millicent Arnold, RN, BSN

## 2020-11-17 ENCOUNTER — TELEPHONE (OUTPATIENT)
Dept: FAMILY MEDICINE | Facility: CLINIC | Age: 45
End: 2020-11-17

## 2020-11-17 ENCOUNTER — VIRTUAL VISIT (OUTPATIENT)
Dept: FAMILY MEDICINE | Facility: CLINIC | Age: 45
End: 2020-11-17
Payer: COMMERCIAL

## 2020-11-17 ENCOUNTER — OFFICE VISIT (OUTPATIENT)
Dept: ORTHOPEDICS | Facility: CLINIC | Age: 45
End: 2020-11-17
Payer: COMMERCIAL

## 2020-11-17 ENCOUNTER — TELEPHONE (OUTPATIENT)
Dept: ORTHOPEDICS | Facility: CLINIC | Age: 45
End: 2020-11-17

## 2020-11-17 ENCOUNTER — ANCILLARY PROCEDURE (OUTPATIENT)
Dept: MRI IMAGING | Facility: CLINIC | Age: 45
End: 2020-11-17
Attending: FAMILY MEDICINE
Payer: COMMERCIAL

## 2020-11-17 VITALS — HEIGHT: 67 IN | BODY MASS INDEX: 39.9 KG/M2

## 2020-11-17 DIAGNOSIS — M54.2 NECK PAIN: ICD-10-CM

## 2020-11-17 DIAGNOSIS — E66.811 CLASS 1 OBESITY WITHOUT SERIOUS COMORBIDITY WITH BODY MASS INDEX (BMI) OF 33.0 TO 33.9 IN ADULT, UNSPECIFIED OBESITY TYPE: Primary | ICD-10-CM

## 2020-11-17 DIAGNOSIS — M25.511 CHRONIC RIGHT SHOULDER PAIN: ICD-10-CM

## 2020-11-17 DIAGNOSIS — M19.011 GLENOHUMERAL ARTHRITIS, RIGHT: Primary | ICD-10-CM

## 2020-11-17 DIAGNOSIS — M25.511 ACUTE PAIN OF RIGHT SHOULDER: ICD-10-CM

## 2020-11-17 DIAGNOSIS — M75.101 TEAR OF RIGHT ROTATOR CUFF, UNSPECIFIED TEAR EXTENT, UNSPECIFIED WHETHER TRAUMATIC: ICD-10-CM

## 2020-11-17 DIAGNOSIS — M54.12 CERVICAL RADICULOPATHY: ICD-10-CM

## 2020-11-17 DIAGNOSIS — M25.511 ACUTE PAIN OF RIGHT SHOULDER: Primary | ICD-10-CM

## 2020-11-17 DIAGNOSIS — G89.29 CHRONIC RIGHT SHOULDER PAIN: ICD-10-CM

## 2020-11-17 PROCEDURE — 99213 OFFICE O/P EST LOW 20 MIN: CPT | Mod: 95 | Performed by: PHYSICIAN ASSISTANT

## 2020-11-17 PROCEDURE — 73221 MRI JOINT UPR EXTREM W/O DYE: CPT | Mod: RT | Performed by: RADIOLOGY

## 2020-11-17 PROCEDURE — 20611 DRAIN/INJ JOINT/BURSA W/US: CPT | Mod: RT | Performed by: FAMILY MEDICINE

## 2020-11-17 PROCEDURE — 99213 OFFICE O/P EST LOW 20 MIN: CPT | Mod: 25 | Performed by: FAMILY MEDICINE

## 2020-11-17 RX ORDER — PHENTERMINE HYDROCHLORIDE 15 MG/1
15 CAPSULE ORAL EVERY MORNING
Qty: 30 CAPSULE | Refills: 2 | Status: SHIPPED | OUTPATIENT
Start: 2020-11-17 | End: 2021-02-18

## 2020-11-17 RX ORDER — TOPIRAMATE 50 MG/1
50 TABLET, FILM COATED ORAL 2 TIMES DAILY
Qty: 180 TABLET | Refills: 1 | Status: SHIPPED | OUTPATIENT
Start: 2020-11-17 | End: 2021-05-21

## 2020-11-17 RX ORDER — BETAMETHASONE SODIUM PHOSPHATE AND BETAMETHASONE ACETATE 3; 3 MG/ML; MG/ML
6 INJECTION, SUSPENSION INTRA-ARTICULAR; INTRALESIONAL; INTRAMUSCULAR; SOFT TISSUE
Status: DISCONTINUED | OUTPATIENT
Start: 2020-11-17 | End: 2023-12-14

## 2020-11-17 RX ORDER — ROPIVACAINE HYDROCHLORIDE 5 MG/ML
3 INJECTION, SOLUTION EPIDURAL; INFILTRATION; PERINEURAL
Status: DISCONTINUED | OUTPATIENT
Start: 2020-11-17 | End: 2023-12-14

## 2020-11-17 RX ADMIN — BETAMETHASONE SODIUM PHOSPHATE AND BETAMETHASONE ACETATE 6 MG: 3; 3 INJECTION, SUSPENSION INTRA-ARTICULAR; INTRALESIONAL; INTRAMUSCULAR; SOFT TISSUE at 15:20

## 2020-11-17 RX ADMIN — ROPIVACAINE HYDROCHLORIDE 3 ML: 5 INJECTION, SOLUTION EPIDURAL; INFILTRATION; PERINEURAL at 15:20
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
## 2020-11-17 NOTE — TELEPHONE ENCOUNTER
PRIOR AUTHORIZATION DENIED    Medication: phentermine (ADIPEX-P) 15 MG capsule    Denial Date: 11/17/2020    Denial Rational: Patient's plan only covers 3 months of medication per year. Patient may receive medication but would have to pay out of pocket or use coupon/discount card.      Appeal Information: If provider would like to appeal this decision we will need a detailed letter of medical necessity to start the process. Then re-route this request back to the PA pool.

## 2020-11-17 NOTE — TELEPHONE ENCOUNTER
Will have pt contacted via telephone regarding right shoulder MRI results.  Given findings plan to treat with follow-up in clinic for possible steroid injection given rotator cuff repair in isolation not possible given amount of glenohumeral arthritis.  The surgery would be likely a reverse total shoulder replacement that would not be a great option given how young the patient is.  Can relay MRI results and plan.      Tye Cam MD       N/A

## 2020-11-17 NOTE — TELEPHONE ENCOUNTER
Please help patient schedule a virtual visit, telephone or video, to discuss phentermine and weight

## 2020-11-17 NOTE — PATIENT INSTRUCTIONS
Diagnosis: Right shoulder pain rotator cuff tear vs glenohumeral arthritis  Image Findings: rotator cuff tear with possible frozen shoulder  Treatment: right glenohumeral steroid injection  Medications: Limited tylenol/ibuprofen for pain for 1-2 weeks  Follow-up: 2 weeks if not improved    Please call [phone]   Ask for my team if you have any questions or concerns    It was great seeing you again today!    Tye Cam MD, CAQSM        [facility] Injection Discharge Instructions    Procedure: Right shoulder steroid injection       You may shower, however avoid swimming, tub baths or hot tubs for 24 hours following your procedure    You may have a mild to moderate increase in pain for several days following the injection.    It may take up to 14 days for the steroid medication to start working although you may feel the effect as early as a few days after the procedure.    You may use ice packs for 10-15 minutes, 3 to 4 times a day at the injection site for comfort    You may use anti-inflammatory medications (such as Ibuprofen or Aleve or Advil) or Tylenol for pain control if necessary    If you were fasting, you may resume your normal diet and medications after the procedure    If you have diabetes, check your blood sugar more frequently than usual as your blood sugar may be higher than normal for 10-14 days following a steroid injection. Contact your doctor who manages your diabetes if your blood sugar is higher than usual      If you experience any of the following, call [facility] @ [phone] or [phone]  -Fever over 100 degree F  -Swelling, bleeding, redness, drainage, warmth at the injection site  - New or worsening pain

## 2020-11-17 NOTE — TELEPHONE ENCOUNTER
Called and spoke with patient. Relayed message and plan per Dr. Cam.  Patient would like to be scheduled ASAP.      She was scheduled this afternoon, 11/17/20.    Amna Mederos ATC

## 2020-12-08 ENCOUNTER — OFFICE VISIT (OUTPATIENT)
Dept: ORTHOPEDICS | Facility: CLINIC | Age: 45
End: 2020-12-08
Payer: COMMERCIAL

## 2020-12-08 VITALS — BODY MASS INDEX: 39.9 KG/M2 | HEIGHT: 67 IN | DIASTOLIC BLOOD PRESSURE: 90 MMHG | SYSTOLIC BLOOD PRESSURE: 135 MMHG

## 2020-12-08 DIAGNOSIS — M75.101 TEAR OF RIGHT ROTATOR CUFF, UNSPECIFIED TEAR EXTENT, UNSPECIFIED WHETHER TRAUMATIC: Primary | ICD-10-CM

## 2020-12-08 DIAGNOSIS — G89.29 CHRONIC RIGHT SHOULDER PAIN: ICD-10-CM

## 2020-12-08 DIAGNOSIS — M25.511 CHRONIC RIGHT SHOULDER PAIN: ICD-10-CM

## 2020-12-08 PROCEDURE — 20611 DRAIN/INJ JOINT/BURSA W/US: CPT | Mod: RT | Performed by: FAMILY MEDICINE

## 2020-12-08 PROCEDURE — 99213 OFFICE O/P EST LOW 20 MIN: CPT | Mod: 25 | Performed by: FAMILY MEDICINE

## 2020-12-08 RX ORDER — BETAMETHASONE SODIUM PHOSPHATE AND BETAMETHASONE ACETATE 3; 3 MG/ML; MG/ML
6 INJECTION, SUSPENSION INTRA-ARTICULAR; INTRALESIONAL; INTRAMUSCULAR; SOFT TISSUE
Status: DISCONTINUED | OUTPATIENT
Start: 2020-12-08 | End: 2023-12-14

## 2020-12-08 RX ORDER — ROPIVACAINE HYDROCHLORIDE 5 MG/ML
3 INJECTION, SOLUTION EPIDURAL; INFILTRATION; PERINEURAL
Status: DISCONTINUED | OUTPATIENT
Start: 2020-12-08 | End: 2023-12-14

## 2020-12-08 RX ADMIN — BETAMETHASONE SODIUM PHOSPHATE AND BETAMETHASONE ACETATE 6 MG: 3; 3 INJECTION, SUSPENSION INTRA-ARTICULAR; INTRALESIONAL; INTRAMUSCULAR; SOFT TISSUE at 13:31

## 2020-12-08 RX ADMIN — ROPIVACAINE HYDROCHLORIDE 3 ML: 5 INJECTION, SOLUTION EPIDURAL; INFILTRATION; PERINEURAL at 13:31

## 2020-12-08 NOTE — PATIENT INSTRUCTIONS
Jeanne to follow up with Primary Care provider regarding elevated blood pressure.    Diagnosis: Right Shoulder Rotator cuff pain  Image Findings: partial rotator cuff tear, mild shoulder arthritis   Treatment: subacromial steroid injection, continue home exercises  Job: no restrictions   Medications: Limited tylenol/ibuprofen for pain for 1-2 weeks  Follow-up: In one month if symptoms do not improve, sooner if worsening    Please call [phone]   Ask for my team if you have any questions or concerns    It was great seeing you today!    Tye Cam MD, [facility] Injection Discharge Instructions    Procedure: right subacromial steroid injection      You may shower, however avoid swimming, tub baths or hot tubs for 24 hours following your procedure    You may have a mild to moderate increase in pain for several days following the injection.    It may take up to 14 days for the steroid medication to start working although you may feel the effect as early as a few days after the procedure.    You may use ice packs for 10-15 minutes, 3 to 4 times a day at the injection site for comfort    You may use anti-inflammatory medications (such as Ibuprofen or Aleve or Advil) or Tylenol for pain control if necessary    If you were fasting, you may resume your normal diet and medications after the procedure    If you have diabetes, check your blood sugar more frequently than usual as your blood sugar may be higher than normal for 10-14 days following a steroid injection. Contact your doctor who manages your diabetes if your blood sugar is higher than usual      If you experience any of the following, call [facility] @ [phone] or [phone]  -Fever over 100 degree F  -Swelling, bleeding, redness, drainage, warmth at the injection site  - New or worsening pain         It was great seeing you again today!    Tye Cam
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
## 2021-01-14 ENCOUNTER — HEALTH MAINTENANCE LETTER (OUTPATIENT)
Age: 46
End: 2021-01-14

## 2021-02-12 DIAGNOSIS — E66.811 CLASS 1 OBESITY WITHOUT SERIOUS COMORBIDITY WITH BODY MASS INDEX (BMI) OF 33.0 TO 33.9 IN ADULT, UNSPECIFIED OBESITY TYPE: ICD-10-CM

## 2021-02-15 RX ORDER — PHENTERMINE HYDROCHLORIDE 15 MG/1
15 CAPSULE ORAL EVERY MORNING
Qty: 30 CAPSULE | Status: CANCELLED | OUTPATIENT
Start: 2021-02-15

## 2021-02-15 NOTE — TELEPHONE ENCOUNTER
Routing refill request to provider for review/approval because:  Drug not on the [facility] refill protocol   Last written 11/17/20 for #30 with 2 refills.     Bonnie Mckeon RN BSN  [facility]

## 2021-02-17 NOTE — TELEPHONE ENCOUNTER
The dentist doesn't take weights... have her schedule a weight on ancillary. I do want to know what they get for her blood pressure and pulse though

## 2021-02-17 NOTE — TELEPHONE ENCOUNTER
Pt is seeing dentist and will send over vitals from that visit.  Please let us know if this is okay

## 2021-02-18 ENCOUNTER — VIRTUAL VISIT (OUTPATIENT)
Dept: FAMILY MEDICINE | Facility: CLINIC | Age: 46
End: 2021-02-18
Payer: COMMERCIAL

## 2021-02-18 DIAGNOSIS — Z12.31 ENCOUNTER FOR SCREENING MAMMOGRAM FOR BREAST CANCER: ICD-10-CM

## 2021-02-18 DIAGNOSIS — G47.00 INSOMNIA, UNSPECIFIED TYPE: ICD-10-CM

## 2021-02-18 DIAGNOSIS — F41.9 ANXIETY: ICD-10-CM

## 2021-02-18 DIAGNOSIS — E66.811 CLASS 1 OBESITY WITH BODY MASS INDEX (BMI) OF 32.0 TO 32.9 IN ADULT, UNSPECIFIED OBESITY TYPE, UNSPECIFIED WHETHER SERIOUS COMORBIDITY PRESENT: Primary | ICD-10-CM

## 2021-02-18 PROCEDURE — 99214 OFFICE O/P EST MOD 30 MIN: CPT | Mod: 95 | Performed by: PHYSICIAN ASSISTANT

## 2021-02-18 RX ORDER — DIAZEPAM 5 MG
5 TABLET ORAL
Qty: 15 TABLET | Refills: 0 | Status: SHIPPED | OUTPATIENT
Start: 2021-02-18 | End: 2021-09-09

## 2021-02-18 RX ORDER — PHENTERMINE HYDROCHLORIDE 15 MG/1
15 CAPSULE ORAL EVERY MORNING
Qty: 30 CAPSULE | Refills: 2 | Status: SHIPPED | OUTPATIENT
Start: 2021-02-18 | End: 2021-05-27

## 2021-02-18 NOTE — PATIENT INSTRUCTIONS
Your weight loss goal over the next 3 months is 10 pounds.  Follow up with Anh in 3 months for a weight/med check.

## 2021-02-18 NOTE — PROGRESS NOTES
"Jeanne is a 45 year old who is being evaluated via a billable video visit.      How would you like to obtain your AVS? MyChart  If the video visit is dropped, the invitation should be resent by: Text to cell phone: [phone]  Will anyone else be joining your video visit? No      Video Start Time: 8:19am      Assessment & Plan     1. Class 1 obesity with body mass index (BMI) of 32.0 to 32.9 in adult, unspecified obesity type, unspecified whether serious comorbidity present    2. Insomnia, unspecified type    3. Anxiety    4. Encounter for screening mammogram for breast cancer        1) Continue phentermine and topiramate. She recently increased her topiramate to BID dosing, was taking once daily previously. Weight loss goal of 10 pounds over the next 3 months. Reiterated the importance of diet and regular exercise.    2,3) Valium renewed - discussed PRN use for sleep. Getting back into a regular exercise routine should help with sleep as well.              Return in about 3 months (around 5/18/2021) for a weight check with a medical assistant.    CYNDIE Collazo [facility] MEKHICHERRY Angel is a 45 year old who presents for the following health issues     HPI     Medication Followup of Phentermine (ADIPEX-P)15 MG     Taking Medication as prescribed: yes    Side Effects:  None    Medication Helping Symptoms:  yes   Weight from [facility] is 204 lb done 02/17/21, no current height taken  Mom passed away in December - Covid  Has been having trouble sleeping, Valium helps  Started [employer], has gym membership   is motivated to get his DM under better control      Review of Systems   Constitutional, and psych systems are negative, except as otherwise noted.      Objective    Vitals - Patient Reported  Weight (Patient Reported): 92.5 kg (204 lb)  Height (Patient Reported): 168.9 cm (5' 6.5\")  BMI (Based on Pt Reported Ht/Wt): 32.43      Vitals:  No vitals were " obtained today due to virtual visit.    Physical Exam   GENERAL: Healthy, alert and no distress  EYES: Eyes grossly normal to inspection.  No discharge or erythema, or obvious scleral/conjunctival abnormalities.  RESP: No audible wheeze, cough, or visible cyanosis.  No visible retractions or increased work of breathing.    SKIN: Visible skin clear. No significant rash, abnormal pigmentation or lesions.  NEURO: Cranial nerves grossly intact.  Mentation and speech appropriate for age.  PSYCH: Mentation appears normal, affect normal/bright, judgement and insight intact, normal speech and appearance well-groomed.          Video-Visit Details    Type of service:  Video Visit    Video End Time: 8:33am, 14 mins    Originating Location (pt. Location): Home    Distant Location (provider location):  [facility] [employer]     Platform used for Video Visit: Idylis

## 2021-02-19 ENCOUNTER — TELEPHONE (OUTPATIENT)
Dept: FAMILY MEDICINE | Facility: CLINIC | Age: 46
End: 2021-02-19

## 2021-02-19 NOTE — TELEPHONE ENCOUNTER
PRIOR AUTHORIZATION DENIED - COMPLETED VIA EPA     Medication: phentermine (ADIPEX-P) 15 MG capsule - DENIED     Denial Date:  02/19/2021    Denial Rational: You do not meet the requirements of your plan. Your plan approved Phentermine/Phendimetrazine/Didrex/Diethylpropion criteria covers this drug when you have not received 3 months of therapy with the requested drug within the past year. Your request has been denied based on the information we have.        Appeal Information: Please write a letter of medical necessity

## 2021-03-18 ENCOUNTER — OFFICE VISIT (OUTPATIENT)
Dept: ORTHOPEDICS | Facility: CLINIC | Age: 46
End: 2021-03-18
Payer: COMMERCIAL

## 2021-03-18 VITALS — HEIGHT: 67 IN | BODY MASS INDEX: 39.9 KG/M2 | SYSTOLIC BLOOD PRESSURE: 118 MMHG | DIASTOLIC BLOOD PRESSURE: 76 MMHG

## 2021-03-18 DIAGNOSIS — M75.101 TEAR OF RIGHT ROTATOR CUFF, UNSPECIFIED TEAR EXTENT, UNSPECIFIED WHETHER TRAUMATIC: Primary | ICD-10-CM

## 2021-03-18 PROCEDURE — 99213 OFFICE O/P EST LOW 20 MIN: CPT | Mod: 25 | Performed by: FAMILY MEDICINE

## 2021-03-18 PROCEDURE — 20611 DRAIN/INJ JOINT/BURSA W/US: CPT | Mod: RT | Performed by: FAMILY MEDICINE

## 2021-03-18 RX ADMIN — ROPIVACAINE HYDROCHLORIDE 3 ML: 5 INJECTION, SOLUTION EPIDURAL; INFILTRATION; PERINEURAL at 15:34

## 2021-03-18 RX ADMIN — BETAMETHASONE SODIUM PHOSPHATE AND BETAMETHASONE ACETATE 6 MG: 3; 3 INJECTION, SUSPENSION INTRA-ARTICULAR; INTRALESIONAL; INTRAMUSCULAR; SOFT TISSUE at 15:34
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
## 2021-03-18 NOTE — PATIENT INSTRUCTIONS
[facility] Injection Discharge Instructions    Procedure: Right Subacromial Steroid Injection    Continue home exercises, follow-up if pain is worsening      You may shower, however avoid swimming, tub baths or hot tubs for 24 hours following your procedure    You may have a mild to moderate increase in pain for several days following the injection.    It may take up to 14 days for the steroid medication to start working although you may feel the effect as early as a few days after the procedure.    You may use ice packs for 10-15 minutes, 3 to 4 times a day at the injection site for comfort    You may use anti-inflammatory medications (such as Ibuprofen or Aleve or Advil) or Tylenol for pain control if necessary    If you were fasting, you may resume your normal diet and medications after the procedure        If you experience any of the following, call [facility] @ [phone] or [phone]  -Fever over 100 degree F  -Swelling, bleeding, redness, drainage, warmth at the injection site  - New or worsening pain
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
## 2021-03-19 ENCOUNTER — TELEPHONE (OUTPATIENT)
Dept: URGENT CARE | Facility: URGENT CARE | Age: 46
End: 2021-03-19

## 2021-03-19 DIAGNOSIS — G89.29 CHRONIC RIGHT SHOULDER PAIN: Primary | ICD-10-CM

## 2021-03-19 DIAGNOSIS — M25.511 CHRONIC RIGHT SHOULDER PAIN: Primary | ICD-10-CM

## 2021-03-19 DIAGNOSIS — M75.121 COMPLETE TEAR OF RIGHT ROTATOR CUFF, UNSPECIFIED WHETHER TRAUMATIC: ICD-10-CM

## 2021-03-19 NOTE — TELEPHONE ENCOUNTER
Patient LVM requesting a call back to discuss a doctors note and details of the note.  # [phone]

## 2021-03-19 NOTE — TELEPHONE ENCOUNTER
Called and spoke with patient.     Her  may be placed on an assignment that would be away from home for an extended period of time.      She is requesting a letter stating that with her shoulder condition, physical limitations and possible upcoming surgery he is her primary support and caregiver.    I explained that I would pass this message along to Dr. Cam for his recommendations.  She expressed understanding.     Amna Mederos, ATC

## 2021-03-19 NOTE — TELEPHONE ENCOUNTER
Pt contacted regarding letter for assistance at home from her  due to chronic shoulder pain due to rotator cuff tear.  Letter written today.  Follow-up as previously consulted.    Tye Cam MD

## 2021-03-19 NOTE — LETTER
[facility] [address] [address]          March 19, 2021    RE:  Jeanne Wasserman                                                                                                                                                       [address] [place] [id number]      To whom it may concern:    Jeanne Wasserman is under my professional care for    Chronic right shoulder pain  Complete tear of right rotator cuff, unspecified whether traumatic     This patient has a significant shoulder injury that is being treated conservatively.  While her condition is improving she may benefit from assistance at home particularly from her .        Sincerely,      Tey Cam MD

## 2021-03-22 RX ORDER — BETAMETHASONE SODIUM PHOSPHATE AND BETAMETHASONE ACETATE 3; 3 MG/ML; MG/ML
6 INJECTION, SUSPENSION INTRA-ARTICULAR; INTRALESIONAL; INTRAMUSCULAR; SOFT TISSUE
Status: DISCONTINUED | OUTPATIENT
Start: 2021-03-18 | End: 2023-12-14

## 2021-03-22 RX ORDER — ROPIVACAINE HYDROCHLORIDE 5 MG/ML
3 INJECTION, SOLUTION EPIDURAL; INFILTRATION; PERINEURAL
Status: DISCONTINUED | OUTPATIENT
Start: 2021-03-18 | End: 2023-12-14

## 2021-04-12 ENCOUNTER — MYC MEDICAL ADVICE (OUTPATIENT)
Dept: ORTHOPEDICS | Facility: CLINIC | Age: 46
End: 2021-04-12

## 2021-04-12 NOTE — TELEPHONE ENCOUNTER
Replied to pt via MyChart follow-up for possible right glenohumeral steroid injection    Tye Cam MD

## 2021-04-22 ENCOUNTER — OFFICE VISIT (OUTPATIENT)
Dept: ORTHOPEDICS | Facility: CLINIC | Age: 46
End: 2021-04-22
Payer: COMMERCIAL

## 2021-04-22 DIAGNOSIS — M19.011 GLENOHUMERAL ARTHRITIS, RIGHT: Primary | ICD-10-CM

## 2021-04-22 PROCEDURE — 99207 PR NO CHARGE LOS: CPT | Performed by: FAMILY MEDICINE

## 2021-04-22 RX ORDER — METHOCARBAMOL 500 MG/1
1000 TABLET, FILM COATED ORAL
Qty: 30 TABLET | Refills: 0 | Status: SHIPPED | OUTPATIENT
Start: 2021-04-22 | End: 2021-09-09

## 2021-04-22 NOTE — PROGRESS NOTES
Large Joint Injection/Arthocentesis: R glenohumeral joint    Date/Time: 4/22/2021 4:50 PM  Performed by: Tye Cam MD  Authorized by: Tye Cam MD     Indications:  Pain and osteoarthritis  Needle Size:  25 G  Guidance: ultrasound    Approach:  Posterolateral  Location:  Shoulder      Site:  R glenohumeral joint  Medications:  6 mg betamethasone acet & sod phos 6 (3-3) MG/ML; 3 mL ropivacaine 5 MG/ML  Medications comment:  Actual amount of ropivacaine used 4 mL  Outcome:  Tolerated well, no immediate complications  Procedure discussed: discussed risks, benefits, and alternatives    Consent Given by:  Patient  Timeout: timeout called immediately prior to procedure    Prep: patient was prepped and draped in usual sterile fashion

## 2021-04-22 NOTE — LETTER
4/22/2021         RE: Jeanne Wasserman  [address] [facility] [place] [address]        Dear Colleague,    Thank you for referring your patient, Jeanne Wasserman, to the [facility]. Please see a copy of my visit note below.    Pt has second Covid vaccine today will delay steroid injection until 2 weeks from 2nd inoculation.  No charge today.    Tye Cam MD         Again, thank you for allowing me to participate in the care of your patient.        Sincerely,        Tye Cam MD

## 2021-04-26 NOTE — PROGRESS NOTES
Pt has second Covid vaccine today will delay steroid injection until 2 weeks from 2nd inoculation.  No charge today.    Tye Cam MD

## 2021-05-13 ENCOUNTER — OFFICE VISIT (OUTPATIENT)
Dept: ORTHOPEDICS | Facility: CLINIC | Age: 46
End: 2021-05-13
Payer: COMMERCIAL

## 2021-05-13 DIAGNOSIS — M19.011 GLENOHUMERAL ARTHRITIS, RIGHT: Primary | ICD-10-CM

## 2021-05-13 PROCEDURE — 20611 DRAIN/INJ JOINT/BURSA W/US: CPT | Mod: RT | Performed by: FAMILY MEDICINE

## 2021-05-13 RX ADMIN — ROPIVACAINE HYDROCHLORIDE 3 ML: 5 INJECTION, SOLUTION EPIDURAL; INFILTRATION; PERINEURAL at 16:21

## 2021-05-13 RX ADMIN — BETAMETHASONE SODIUM PHOSPHATE AND BETAMETHASONE ACETATE 6 MG: 3; 3 INJECTION, SUSPENSION INTRA-ARTICULAR; INTRALESIONAL; INTRAMUSCULAR; SOFT TISSUE at 16:21

## 2021-05-13 NOTE — LETTER
5/13/2021         RE: Jeanne Wasserman  [address]  [facility] [address]        Dear Colleague,    Thank you for referring your patient, Jeanne Wasserman, to the [facility]. Please see a copy of my visit note below.    Large Joint Injection/Arthocentesis: R glenohumeral joint    Date/Time: 5/13/2021 4:21 PM  Performed by: Tye Cam MD  Authorized by: Tye Cam MD     Indications:  Pain and osteoarthritis  Needle Size:  25 G  Guidance: ultrasound    Approach:  Posterolateral  Location:  Shoulder      Site:  R glenohumeral joint  Medications:  6 mg betamethasone acet & sod phos 6 (3-3) MG/ML; 3 mL ropivacaine 5 MG/ML  Medications comment:  Actual amount of ropivacaine used 4mL  Outcome:  Tolerated well, no immediate complications  Procedure discussed: discussed risks, benefits, and alternatives    Consent Given by:  Patient  Timeout: timeout called immediately prior to procedure    Prep: patient was prepped and draped in usual sterile fashion     Patient reported significant improvement of pain after right glenohumeral steroid injection.  Aftercare instructions given to patient.  Plan to follow-up as needed.      Tye Cam MD [facility]                Again, thank you for allowing me to participate in the care of your patient.        Sincerely,        Tye Cam MD

## 2021-05-13 NOTE — PROGRESS NOTES
Large Joint Injection/Arthocentesis: R glenohumeral joint    Date/Time: 5/13/2021 4:21 PM  Performed by: Tye Cam MD  Authorized by: Tye Cam MD     Indications:  Pain and osteoarthritis  Needle Size:  25 G  Guidance: ultrasound    Approach:  Posterolateral  Location:  Shoulder      Site:  R glenohumeral joint  Medications:  6 mg betamethasone acet & sod phos 6 (3-3) MG/ML; 3 mL ropivacaine 5 MG/ML  Medications comment:  Actual amount of ropivacaine used 4mL  Outcome:  Tolerated well, no immediate complications  Procedure discussed: discussed risks, benefits, and alternatives    Consent Given by:  Patient  Timeout: timeout called immediately prior to procedure    Prep: patient was prepped and draped in usual sterile fashion     Patient reported significant improvement of pain after right glenohumeral steroid injection.  Aftercare instructions given to patient.  Plan to follow-up as needed.      Tye Cam MD [facility]

## 2021-05-13 NOTE — PATIENT INSTRUCTIONS
[facility] Injection Discharge Instructions    Procedure: Right glenohumeral steroid injection      You may shower, however avoid swimming, tub baths or hot tubs for 24 hours following your procedure    You may have a mild to moderate increase in pain for several days following the injection.    It may take up to 14 days for the steroid medication to start working although you may feel the effect as early as a few days after the procedure.    You may use ice packs for 10-15 minutes, 3 to 4 times a day at the injection site for comfort    You may use anti-inflammatory medications (such as Ibuprofen or Aleve or Advil) or Tylenol for pain control if necessary    If you were fasting, you may resume your normal diet and medications after the procedure    If you have diabetes, check your blood sugar more frequently than usual as your blood sugar may be higher than normal for 10-14 days following a steroid injection. Contact your doctor who manages your diabetes if your blood sugar is higher than usual      If you experience any of the following, call [facility] @ [phone] or [phone]  -Fever over 100 degree F  -Swelling, bleeding, redness, drainage, warmth at the injection site  - New or worsening pain    It was great seeing you again today!    Tye Cam

## 2021-05-16 RX ORDER — BETAMETHASONE SODIUM PHOSPHATE AND BETAMETHASONE ACETATE 3; 3 MG/ML; MG/ML
6 INJECTION, SUSPENSION INTRA-ARTICULAR; INTRALESIONAL; INTRAMUSCULAR; SOFT TISSUE
Status: DISCONTINUED | OUTPATIENT
Start: 2021-05-13 | End: 2023-12-14

## 2021-05-16 RX ORDER — ROPIVACAINE HYDROCHLORIDE 5 MG/ML
3 INJECTION, SOLUTION EPIDURAL; INFILTRATION; PERINEURAL
Status: DISCONTINUED | OUTPATIENT
Start: 2021-05-13 | End: 2023-12-14

## 2021-05-27 ENCOUNTER — VIRTUAL VISIT (OUTPATIENT)
Dept: FAMILY MEDICINE | Facility: CLINIC | Age: 46
End: 2021-05-27
Payer: COMMERCIAL

## 2021-05-27 DIAGNOSIS — M54.12 CERVICAL RADICULOPATHY: ICD-10-CM

## 2021-05-27 DIAGNOSIS — E66.811 CLASS 1 OBESITY WITH BODY MASS INDEX (BMI) OF 32.0 TO 32.9 IN ADULT, UNSPECIFIED OBESITY TYPE, UNSPECIFIED WHETHER SERIOUS COMORBIDITY PRESENT: ICD-10-CM

## 2021-05-27 DIAGNOSIS — M54.2 NECK PAIN: ICD-10-CM

## 2021-05-27 PROCEDURE — 99213 OFFICE O/P EST LOW 20 MIN: CPT | Mod: 95 | Performed by: PHYSICIAN ASSISTANT

## 2021-05-27 RX ORDER — TOPIRAMATE 50 MG/1
50 TABLET, FILM COATED ORAL 2 TIMES DAILY
Qty: 180 TABLET | Refills: 3 | Status: SHIPPED | OUTPATIENT
Start: 2021-05-27 | End: 2022-07-08

## 2021-05-27 RX ORDER — PHENTERMINE HYDROCHLORIDE 15 MG/1
15 CAPSULE ORAL EVERY MORNING
Qty: 30 CAPSULE | Refills: 2 | Status: SHIPPED | OUTPATIENT
Start: 2021-05-27 | End: 2021-09-09

## 2021-05-27 NOTE — PROGRESS NOTES
Jeanne is a 46 year old who is being evaluated via a billable video visit.      How would you like to obtain your AVS? MyChart  If the video visit is dropped, the invitation should be resent by: Text to cell phone: [phone]  Will anyone else be joining your video visit? No      Video Start Time: 11:04am    Assessment & Plan     1. Cervical radiculopathy    2. Neck pain    3. Class 1 obesity with body mass index (BMI) of 32.0 to 32.9 in adult, unspecified obesity type, unspecified whether serious comorbidity present        1,2) Topiramate renewed, no changes.    3) patient did not meet her 10 pound weight loss goal. She was given a new goal of 9 pounds over the next 3 months. If she doesn't meet this goal we'll go ahead and stop phentermine. May trial Wellbutrin off label. I also encouraged her to get back into a regular exercise routine.     Prescription drug management           Return in about 3 months (around 8/27/2021) for a weight check, a virtual visit (tele/video).    Anh Cortez PA-C  [facility] Angel is a 46 year old who presents for the following health issues     HPI     Medication Followup of phentermine 15 mg   morbid obesity    Taking Medication as prescribed: yes    Side Effects:  None    Medication Helping Symptoms:  yes     Chronic Pain Follow-Up    Where in your body do you have pain? neck  How has your pain affected your ability to work? 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 Steroid Injections  How well are you sleeping? Fair  How has your mood been since your last visit? About the same  Have you had a significant life event? No  Other aggravating factors: repetitive activities - physical  Taking medication as directed? Yes    PHQ-9 SCORE 2/12/2018 1/21/2019 1/31/2020   PHQ-9 Total Score [facility] 7 (Mild depression) -  "-   PHQ-9 Total Score 7 9 0     MARY-7 SCORE 12/10/2015 1/21/2019   Total Score 11 0     No flowsheet data found.  Encounter-Level CSA:    There are no encounter-level csa.     Patient-Level CSA:    There are no patient-level csa.           Review of Systems   Constitutional systems are negative, except as otherwise noted.      Objective    Vitals - Patient Reported  Weight (Patient Reported): 89.8 kg (198 lb)  Height (Patient Reported): 168.9 cm (5' 6.5\")  BMI (Based on Pt Reported Ht/Wt): 31.48      Vitals:  No vitals were obtained today due to virtual visit.    Physical Exam   GENERAL: Healthy, alert and no distress  EYES: Eyes grossly normal to inspection.  No discharge or erythema, or obvious scleral/conjunctival abnormalities.  RESP: No audible wheeze, cough, or visible cyanosis.  No visible retractions or increased work of breathing.    SKIN: Visible skin clear. No significant rash, abnormal pigmentation or lesions.  NEURO: Cranial nerves grossly intact.  Mentation and speech appropriate for age.  PSYCH: Mentation appears normal, affect normal/bright, judgement and insight intact, normal speech and appearance well-groomed.          Video-Visit Details    Type of service:  Video Visit    Video End Time: 11:13am, 9 mins    Originating Location (pt. Location): Home    Distant Location (provider location):  [facility]     Platform used for Video Visit: Angélica  "

## 2021-05-27 NOTE — PATIENT INSTRUCTIONS
Your weight loss goal over the next 3 months is 9 pounds.  Follow up with Anh in 3 months for a weight/med check.

## 2021-06-21 DIAGNOSIS — M54.2 NECK PAIN: ICD-10-CM

## 2021-06-21 DIAGNOSIS — M54.12 CERVICAL RADICULOPATHY: ICD-10-CM

## 2021-06-22 RX ORDER — TOPIRAMATE 50 MG/1
TABLET, FILM COATED ORAL
Qty: 60 TABLET | OUTPATIENT
Start: 2021-06-22

## 2021-07-03 NOTE — PATIENT INSTRUCTIONS
If you have any questions regarding your visit, Please contact your care team.    [employer]: [phone]      [facility] CLINIC HOURS TELEPHONE NUMBER   Cephas Agbeh, M.D.    TANIYA Angel,     STANISLAV Kuo, STANISLAV             Monday-Gian    8:00a.m-4:45 p.m    Tuesday--[place]     8:00a.m-4:45 p.m.    Thursday-[place]ian    8:00a.m-4:45 p.m.    Friday-[place]    8:00a.m-4:45 p.m    [facility]   [address]   [place] [address] [phone]-Ask for [facility]   Fax [phone]   Scbuzhg-[phone]     [facility] Labor [facility]   [address] [place] [address] [address]   [place], [place] [address] [phone]     [facility]   [address] [facility] [address] [phone]   Qsvoxqb-[phone]    Urgent Care locations:    [facility]  Monday-Friday   5 pm - 9 pm   Saturday and Sunday    9 am - 5 pm      Monday-Friday    11 pm - 9 pm  Saturday and Sunday   9 am - 5 pm    [phone]     If you need a medication refill, please contact your pharmacy. Please allow 3 business days for your refill to be completed.  As always, Thank you for trusting us with your healthcare needs!    
Strong peripheral pulses/Capillary refill less/equal to 2 seconds
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
## 2021-08-19 ENCOUNTER — OFFICE VISIT (OUTPATIENT)
Dept: ORTHOPEDICS | Facility: CLINIC | Age: 46
End: 2021-08-19
Payer: COMMERCIAL

## 2021-08-19 VITALS
HEIGHT: 67 IN | WEIGHT: 251 LBS | BODY MASS INDEX: 39.39 KG/M2 | DIASTOLIC BLOOD PRESSURE: 88 MMHG | SYSTOLIC BLOOD PRESSURE: 118 MMHG

## 2021-08-19 DIAGNOSIS — M19.011 GLENOHUMERAL ARTHRITIS, RIGHT: Primary | ICD-10-CM

## 2021-08-19 PROCEDURE — 20611 DRAIN/INJ JOINT/BURSA W/US: CPT | Mod: RT | Performed by: FAMILY MEDICINE

## 2021-08-19 PROCEDURE — 99213 OFFICE O/P EST LOW 20 MIN: CPT | Mod: 25 | Performed by: FAMILY MEDICINE

## 2021-08-19 RX ORDER — ROPIVACAINE HYDROCHLORIDE 5 MG/ML
0.5 INJECTION, SOLUTION EPIDURAL; INFILTRATION; PERINEURAL
Status: DISCONTINUED | OUTPATIENT
Start: 2021-08-19 | End: 2023-12-14

## 2021-08-19 RX ORDER — BETAMETHASONE SODIUM PHOSPHATE AND BETAMETHASONE ACETATE 3; 3 MG/ML; MG/ML
6 INJECTION, SUSPENSION INTRA-ARTICULAR; INTRALESIONAL; INTRAMUSCULAR; SOFT TISSUE
Status: DISCONTINUED | OUTPATIENT
Start: 2021-08-19 | End: 2023-12-14

## 2021-08-19 RX ADMIN — BETAMETHASONE SODIUM PHOSPHATE AND BETAMETHASONE ACETATE 6 MG: 3; 3 INJECTION, SUSPENSION INTRA-ARTICULAR; INTRALESIONAL; INTRAMUSCULAR; SOFT TISSUE at 15:59

## 2021-08-19 RX ADMIN — ROPIVACAINE HYDROCHLORIDE 0.5 ML: 5 INJECTION, SOLUTION EPIDURAL; INFILTRATION; PERINEURAL at 15:59

## 2021-08-19 ASSESSMENT — PAIN SCALES - GENERAL: PAINLEVEL: SEVERE PAIN (7)

## 2021-08-19 ASSESSMENT — MIFFLIN-ST. JEOR: SCORE: 1816.16

## 2021-08-19 NOTE — PATIENT INSTRUCTIONS
# Chronic Right Shoulder Pain: Patient has had recurrence over symptoms since 12/8/2020 with pain with range of motion.  Previous steroid injection on 4/22/2021 in the subacromial region provided no significant relief.  Glenohumeral steroid injection on 5/13/2021 provided significant relief up until over the past month.  Pain limiting ability to move her shoulder and hold things.  Counseled patient on treatment options including referral to surgery, continue physical therapy, home exercises, repeat steroid injections.  Given previously injection helped her symptoms significantly plan to treat as below and follow-up as needed  Treatment: Activities as tolerated   Physical Therapy continue home exercises  Injection repeat right glenohumeral steroid  Medications Limited tylenol/ibuprofen for pain for 1-2 weeks    It was great seeing you again today!    Tye Cam    [facility] Injection Discharge Instructions    Procedure: right glenohumeral steroid       You may shower, however avoid swimming, tub baths or hot tubs for 24 hours following your procedure    You may have a mild to moderate increase in pain for several days following the injection.    It may take up to 14 days for the steroid medication to start working although you may feel the effect as early as a few days after the procedure.    You may use ice packs for 10-15 minutes, 3 to 4 times a day at the injection site for comfort    You may use anti-inflammatory medications (such as Ibuprofen or Aleve or Advil) or Tylenol for pain control if necessary    If you were fasting, you may resume your normal diet and medications after the procedure    If you have diabetes, check your blood sugar more frequently than usual as your blood sugar may be higher than normal for 10-14 days following a steroid injection. Contact your doctor who manages your diabetes if your blood sugar is higher than usual      If you experience any of the following, call [facility] @ [phone]  or [phone]  -Fever over 100 degree F  -Swelling, bleeding, redness, drainage, warmth at the injection site  - New or worsening pain
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
## 2021-09-01 ENCOUNTER — TELEPHONE (OUTPATIENT)
Dept: URGENT CARE | Facility: URGENT CARE | Age: 46
End: 2021-09-01

## 2021-09-01 NOTE — TELEPHONE ENCOUNTER
[facility] Call Center    Phone Message    May a detailed message be left on voicemail: yes     Reason for Call: Other: Pt had R shoulder injection on 8/19, and it has not helped at all. Pt would like help with pain management.      Action Taken: Other: [facility] Triage Pool    Travel Screening: Not Applicable

## 2021-09-02 NOTE — TELEPHONE ENCOUNTER
Contacted pt regarding shoulder pain following right glenohumeral steroid injection.  She got some impr

## 2021-09-02 NOTE — TELEPHONE ENCOUNTER
Dr. Wilkerson called and spoke with patient. Scheduled to be worked in next Tuesday 97/21.    Amna Mederos, ATC

## 2021-09-07 ENCOUNTER — OFFICE VISIT (OUTPATIENT)
Dept: ORTHOPEDICS | Facility: CLINIC | Age: 46
End: 2021-09-07
Payer: COMMERCIAL

## 2021-09-07 DIAGNOSIS — M25.511 CHRONIC RIGHT SHOULDER PAIN: Primary | ICD-10-CM

## 2021-09-07 DIAGNOSIS — G89.29 CHRONIC RIGHT SHOULDER PAIN: Primary | ICD-10-CM

## 2021-09-07 PROCEDURE — 20611 DRAIN/INJ JOINT/BURSA W/US: CPT | Mod: RT | Performed by: FAMILY MEDICINE

## 2021-09-07 RX ORDER — ROPIVACAINE HYDROCHLORIDE 5 MG/ML
3 INJECTION, SOLUTION EPIDURAL; INFILTRATION; PERINEURAL
Status: DISCONTINUED | OUTPATIENT
Start: 2021-09-07 | End: 2023-12-14

## 2021-09-07 RX ADMIN — ROPIVACAINE HYDROCHLORIDE 3 ML: 5 INJECTION, SOLUTION EPIDURAL; INFILTRATION; PERINEURAL at 14:44

## 2021-09-07 NOTE — PATIENT INSTRUCTIONS
[facility] Injection Discharge Instructions    Procedure: right subacromial steroid injection       You may shower, however avoid swimming, tub baths or hot tubs for 24 hours following your procedure    You may have a mild to moderate increase in pain for several days following the injection.    It may take up to 14 days for the steroid medication to start working although you may feel the effect as early as a few days after the procedure.    You may use ice packs for 10-15 minutes, 3 to 4 times a day at the injection site for comfort    You may use anti-inflammatory medications (such as Ibuprofen or Aleve or Advil) or Tylenol for pain control if necessary    If you were fasting, you may resume your normal diet and medications after the procedure    If you have diabetes, check your blood sugar more frequently than usual as your blood sugar may be higher than normal for 10-14 days following a steroid injection. Contact your doctor who manages your diabetes if your blood sugar is higher than usual      If you experience any of the following, call [facility] @ [phone] or [phone]  -Fever over 100 degree F  -Swelling, bleeding, redness, drainage, warmth at the injection site  - New or worsening pain

## 2021-09-07 NOTE — PROGRESS NOTES
Large Joint Injection/Arthocentesis: R subacromial bursa    Date/Time: 9/7/2021 2:44 PM  Performed by: Tye Cam MD  Authorized by: Tye Cam MD     Indications:  Pain  Needle Size:  25 G  Guidance: ultrasound    Location:  Shoulder      Site:  R subacromial bursa  Medications:  3 mL ropivacaine 5 MG/ML  Medications comment:  4mL Ropivacaine used           1mL Betamethasone    NDC: 9039-1544-83   LOT [id number]   EXP 09/30/22   Outcome:  Tolerated well, no immediate complications  Procedure discussed: discussed risks, benefits, and alternatives    Consent Given by:  Patient  Timeout: timeout called immediately prior to procedure    Prep: patient was prepped and draped in usual sterile fashion     Patient reported significant improvement of pain after right subacromial steroid injection.  Aftercare instructions given to patient.  Plan to follow-up as previously discussed with referring provider.     Tye Cam MD [facility]

## 2021-09-07 NOTE — LETTER
9/7/2021         RE: Jeanne Wasserman  [address]  [facility] [address]        Dear Colleague,    Thank you for referring your patient, Jeanne Wasserman, to the [facility]. Please see a copy of my visit note below.    Large Joint Injection/Arthocentesis: R subacromial bursa    Date/Time: 9/7/2021 2:44 PM  Performed by: Tye Cam MD  Authorized by: Tye Cam MD     Indications:  Pain  Needle Size:  25 G  Guidance: ultrasound    Location:  Shoulder      Site:  R subacromial bursa  Medications:  3 mL ropivacaine 5 MG/ML  Medications comment:  4mL Ropivacaine used           1mL Betamethasone    NDC: 1189-3208-20   LOT [id number]   EXP 09/30/22   Outcome:  Tolerated well, no immediate complications  Procedure discussed: discussed risks, benefits, and alternatives    Consent Given by:  Patient  Timeout: timeout called immediately prior to procedure    Prep: patient was prepped and draped in usual sterile fashion     Patient reported significant improvement of pain after right subacromial steroid injection.  Aftercare instructions given to patient.  Plan to follow-up as previously discussed with referring provider.     Tye Cam MD [facility]                Again, thank you for allowing me to participate in the care of your patient.        Sincerely,        Tye Cam MD

## 2021-09-09 ENCOUNTER — VIRTUAL VISIT (OUTPATIENT)
Dept: FAMILY MEDICINE | Facility: CLINIC | Age: 46
End: 2021-09-09
Payer: COMMERCIAL

## 2021-09-09 DIAGNOSIS — E66.9 OBESITY (BMI 30-39.9): ICD-10-CM

## 2021-09-09 PROBLEM — F33.8 SEASONAL AFFECTIVE DISORDER (H): Status: RESOLVED | Noted: 2018-02-13 | Resolved: 2021-09-09

## 2021-09-09 PROBLEM — E66.01 MORBID OBESITY (H): Status: RESOLVED | Noted: 2019-10-10 | Resolved: 2021-09-09

## 2021-09-09 PROCEDURE — 99212 OFFICE O/P EST SF 10 MIN: CPT | Mod: TEL | Performed by: PHYSICIAN ASSISTANT

## 2021-09-09 RX ORDER — PHENTERMINE HYDROCHLORIDE 15 MG/1
15 CAPSULE ORAL EVERY MORNING
Qty: 30 CAPSULE | Refills: 2 | Status: SHIPPED | OUTPATIENT
Start: 2021-09-09 | End: 2022-07-25

## 2021-09-09 NOTE — PROGRESS NOTES
"Jeanne is a 46 year old who is being evaluated via a billable video visit.      How would you like to obtain your AVS? MyChart  If the video visit is dropped, the invitation should be resent by: Text to cell phone: [phone]  Will anyone else be joining your video visit? No      Video Start Time: converted to telephone visit, D[employer] not connecting    Assessment & Plan     1. Obesity (BMI 30-39.9)        Phentermine renewed x3 months. Weight loss goal of 9 pounds given. Follow up OV in 3 months to reassess.      Prescription drug management         BMI:   Estimated body mass index is 39.31 kg/m  as calculated from the following:    Height as of 8/19/21: 1.702 m (5' 7\").    Weight as of 8/19/21: 113.9 kg (251 lb).       Return in about 3 months (around 12/9/2021) for a weight check, OV.    Anh Cortez PA-C  [facility]      Charlotte Angel is a 46 year old who presents for the following health issues     HPI     Medication Followup of phentermine (Adipex-p) 15mg  BMI=30.23  Class 1 obesity with body mass index (BMI) of 32.0 to 32.9 in adult, unspecified obesity type, unspecified whether serious comorbidity present       Taking Medication as prescribed: yes    Side Effects:  None    Medication Helping Symptoms:  Yes    Given a weight loss goal of 9 pounds at last visit  Has lost 5 pounds  Twice now has not met weight loss goal  During July she travelled quite a bit, was road tripping, had stopped the phentermine for a couple weeks         Review of Systems   Constitutional, endocrine systems are negative, except as otherwise noted.      Objective    Vitals - Patient Reported  Weight (Patient Reported): 87.5 kg (193 lb)  Height (Patient Reported): 170.2 cm (5' 7\")  BMI (Based on Pt Reported Ht/Wt): 30.23      Vitals:  No vitals were obtained today due to virtual visit.    Physical Exam   GENERAL: Healthy, alert and no distress  RESP: No audible wheeze, cough  PSYCH: Mentation " appears normal, affect normal/bright, judgement and insight intact, normal speech           Video-Visit Details    Type of service:  Telephone visit, 12 mins

## 2021-09-10 ENCOUNTER — TELEPHONE (OUTPATIENT)
Dept: FAMILY MEDICINE | Facility: CLINIC | Age: 46
End: 2021-09-10

## 2021-09-10 NOTE — TELEPHONE ENCOUNTER
Prior Authorization Retail Medication Request    Medication/Dose: phentermine (ADIPEX-P) 15 MG capsule  ICD code (if different than what is on RX):  phentermine (ADIPEX-P) 15 MG capsule  Previously Tried and Failed:    Rationale:  Insurance does not cover    Insurance Name:  Preferred one   Insurance ID:  [id number]      Pharmacy Information (if different than what is on RX)  Name:  [facility]  Phone:  [phone]

## 2021-09-13 NOTE — TELEPHONE ENCOUNTER
PRIOR AUTHORIZATION DENIED    Medication: phentermine (ADIPEX-P) 15 MG capsule-DENIED    Denial Date: 9/13/2021    Denial Rational: You do not meet the requirements of your plan. Your plan approved Phentermine/Phendimetrazine/Didrex/Diethylpropion criteria covers this drug when you have not received 3 months of therapy with the requested drug within the past year. Your request has been denied based on the information we have    Appeal Information:

## 2021-09-13 NOTE — TELEPHONE ENCOUNTER
Central Prior Authorization Team   Phone: [phone]      PA Initiation    Medication: phentermine (ADIPEX-P) 15 MG capsule-Initiated  Insurance Company: [employer] - Phone [phone] Fax [phone]  Pharmacy Filling the Rx: [id number] - [place], [place] - [address] AT [facility]  Filling Pharmacy Phone: [phone]  Filling Pharmacy Fax:    Start Date: 9/13/2021

## 2021-10-24 ENCOUNTER — HEALTH MAINTENANCE LETTER (OUTPATIENT)
Age: 46
End: 2021-10-24

## 2021-10-25 DIAGNOSIS — M19.011 GLENOHUMERAL ARTHRITIS, RIGHT: Primary | ICD-10-CM

## 2021-10-25 NOTE — PROGRESS NOTES
Patient message via [employer] regarding appointment request.  She has had recent steroid injections glenohumeral and 8/19/2021 and subacromial 9/7/2021.  Uncertain which 1 provided significant relief.  Given this we will place preauthorization for Synvisc for shoulder arthritis and wait for reply for amount of pain relief she received from previous injections.    Tye Cam MD

## 2022-02-09 ENCOUNTER — VIRTUAL VISIT (OUTPATIENT)
Dept: FAMILY MEDICINE | Facility: CLINIC | Age: 47
End: 2022-02-09
Payer: COMMERCIAL

## 2022-02-09 DIAGNOSIS — F33.8 SEASONAL AFFECTIVE DISORDER (H): ICD-10-CM

## 2022-02-09 DIAGNOSIS — E66.9 OBESITY (BMI 30-39.9): Primary | ICD-10-CM

## 2022-02-09 PROCEDURE — 99214 OFFICE O/P EST MOD 30 MIN: CPT | Mod: TEL | Performed by: PHYSICIAN ASSISTANT

## 2022-02-09 RX ORDER — BUPROPION HYDROCHLORIDE 150 MG/1
150 TABLET ORAL EVERY MORNING
Qty: 90 TABLET | Refills: 0 | Status: SHIPPED | OUTPATIENT
Start: 2022-02-09 | End: 2022-05-09

## 2022-02-09 NOTE — PROGRESS NOTES
"Jeanne is a 46 year old who is being evaluated via a billable video visit.      How would you like to obtain your AVS? MyChart  If the video visit is dropped, the invitation should be resent by: Text to cell phone: [phone]  Will anyone else be joining your video visit? No        Assessment & Plan     1. Obesity (BMI 30-39.9)    2. Seasonal affective disorder (H)        1,2) Will start Wellbutrin XL 150mg daily. We discussed the potential risks with recurrent or long term phentermine use. I also offered a referral to see weight management.       Prescription drug management         BMI:   Estimated body mass index is 39.31 kg/m  as calculated from the following:    Height as of 8/19/21: 1.702 m (5' 7\").    Weight as of 8/19/21: 113.9 kg (251 lb).     Return in about 3 months (around 5/9/2022) for your annual physical, fasting labs, weight recheck.    Anh Cortez PA-C  [facility] Angel is a 46 year old who presents for the following health issues     HPI     Weight Concerns  Discuss restarting phentermine, patient stopped taking 1 month ago, would like to discuss     Depression Followup     How are you doing with your depression since your last visit? Worsened over few months    Are you having other symptoms that might be associated with depression? No    Have you had a significant life event?  No     Are you feeling anxious or having panic attacks?   No    Do you have any concerns with your use of alcohol or other drugs? No     Has been on Wellbutrin: 1734-7686  Possible SAD?  193 at  last week      Social History     Tobacco Use     Smoking status: Never Smoker     Smokeless tobacco: Never Used   Vaping Use     Vaping Use: Never used   Substance Use Topics     Alcohol use: No     Drug use: No     PHQ 2/12/2018 1/21/2019 1/31/2020   PHQ-9 Total Score 7 9 0   Q9: Thoughts of better off dead/self-harm past 2 weeks Not at all Not at all Not at all     MARY-7 " SCORE 12/10/2015 1/21/2019   Total Score 11 0       Suicide Assessment Five-step Evaluation and Treatment (SAFE-T)      How many servings of fruits and vegetables do you eat daily?  2-3    On average, how many sweetened beverages do you drink each day (Examples: soda, juice, sweet tea, etc.  Do NOT count diet or artificially sweetened beverages)?   0    How many days per week do you exercise enough to make your heart beat faster? 3 or less    How many minutes a day do you exercise enough to make your heart beat faster? 20 - 29    How many days per week do you miss taking your medication? 0    Review of Systems   Constitutional, HEENT, cardiovascular, pulmonary, GI, , musculoskeletal, neuro, skin, endocrine and psych systems are negative, except as otherwise noted.      Objective    Vitals - Patient Reported  Weight (Patient Reported): 87.5 kg (193 lb)      Vitals:  No vitals were obtained today due to virtual visit.    Physical Exam   GENERAL: Healthy, alert and no distress  EYES: Eyes grossly normal to inspection.  No discharge or erythema, or obvious scleral/conjunctival abnormalities.  RESP: No audible wheeze, cough, or visible cyanosis.  No visible retractions or increased work of breathing.    PSYCH: Mentation appears normal, affect normal/bright, judgement and insight intact, normal speech and appearance well-groomed.          Video-Visit Details    Type of service: video connection not available, converted to telephone visit, 14 mins

## 2022-02-13 ENCOUNTER — HEALTH MAINTENANCE LETTER (OUTPATIENT)
Age: 47
End: 2022-02-13

## 2022-03-27 ENCOUNTER — TRANSFERRED RECORDS (OUTPATIENT)
Dept: HEALTH INFORMATION MANAGEMENT | Facility: CLINIC | Age: 47
End: 2022-03-27
Payer: COMMERCIAL

## 2022-03-29 ENCOUNTER — OFFICE VISIT (OUTPATIENT)
Dept: ORTHOPEDICS | Facility: CLINIC | Age: 47
End: 2022-03-29
Payer: COMMERCIAL

## 2022-03-29 VITALS — DIASTOLIC BLOOD PRESSURE: 80 MMHG | SYSTOLIC BLOOD PRESSURE: 108 MMHG

## 2022-03-29 DIAGNOSIS — M25.511 CHRONIC RIGHT SHOULDER PAIN: Primary | ICD-10-CM

## 2022-03-29 DIAGNOSIS — G89.29 CHRONIC RIGHT SHOULDER PAIN: Primary | ICD-10-CM

## 2022-03-29 PROCEDURE — 20611 DRAIN/INJ JOINT/BURSA W/US: CPT | Mod: RT | Performed by: FAMILY MEDICINE

## 2022-03-29 PROCEDURE — 99212 OFFICE O/P EST SF 10 MIN: CPT | Mod: 25 | Performed by: FAMILY MEDICINE

## 2022-03-29 RX ADMIN — ROPIVACAINE HYDROCHLORIDE 4 ML: 5 INJECTION, SOLUTION EPIDURAL; INFILTRATION; PERINEURAL at 17:03

## 2022-03-29 RX ADMIN — BETAMETHASONE SODIUM PHOSPHATE AND BETAMETHASONE ACETATE 6 MG: 3; 3 INJECTION, SUSPENSION INTRA-ARTICULAR; INTRALESIONAL; INTRAMUSCULAR; SOFT TISSUE at 17:03

## 2022-03-29 ASSESSMENT — PAIN SCALES - GENERAL: PAINLEVEL: MODERATE PAIN (4)

## 2022-03-29 NOTE — PROGRESS NOTES
ASSESSMENT & PLAN    # Chronic Right Shoulder Pain: Patient has had recurrence over symptoms since 12/8/2020 with pain with range of motion.  Previous steroid injection on 4/22/2021 in the subacromial region provided no significant relief.  Glenohumeral steroid injection on 5/13/2021 provided significant relief up until over the past month.  Today pain over anterior lateral portion of her shoulder.  Pain limiting ability to move her shoulder and hold things.  Most recent steroid injection on 9/7/2021 in the right subacromial bursa provided significant relief.  She would like a repeat of this if possible.   Counseled patient on treatment options including referral to surgery, continue physical therapy, home exercises, repeat steroid injections.  Given previously injection helped her symptoms significantly plan to treat as below and follow-up as needed  Treatment: Activities as tolerated   Physical Therapy continue home exercises  Injection repeat right subacromial steroid injection  Medications Limited tylenol/ibuprofen for pain for 1-2 weeks    -----    SUBJECTIVE:  Jeanne Wasserman is a 46 year old female who is seen in follow-up for right shoulder pain.They were last seen on 9/7/2021. Right subacromial injection on 9/7/2021 allowed for relief for 4 months and she is interested in a repeat corticosteroid injection today. Pain with prolonged sitting with her elbow in flexion and internal rotation. Sleeping is very difficult. Pain is located deep in the right shoulder joint.     They indicate that their current pain level is 4/10. They have tried corticosteroid injections, massage, ice, heat, daily Tylenol and HEP.        Patient's past medical, surgical, social, and family histories were reviewed today and no changes are noted.    REVIEW OF SYSTEMS:  Constitutional: NEGATIVE for fever, chills, change in weight  Skin: NEGATIVE for worrisome rashes, moles or lesions  GI/: NEGATIVE for bowel or bladder changes  Neuro:  NEGATIVE for weakness, dizziness or paresthesias    OBJECTIVE:  LMP 07/04/2019 (Approximate)    General: healthy, alert and in no distress  HEENT: no scleral icterus or conjunctival erythema  Skin: no suspicious lesions or rash. No jaundice.  CV: regular rhythm by palpation, no pedal edema  Resp: normal respiratory effort without conversational dyspnea   Psych: normal mood and affect  Gait: normal steady gait with appropriate coordination and balance  Neuro: normal light touch sensory exam of the extremities.    MSK:    RIGHT SHOULDER  Inspection:    no swelling, bruising, discoloration, or obvious deformity or asymmetry  Palpation:    Tender about the supraspinatus insertion. Remainder of bony and tendinous landmarks are nontender.  Active Range of Motion:     Abduction 1650, FF 1650, , IR L1.    Strength:    Scapular plane abduction 5-/5, painful,  ER 5/5, IR 5/5, biceps 5/5, triceps 5/5  Special Tests:    Positive: Neer's, Mattson' and supraspinatus (empty can) pain no weakness    Negative: Speed's and Yergason's        Large Joint Injection/Arthocentesis: R subacromial bursa    Date/Time: 3/29/2022 5:03 PM  Performed by: Tye Cam MD  Authorized by: Tye Cam MD     Indications:  Pain  Needle Size:  25 G  Guidance: ultrasound    Approach:  Lateral  Location:  Shoulder      Site:  R subacromial bursa  Medications:  6 mg betamethasone acet & sod phos 6 (3-3) MG/ML; 4 mL ropivacaine 5 MG/ML  Medications comment:  Actual amount of ropivacaine used 4 mL  Outcome:  Tolerated well, no immediate complications  Procedure discussed: discussed risks, benefits, and alternatives    Consent Given by:  Patient  Timeout: timeout called immediately prior to procedure    Prep: patient was prepped and draped in usual sterile fashion     Ultrasound images of procedure were permanently stored.     Patient reported significant improvement of pain after the numbing portion right subacromial steroid injection.   Ultrasound guided images were permanently stored.   Aftercare instructions given to patient.  Plan to follow-up as discussed above.     Tye Cam MD [facility]          Independent visualization of the below image:       Tye Cam MD, [facility]

## 2022-03-29 NOTE — LETTER
3/29/2022         RE: Jeanne Wasserman  [address]  [place] [place] [address]        Dear Colleague,    Thank you for referring your patient, Jeanne Wasserman, to the [facility]. Please see a copy of my visit note below.    ASSESSMENT & PLAN    # Chronic Right Shoulder Pain: Patient has had recurrence over symptoms since 12/8/2020 with pain with range of motion.  Previous steroid injection on 4/22/2021 in the subacromial region provided no significant relief.  Glenohumeral steroid injection on 5/13/2021 provided significant relief up until over the past month.  Today pain over anterior lateral portion of her shoulder.  Pain limiting ability to move her shoulder and hold things.  Most recent steroid injection on 9/7/2021 in the right subacromial bursa provided significant relief.  She would like a repeat of this if possible.   Counseled patient on treatment options including referral to surgery, continue physical therapy, home exercises, repeat steroid injections.  Given previously injection helped her symptoms significantly plan to treat as below and follow-up as needed  Treatment: Activities as tolerated   Physical Therapy continue home exercises  Injection repeat right subacromial steroid injection  Medications Limited tylenol/ibuprofen for pain for 1-2 weeks    -----    SUBJECTIVE:  Jeanne Wasserman is a 46 year old female who is seen in follow-up for right shoulder pain.They were last seen on 9/7/2021. Right subacromial injection on 9/7/2021 allowed for relief for 4 months and she is interested in a repeat corticosteroid injection today. Pain with prolonged sitting with her elbow in flexion and internal rotation. Sleeping is very difficult. Pain is located deep in the right shoulder joint.     They indicate that their current pain level is 4/10. They have tried corticosteroid injections, massage, ice, heat, daily Tylenol and HEP.        Patient's past medical,  surgical, social, and family histories were reviewed today and no changes are noted.    REVIEW OF SYSTEMS:  Constitutional: NEGATIVE for fever, chills, change in weight  Skin: NEGATIVE for worrisome rashes, moles or lesions  GI/: NEGATIVE for bowel or bladder changes  Neuro: NEGATIVE for weakness, dizziness or paresthesias    OBJECTIVE:  LMP 07/04/2019 (Approximate)    General: healthy, alert and in no distress  HEENT: no scleral icterus or conjunctival erythema  Skin: no suspicious lesions or rash. No jaundice.  CV: regular rhythm by palpation, no pedal edema  Resp: normal respiratory effort without conversational dyspnea   Psych: normal mood and affect  Gait: normal steady gait with appropriate coordination and balance  Neuro: normal light touch sensory exam of the extremities.    MSK:    RIGHT SHOULDER  Inspection:    no swelling, bruising, discoloration, or obvious deformity or asymmetry  Palpation:    Tender about the supraspinatus insertion. Remainder of bony and tendinous landmarks are nontender.  Active Range of Motion:     Abduction 1650, FF 1650, , IR L1.    Strength:    Scapular plane abduction 5-/5, painful,  ER 5/5, IR 5/5, biceps 5/5, triceps 5/5  Special Tests:    Positive: Neer's, Mattson' and supraspinatus (empty can) pain no weakness    Negative: Speed's and Yergason's        Large Joint Injection/Arthocentesis: R subacromial bursa    Date/Time: 3/29/2022 5:03 PM  Performed by: Tye Cam MD  Authorized by: Tye Cam MD     Indications:  Pain  Needle Size:  25 G  Guidance: ultrasound    Approach:  Lateral  Location:  Shoulder      Site:  R subacromial bursa  Medications:  6 mg betamethasone acet & sod phos 6 (3-3) MG/ML; 4 mL ropivacaine 5 MG/ML  Medications comment:  Actual amount of ropivacaine used 4 mL  Outcome:  Tolerated well, no immediate complications  Procedure discussed: discussed risks, benefits, and alternatives    Consent Given by:  Patient  Timeout: timeout  called immediately prior to procedure    Prep: patient was prepped and draped in usual sterile fashion     Ultrasound images of procedure were permanently stored.     Patient reported significant improvement of pain after the numbing portion right subacromial steroid injection.  Ultrasound guided images were permanently stored.   Aftercare instructions given to patient.  Plan to follow-up as discussed above.     Tye Cam MD [facility]          Independent visualization of the below image:       Tye Cam MD, [facility]        Again, thank you for allowing me to participate in the care of your patient.        Sincerely,        Tye Cam MD

## 2022-04-09 NOTE — PATIENT INSTRUCTIONS
# Chronic Right Shoulder Pain: Patient has had recurrence over symptoms since 12/8/2020 with pain with range of motion.  Previous steroid injection on 4/22/2021 in the subacromial region provided no significant relief.  Glenohumeral steroid injection on 5/13/2021 provided significant relief up until over the past month.  Pain limiting ability to move her shoulder and hold things.  Counseled patient on treatment options including referral to surgery, continue physical therapy, home exercises, repeat steroid injections.  Given previously injection helped her symptoms significantly plan to treat as below and follow-up as needed  Treatment: Activities as tolerated   Physical Therapy continue home exercises  Injection repeat right glenohumeral steroid  Medications Limited tylenol/ibuprofen for pain for 1-2 weeks

## 2022-04-15 RX ORDER — BETAMETHASONE SODIUM PHOSPHATE AND BETAMETHASONE ACETATE 3; 3 MG/ML; MG/ML
6 INJECTION, SUSPENSION INTRA-ARTICULAR; INTRALESIONAL; INTRAMUSCULAR; SOFT TISSUE
Status: DISCONTINUED | OUTPATIENT
Start: 2022-03-29 | End: 2023-12-14

## 2022-04-15 RX ORDER — ROPIVACAINE HYDROCHLORIDE 5 MG/ML
4 INJECTION, SOLUTION EPIDURAL; INFILTRATION; PERINEURAL
Status: DISCONTINUED | OUTPATIENT
Start: 2022-03-29 | End: 2023-12-14

## 2022-05-09 ENCOUNTER — TELEPHONE (OUTPATIENT)
Dept: FAMILY MEDICINE | Facility: CLINIC | Age: 47
End: 2022-05-09

## 2022-05-09 ENCOUNTER — VIRTUAL VISIT (OUTPATIENT)
Dept: FAMILY MEDICINE | Facility: CLINIC | Age: 47
End: 2022-05-09
Payer: COMMERCIAL

## 2022-05-09 DIAGNOSIS — E53.8 B12 DEFICIENCY: ICD-10-CM

## 2022-05-09 DIAGNOSIS — E66.9 OBESITY (BMI 30-39.9): ICD-10-CM

## 2022-05-09 DIAGNOSIS — F33.8 SEASONAL AFFECTIVE DISORDER (H): ICD-10-CM

## 2022-05-09 DIAGNOSIS — E53.8 VITAMIN B12 DEFICIENCY (NON ANEMIC): Primary | ICD-10-CM

## 2022-05-09 DIAGNOSIS — D50.9 IRON DEFICIENCY ANEMIA, UNSPECIFIED IRON DEFICIENCY ANEMIA TYPE: ICD-10-CM

## 2022-05-09 PROCEDURE — 99214 OFFICE O/P EST MOD 30 MIN: CPT | Mod: 95 | Performed by: PHYSICIAN ASSISTANT

## 2022-05-09 RX ORDER — CYANOCOBALAMIN 1000 UG/ML
1 INJECTION, SOLUTION INTRAMUSCULAR; SUBCUTANEOUS
Qty: 1 ML | Refills: 2 | Status: SHIPPED | OUTPATIENT
Start: 2022-05-09

## 2022-05-09 RX ORDER — PHENTERMINE HYDROCHLORIDE 15 MG/1
15 CAPSULE ORAL EVERY MORNING
Qty: 90 CAPSULE | Refills: 0 | Status: SHIPPED | OUTPATIENT
Start: 2022-05-09 | End: 2022-07-25

## 2022-05-09 NOTE — PROGRESS NOTES
Jeanne is a 47 year old who is being evaluated via a billable video visit.      How would you like to obtain your AVS? MyChart  If the video visit is dropped, the invitation should be resent by: Text to cell phone: [phone]  Will anyone else be joining your video visit? No    Video Start Time: 12:15 PM        Subjective   Jeanne is a 47 year old who presents for the following health issues     HPI     Medication Followup of topiratmate    Taking Medication as prescribed: yes    Side Effects:  None    Medication Helping Symptoms:  yes     Weight concerns  - would like to discuss phentermine  Worked very well. Has been off of it for 5 months. Tolerated it well.     Has started Noom . Does a lot of walking. Joined [employer] . Planning to work out a lot more consistently .      Review of Systems   Constitutional, HEENT, cardiovascular, pulmonary, GI, , musculoskeletal, neuro, skin, endocrine and psych systems are negative, except as otherwise noted.      Objective           Vitals:  No vitals were obtained today due to virtual visit.    Physical Exam   GENERAL: Healthy, alert and no distress  EYES: Eyes grossly normal to inspection.  No discharge or erythema, or obvious scleral/conjunctival abnormalities.  RESP: No audible wheeze, cough, or visible cyanosis.  No visible retractions or increased work of breathing.    SKIN: Visible skin clear. No significant rash, abnormal pigmentation or lesions.  NEURO: Cranial nerves grossly intact.  Mentation and speech appropriate for age.  PSYCH: Mentation appears normal, affect normal/bright, judgement and insight intact, normal speech and appearance well-groomed.  Diagnoses and all orders for this visit:    Vitamin B12 deficiency (non anemic)    Obesity (BMI 30-39.9)  -     phentermine (ADIPEX-P) 15 MG capsule; Take 1 capsule (15 mg) by mouth every morning  -     TSH with free T4 reflex; Future    B12 deficiency  -     cyanocobalamin (CYANOCOBALAMIN) 1000 MCG/ML  injection; Inject 1 mL (1,000 mcg) into the muscle every 4 months  -     CBC with platelets and differential; Future  -     Vitamin B12; Future    Iron deficiency anemia, unspecified iron deficiency anemia type  -     CBC with platelets and differential; Future  -     Ferritin; Future  -     Iron and iron binding capacity; Future    Other orders  -     REVIEW OF HEALTH MAINTENANCE PROTOCOL ORDERS      work on lifestyle modification  History of anemia. Due for recheck of labs.   Recheck in 3 mos   Video-Visit Details    Type of service:  Video Visit    Video End Time:12:35 PM    Originating Location (pt. Location): Home    Distant Location (provider location):  [facility]     Platform used for Video Visit: Angélica

## 2022-05-09 NOTE — TELEPHONE ENCOUNTER
Central Prior Authorization Team   Phone: [phone]      PRIOR AUTHORIZATION DENIED    Medication: Phentermine - EPA Denied     Denial Date: 5/9/2022    Denial Rational:   You do not meet the requirements of your plan.  Your plan covers this drug when you have not received 3 months of therapy with the  requested drug within the past year      Appeal Information: If the Provider/Patient would like to appeal this denial, please provide a letter of medical necessity explaining why Preferred Formulary medications are not appropriate in the treatment of the patient's condition; along with any previous therapies tried/failed.    Once completed, please route back to me directly: Leonardo Vogt

## 2022-05-10 NOTE — TELEPHONE ENCOUNTER
Called patient, she stated she has already paid out of pocket and was aware of this.     Aggie Cummins RN

## 2022-05-13 RX ORDER — BUPROPION HYDROCHLORIDE 150 MG/1
TABLET ORAL
Qty: 90 TABLET | Refills: 0 | Status: SHIPPED | OUTPATIENT
Start: 2022-05-13 | End: 2022-08-10

## 2022-07-25 ENCOUNTER — VIRTUAL VISIT (OUTPATIENT)
Dept: FAMILY MEDICINE | Facility: CLINIC | Age: 47
End: 2022-07-25
Payer: COMMERCIAL

## 2022-07-25 DIAGNOSIS — E66.811 CLASS 1 OBESITY WITHOUT SERIOUS COMORBIDITY WITH BODY MASS INDEX (BMI) OF 33.0 TO 33.9 IN ADULT, UNSPECIFIED OBESITY TYPE: Primary | ICD-10-CM

## 2022-07-25 DIAGNOSIS — E66.9 OBESITY (BMI 30-39.9): ICD-10-CM

## 2022-07-25 PROCEDURE — 99213 OFFICE O/P EST LOW 20 MIN: CPT | Mod: GT | Performed by: PHYSICIAN ASSISTANT

## 2022-07-25 RX ORDER — PHENTERMINE HYDROCHLORIDE 30 MG/1
30 CAPSULE ORAL EVERY MORNING
Qty: 90 CAPSULE | Refills: 0 | Status: SHIPPED | OUTPATIENT
Start: 2022-07-25 | End: 2022-12-21

## 2022-07-25 NOTE — PROGRESS NOTES
Jeanne is a 47 year old who is being evaluated via a billable video visit.      How would you like to obtain your AVS? Mail a copy  If the video visit is dropped, the invitation should be resent by: Text to cell phone: [phone]  Will anyone else be joining your video visit? No            Subjective   Jeanne is a 47 year old presenting for the following health issues:  No chief complaint on file.      HPI     Follow up weight loss meds  -Weight loss  -LS 5/9/22 with leila Hernandez  Started phentermine in May. Has helped her lose a few lbs. Denies side effects. Walking consistently.  Watches her diet.         Review of Systems   Constitutional, HEENT, cardiovascular, pulmonary, GI, , musculoskeletal, neuro, skin, endocrine and psych systems are negative, except as otherwise noted.      Objective           Vitals:  No vitals were obtained today due to virtual visit.    Physical Exam   GENERAL: Healthy, alert and no distress  EYES: Eyes grossly normal to inspection.  No discharge or erythema, or obvious scleral/conjunctival abnormalities.  RESP: No audible wheeze, cough, or visible cyanosis.  No visible retractions or increased work of breathing.    SKIN: Visible skin clear. No significant rash, abnormal pigmentation or lesions.  NEURO: Cranial nerves grossly intact.  Mentation and speech appropriate for age.  PSYCH: Mentation appears normal, affect normal/bright, judgement and insight intact, normal speech and appearance well-groomed.    Jeanne was seen today for recheck.    Diagnoses and all orders for this visit:    Class 1 obesity without serious comorbidity with body mass index (BMI) of 33.0 to 33.9 in adult, unspecified obesity type  -     phentermine (ADIPEX-P) 30 MG capsule; Take 1 capsule (30 mg) by mouth every morning    Obesity (BMI 30-39.9)  -     phentermine (ADIPEX-P) 30 MG capsule; Take 1 capsule (30 mg) by mouth every morning    inc phentermine to 30 mg daily.  work on lifestyle  modification      Video-Visit Details    Video Start Time: 5:23 PM    Type of service:  Video Visit    Video End Time:5:31 PM    Originating Location (pt. Location): Home    Distant Location (provider location):  [facility]     Platform used for Video Visit: Angélica    .  Radha.

## 2022-07-26 ENCOUNTER — TELEPHONE (OUTPATIENT)
Dept: FAMILY MEDICINE | Facility: CLINIC | Age: 47
End: 2022-07-26

## 2022-07-26 NOTE — TELEPHONE ENCOUNTER
Prior Authorization Retail Medication Request    Medication/Dose: phentermine (ADIPEX-P) 30 MG capsule  ICD code (if different than what is on RX):  E66.9, Z68.33/E66.9  Previously Tried and Failed:  na  Rationale:  na    Insurance Name:  PREFERREDONE  Insurance ID:  [id number]      Pharmacy Information (if different than what is on RX)  [facility]  Phone:  [phone]

## 2022-07-27 NOTE — TELEPHONE ENCOUNTER
Central Prior Authorization Team   Phone: [phone]      PA Initiation    Medication: phentermine (ADIPEX-P) 30 MG capsule - INITIATED  Insurance Company: [employer] - Phone [phone] Fax [phone]  [id number] - [address]  AT [facility]  Filling Pharmacy Phone: [phone]  Filling Pharmacy Fax: [phone]  Start Date: 7/27/2022

## 2022-07-27 NOTE — TELEPHONE ENCOUNTER
Central Prior Authorization Team   Phone: [phone]      PRIOR AUTHORIZATION DENIED    Medication: phentermine (ADIPEX-P) 30 MG capsule - DENIED    Denial Date: 7/27/2022    Denial Rational:       Appeal Information:

## 2022-08-10 ENCOUNTER — VIRTUAL VISIT (OUTPATIENT)
Dept: FAMILY MEDICINE | Facility: CLINIC | Age: 47
End: 2022-08-10
Payer: COMMERCIAL

## 2022-08-10 ENCOUNTER — MYC MEDICAL ADVICE (OUTPATIENT)
Dept: FAMILY MEDICINE | Facility: CLINIC | Age: 47
End: 2022-08-10

## 2022-08-10 DIAGNOSIS — U07.1 INFECTION DUE TO 2019 NOVEL CORONAVIRUS: Primary | ICD-10-CM

## 2022-08-10 PROCEDURE — 99213 OFFICE O/P EST LOW 20 MIN: CPT | Mod: 95 | Performed by: INTERNAL MEDICINE

## 2022-08-10 ASSESSMENT — PATIENT HEALTH QUESTIONNAIRE - PHQ9
10. IF YOU CHECKED OFF ANY PROBLEMS, HOW DIFFICULT HAVE THESE PROBLEMS MADE IT FOR YOU TO DO YOUR WORK, TAKE CARE OF THINGS AT HOME, OR GET ALONG WITH OTHER PEOPLE: NOT DIFFICULT AT ALL
SUM OF ALL RESPONSES TO PHQ QUESTIONS 1-9: 0
SUM OF ALL RESPONSES TO PHQ QUESTIONS 1-9: 0

## 2022-08-10 NOTE — PROGRESS NOTES
"Jeanne is a 47 year old who is being evaluated via a billable video visit.      How would you like to obtain your AVS? MyChart  If the video visit is dropped, the invitation should be resent by: Text to cell phone: [phone]  Will anyone else be joining your video visit? No          Assessment & Plan     Infection due to 2019 novel coronavirus  Started having symptoms of congestion/sore throat/body aches since Monday  No shortness of breath  No cough  Tested positive for COVID  She is interested in treatment options  She had a pulse oximeter at home and has been monitoring her saturations  Saturations have been good at around 95%  We discussed the side effects of Paxlovid  These include bitter taste/nausea/diarrhea/dizziness  Rebound COVID discussed  Quarantine measures discussed  She is on Adipex and Topamax  Both of these have low drug interactions with Paxlovid  - nirmatrelvir and ritonavir (PAXLOVID) therapy pack; Take 3 tablets by mouth 2 times daily for 5 days (Take 2 Nirmatrelvir tablets and 1 Ritonavir tablet twice daily for 5 days)      20 minutes spent on the date of the encounter doing chart review, history and exam, documentation and further activities per the note       BMI:   Estimated body mass index is 39.31 kg/m  as calculated from the following:    Height as of 8/19/21: 1.702 m (5' 7\").    Weight as of 8/19/21: 113.9 kg (251 lb).           Return in about 4 weeks (around 9/7/2022), or if symptoms worsen or fail to improve.    Sundeep Warren MD  [facility]   Jeanne is a 47 year old, presenting for the following health issues:  No chief complaint on file.      HPI   Here to discuss treatment options for COVID        Review of Systems   Constitutional, HEENT, cardiovascular, pulmonary, gi and gu systems are negative, except as otherwise noted.      Objective           Vitals:  No vitals were obtained today due to virtual visit.    Physical Exam   GENERAL: " Healthy, alert and no distress  EYES: Eyes grossly normal to inspection.  No discharge or erythema, or obvious scleral/conjunctival abnormalities.  RESP: No audible wheeze, cough, or visible cyanosis.  No visible retractions or increased work of breathing.    SKIN: Visible skin clear. No significant rash, abnormal pigmentation or lesions.  NEURO: Cranial nerves grossly intact.  Mentation and speech appropriate for age.  PSYCH: Mentation appears normal, affect normal/bright, judgement and insight intact, normal speech and appearance well-groomed.              This is not a video visit  There were technical issues so this was converted to telephone visit  Length of telephone call was 10 minutes      Distant Location (provider location):  [facility]       .  ..  Answers for HPI/ROS submitted by the patient on 8/10/2022  If you checked off any problems, how difficult have these problems made it for you to do your work, take care of things at home, or get along with other people?: Not difficult at all  PHQ9 TOTAL SCORE: 0

## 2022-08-11 ENCOUNTER — TELEPHONE (OUTPATIENT)
Dept: FAMILY MEDICINE | Facility: CLINIC | Age: 47
End: 2022-08-11

## 2022-08-11 NOTE — TELEPHONE ENCOUNTER
Central Prior Authorization Team   Phone: [phone]    PA Initiation    Medication: phentermine (ADIPEX-P) 30 MG capsule  Insurance Company: [employer] - Phone [phone] Fax [phone]  [facility] Rx[id number] [address]  AT [facility]  Filling Pharmacy Phone: [phone]  Filling Pharmacy Fax:    Start Date: 8/11/2022

## 2022-08-11 NOTE — TELEPHONE ENCOUNTER
Prior Authorization Retail Medication Request    Medication/Dose: phentermine (ADIPEX-P) 30 MG capsule  ICD code (if different than what is on RX):  E66.9, Z68.33/E66.9  Previously Tried and Failed:  na  Rationale:  na    Insurance Name:  PREFERREDONE  Insurance ID:  [id number]      Pharmacy Information (if different than what is on RX)  Name:  [facility]  Phone:  [phone]

## 2022-08-15 NOTE — TELEPHONE ENCOUNTER
Prior Authorization Approval    Authorization Effective Date: 8/12/2022  Authorization Expiration Date: 11/12/2022  Medication: phentermine (ADIPEX-P) 30 MG capsule  Approved Dose/Quantity:    Reference #:     Insurance Company: [employer] - Phone [phone] Fax [phone]  Expected CoPay:       CoPay Card Available:      Foundation Assistance Needed:    Which Pharmacy is filling the prescription (Not needed for infusion/clinic administered): [facility] #[id number] [address]  AT [facility]  Pharmacy Notified: Yes  Patient Notified: Yes  **Instructed pharmacy to notify patient when script is ready to /ship.**

## 2022-10-15 ENCOUNTER — HEALTH MAINTENANCE LETTER (OUTPATIENT)
Age: 47
End: 2022-10-15

## 2022-11-06 DIAGNOSIS — M54.2 NECK PAIN: ICD-10-CM

## 2022-11-06 DIAGNOSIS — M54.12 CERVICAL RADICULOPATHY: ICD-10-CM

## 2022-11-07 RX ORDER — TOPIRAMATE 50 MG/1
TABLET, FILM COATED ORAL
Qty: 180 TABLET | Refills: 0 | Status: SHIPPED | OUTPATIENT
Start: 2022-11-07 | End: 2023-02-03

## 2022-12-21 ENCOUNTER — VIRTUAL VISIT (OUTPATIENT)
Dept: FAMILY MEDICINE | Facility: CLINIC | Age: 47
End: 2022-12-21
Payer: COMMERCIAL

## 2022-12-21 DIAGNOSIS — E66.9 OBESITY (BMI 30-39.9): ICD-10-CM

## 2022-12-21 DIAGNOSIS — F32.0 CURRENT MILD EPISODE OF MAJOR DEPRESSIVE DISORDER WITHOUT PRIOR EPISODE (H): Primary | ICD-10-CM

## 2022-12-21 DIAGNOSIS — E66.811 CLASS 1 OBESITY WITHOUT SERIOUS COMORBIDITY WITH BODY MASS INDEX (BMI) OF 33.0 TO 33.9 IN ADULT, UNSPECIFIED OBESITY TYPE: ICD-10-CM

## 2022-12-21 PROCEDURE — 99214 OFFICE O/P EST MOD 30 MIN: CPT | Mod: 95 | Performed by: INTERNAL MEDICINE

## 2022-12-21 RX ORDER — PHENTERMINE HYDROCHLORIDE 30 MG/1
30 CAPSULE ORAL EVERY MORNING
Qty: 90 CAPSULE | Refills: 0 | Status: SHIPPED | OUTPATIENT
Start: 2022-12-21 | End: 2023-04-12

## 2022-12-21 ASSESSMENT — PATIENT HEALTH QUESTIONNAIRE - PHQ9: SUM OF ALL RESPONSES TO PHQ QUESTIONS 1-9: 4

## 2022-12-21 NOTE — PROGRESS NOTES
Jeanne is a 47 year old who is being evaluated via a billable video visit.      How would you like to obtain your AVS? MyChart  If the video visit is dropped, the invitation should be resent by: Text to cell phone: [phone]  Will anyone else be joining your video visit? No          Assessment & Plan     Obesity (BMI 30-39.9)  She has been on this medication on and off for the last 1 year at least  No side effects  Her blood pressure and heart rate have been stable  She would like to continue this medication  She lost at least 50 pounds  - phentermine (ADIPEX-P) 30 MG capsule; Take 1 capsule (30 mg) by mouth every morning    Class 1 obesity without serious comorbidity with body mass index (BMI) of 33.0 to 33.9 in adult, unspecified obesity type  As above she has been on this medication at least for the last 1 year on and off on is getting good results from it  - phentermine (ADIPEX-P) 30 MG capsule; Take 1 capsule (30 mg) by mouth every morning    Current mild episode of major depressive disorder without prior episode (H)  PHQ-9 score is not bad today however she is very tearful  She tried some talking therapy which did not help  She would like some medications now  Discussed the side effects of Prozac  Will start at 20 mg and if she responds well we can always increase the dose to 40 mg if need be or stay at 20 mg  - FLUoxetine (PROZAC) 20 MG capsule; Take 1 capsule (20 mg) by mouth daily      30 minutes spent on the date of the encounter doing chart review, history and exam, documentation and further activities per the note           Return in about 6 months (around 6/21/2023).    Sundeep Warren MD  [facility]   Jeanne is a 47 year old, presenting for the following health issues:  Refill Request and Depression      History of Present Illness       Reason for visit:  Med refill, depression symptoms    She eats 2-3 servings of fruits and vegetables daily.She consumes 0  sweetened beverage(s) daily.She exercises with enough effort to increase her heart rate 20 to 29 minutes per day.  She exercises with enough effort to increase her heart rate 5 days per week.   She is taking medications regularly.       Abnormal Mood Symptoms  Onset/Duration: a few months   Description:   Depression (if yes, do PHQ-9): YES  Anxiety (if yes, do MARY-7): No  Accompanying Signs & Symptoms:  Still participating in activities that you used to enjoy: YES  Fatigue: No  Irritability: No  Difficulty concentrating: YES  Changes in appetite: No  Problems with sleep: YES  Heart racing/beating fast: No  Abnormally elevated, expansive, or irritable mood: No  Persistently increased activity or energy: No  Thoughts of hurting yourself or others: No  History:  Recent stress or major life event: YES  Prior depression or anxiety: None  Family history of depression or anxiety: No  Alcohol/drug use: No  Difficulty sleeping: No  Precipitating or alleviating factors: loss of family members   Therapies tried and outcome: tried Wellbutrin, tried therapy, vitam's  PHQ 1/21/2019 1/31/2020 8/10/2022   PHQ-9 Total Score 9 0 0   Q9: Thoughts of better off dead/self-harm past 2 weeks Not at all Not at all Not at all     MARY-7 SCORE 12/10/2015 1/21/2019   Total Score 11 0         Review of Systems   Constitutional, HEENT, cardiovascular, pulmonary, gi and gu systems are negative, except as otherwise noted.      Objective           Vitals:  No vitals were obtained today due to virtual visit.    Physical Exam   GENERAL: Healthy, alert and no distress  EYES: Eyes grossly normal to inspection.  No discharge or erythema, or obvious scleral/conjunctival abnormalities.  RESP: No audible wheeze, cough, or visible cyanosis.  No visible retractions or increased work of breathing.    SKIN: Visible skin clear. No significant rash, abnormal pigmentation or lesions.  NEURO: Cranial nerves grossly intact.  Mentation and speech appropriate for  age.  PSYCH: Mentation appears normal, affect normal/bright, judgement and insight intact, normal speech and appearance well-groomed.                Video-Visit Details    Type of service:  Video Visit     Originating Location (pt. Location): Home    Distant Location (provider location):  Off-site  Platform used for Video Visit: GretaWell

## 2022-12-23 ENCOUNTER — TELEPHONE (OUTPATIENT)
Dept: FAMILY MEDICINE | Facility: CLINIC | Age: 47
End: 2022-12-23

## 2022-12-23 NOTE — TELEPHONE ENCOUNTER
phentermine (ADIPEX-P) 30 MG capsule    Please initiate PA  Login to: [email or web address]  Key: [id number]

## 2023-01-10 ENCOUNTER — TRANSFERRED RECORDS (OUTPATIENT)
Dept: HEALTH INFORMATION MANAGEMENT | Facility: CLINIC | Age: 48
End: 2023-01-10

## 2023-01-21 ENCOUNTER — MYC MEDICAL ADVICE (OUTPATIENT)
Dept: FAMILY MEDICINE | Facility: CLINIC | Age: 48
End: 2023-01-21
Payer: COMMERCIAL

## 2023-01-21 DIAGNOSIS — J20.9 ACUTE BRONCHITIS, UNSPECIFIED ORGANISM: Primary | ICD-10-CM

## 2023-01-23 RX ORDER — AZITHROMYCIN 250 MG/1
TABLET, FILM COATED ORAL
Qty: 6 TABLET | Refills: 0 | Status: SHIPPED | OUTPATIENT
Start: 2023-01-23 | End: 2023-01-28

## 2023-02-03 ENCOUNTER — TELEPHONE (OUTPATIENT)
Dept: FAMILY MEDICINE | Facility: CLINIC | Age: 48
End: 2023-02-03
Payer: COMMERCIAL

## 2023-03-13 ENCOUNTER — TELEPHONE (OUTPATIENT)
Dept: FAMILY MEDICINE | Facility: CLINIC | Age: 48
End: 2023-03-13
Payer: COMMERCIAL

## 2023-03-14 NOTE — TELEPHONE ENCOUNTER
Attempted to call patient at home/mobile number, no answer, left message on voicemail; patient was instructed to return call to [facility] at [phone].    Lor Cardona RN  [facility]

## 2023-03-14 NOTE — TELEPHONE ENCOUNTER
Rx for topiramate was sent yesterday (03/13). Please advise if 90 day supply appropriate.    Jeanne Rodriguez RN   [facility]

## 2023-03-26 ENCOUNTER — HEALTH MAINTENANCE LETTER (OUTPATIENT)
Age: 48
End: 2023-03-26

## 2023-04-12 ENCOUNTER — MYC REFILL (OUTPATIENT)
Dept: FAMILY MEDICINE | Facility: CLINIC | Age: 48
End: 2023-04-12
Payer: COMMERCIAL

## 2023-04-12 DIAGNOSIS — E66.811 CLASS 1 OBESITY WITHOUT SERIOUS COMORBIDITY WITH BODY MASS INDEX (BMI) OF 33.0 TO 33.9 IN ADULT, UNSPECIFIED OBESITY TYPE: ICD-10-CM

## 2023-04-12 DIAGNOSIS — F32.0 CURRENT MILD EPISODE OF MAJOR DEPRESSIVE DISORDER WITHOUT PRIOR EPISODE (H): ICD-10-CM

## 2023-04-12 DIAGNOSIS — E66.9 OBESITY (BMI 30-39.9): ICD-10-CM

## 2023-04-13 RX ORDER — PHENTERMINE HYDROCHLORIDE 30 MG/1
30 CAPSULE ORAL EVERY MORNING
Qty: 30 CAPSULE | Refills: 2 | Status: SHIPPED | OUTPATIENT
Start: 2023-04-13 | End: 2023-09-06

## 2023-04-14 ENCOUNTER — TELEPHONE (OUTPATIENT)
Dept: FAMILY MEDICINE | Facility: CLINIC | Age: 48
End: 2023-04-14
Payer: COMMERCIAL

## 2023-04-14 NOTE — TELEPHONE ENCOUNTER
Prior Authorization Retail Medication Request    Medication/Dose: phentermine (ADIPEX-P) 30 MG capsule  ICD code (if different than what is on RX):    Previously Tried and Failed:    Rationale:      Insurance Name:    Insurance ID:        Pharmacy Information (if different than what is on RX)  Name:    Phone:

## 2023-04-19 NOTE — TELEPHONE ENCOUNTER
PA Initiation    Medication: phentermine (ADIPEX-P) 30 MG capsule - Initiated  Insurance Company: [employer] - Phone [phone] Fax [phone]  Pharmacy Filling the Rx: [id number] [address]  AT [facility]  Filling Pharmacy Phone: [phone]  Filling Pharmacy Fax: [phone]  Start Date: 4/19/2023

## 2023-04-19 NOTE — TELEPHONE ENCOUNTER
PRIOR AUTHORIZATION DENIED    Medication: phentermine (ADIPEX-P) 30 MG capsule -Denied    Denial Date: 4/19/2023    Denial Rational:         Appeal Information:
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
## 2023-07-14 ENCOUNTER — TELEPHONE (OUTPATIENT)
Dept: FAMILY MEDICINE | Facility: CLINIC | Age: 48
End: 2023-07-14
Payer: COMMERCIAL

## 2023-07-14 DIAGNOSIS — F32.0 CURRENT MILD EPISODE OF MAJOR DEPRESSIVE DISORDER WITHOUT PRIOR EPISODE (H): ICD-10-CM

## 2023-07-17 NOTE — TELEPHONE ENCOUNTER
Medication refill has been refused. NEEDS IN PERSON APPT.   Schedule an appointment when patient returns call to clinic.    RN may refill enough medication until appt.

## 2023-07-17 NOTE — TELEPHONE ENCOUNTER
Attempted to call patient with number on file ([phone] not in service) to relay provider's message below with no answer. Called pt's son's number on file ([phone]) to attempt to reach pt, no answer and left voicemail to call clinic back at [phone]. Sent [employer] message to pt call clinic back at [phone].     Zhanna Velasquez RN on 7/17/2023 at 2:14 PM

## 2023-07-18 NOTE — TELEPHONE ENCOUNTER
I again tried calling the home/mobile number on file.   Not in service.    Does not appear she has read her [employer] message yet.    Will leave encounter open to watch for [employer] response.    Lor Cardona RN  [facility]

## 2023-07-19 NOTE — TELEPHONE ENCOUNTER
Patient has been seeing Dr. Warren for medications.    Unable to get a hold of patient.    Routed to please advise on refills.    Kaur RN BSN  Triage Nurse  [facility]: [facility]  Ph: [phone]

## 2023-09-06 ENCOUNTER — VIRTUAL VISIT (OUTPATIENT)
Dept: FAMILY MEDICINE | Facility: CLINIC | Age: 48
End: 2023-09-06
Payer: COMMERCIAL

## 2023-09-06 DIAGNOSIS — M54.12 CERVICAL RADICULOPATHY: ICD-10-CM

## 2023-09-06 DIAGNOSIS — M54.2 NECK PAIN: ICD-10-CM

## 2023-09-06 DIAGNOSIS — Z12.11 SCREEN FOR COLON CANCER: ICD-10-CM

## 2023-09-06 DIAGNOSIS — F32.0 CURRENT MILD EPISODE OF MAJOR DEPRESSIVE DISORDER WITHOUT PRIOR EPISODE (H): ICD-10-CM

## 2023-09-06 DIAGNOSIS — E66.9 OBESITY (BMI 30-39.9): ICD-10-CM

## 2023-09-06 DIAGNOSIS — Z12.31 VISIT FOR SCREENING MAMMOGRAM: Primary | ICD-10-CM

## 2023-09-06 DIAGNOSIS — E66.811 CLASS 1 OBESITY WITHOUT SERIOUS COMORBIDITY WITH BODY MASS INDEX (BMI) OF 33.0 TO 33.9 IN ADULT, UNSPECIFIED OBESITY TYPE: ICD-10-CM

## 2023-09-06 PROCEDURE — 99214 OFFICE O/P EST MOD 30 MIN: CPT | Mod: VID | Performed by: INTERNAL MEDICINE

## 2023-09-06 RX ORDER — PHENTERMINE HYDROCHLORIDE 30 MG/1
30 CAPSULE ORAL EVERY MORNING
Qty: 30 CAPSULE | Refills: 2 | Status: SHIPPED | OUTPATIENT
Start: 2023-09-06 | End: 2023-12-05

## 2023-09-06 RX ORDER — TOPIRAMATE 50 MG/1
50 TABLET, FILM COATED ORAL 2 TIMES DAILY
Qty: 180 TABLET | Refills: 1 | Status: SHIPPED | OUTPATIENT
Start: 2023-09-06 | End: 2024-04-08

## 2023-09-06 ASSESSMENT — PATIENT HEALTH QUESTIONNAIRE - PHQ9
SUM OF ALL RESPONSES TO PHQ QUESTIONS 1-9: 1
SUM OF ALL RESPONSES TO PHQ QUESTIONS 1-9: 1
10. IF YOU CHECKED OFF ANY PROBLEMS, HOW DIFFICULT HAVE THESE PROBLEMS MADE IT FOR YOU TO DO YOUR WORK, TAKE CARE OF THINGS AT HOME, OR GET ALONG WITH OTHER PEOPLE: NOT DIFFICULT AT ALL
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
## 2023-09-08 ENCOUNTER — TELEPHONE (OUTPATIENT)
Dept: FAMILY MEDICINE | Facility: CLINIC | Age: 48
End: 2023-09-08
Payer: COMMERCIAL

## 2023-09-08 NOTE — TELEPHONE ENCOUNTER
"Prior Authorization Retail Medication Request    Medication/Dose: PHENTERMINE 30MG CAPS    A Prior Authorization has been started.  To submit the PA, please follow the instructions below:    Login to [email or web address] and \"enter a Key\"  KEY: [id number]  Last Name: Lux  : 1975     "

## 2023-09-11 NOTE — TELEPHONE ENCOUNTER
Central Prior Authorization Team  Phone: [phone]    PA Initiation    Medication: PHENTERMINE HCL 30 MG PO CAPS  Insurance Company: [employer] - Phone [phone] Fax [phone]  [facility] the [id number] [address]  [facility]  Filling Pharmacy Phone: [phone]  Filling Pharmacy Fax:    Start Date:  09/11/2023

## 2023-09-12 ENCOUNTER — ANCILLARY PROCEDURE (OUTPATIENT)
Dept: MAMMOGRAPHY | Facility: CLINIC | Age: 48
End: 2023-09-12
Attending: INTERNAL MEDICINE
Payer: COMMERCIAL

## 2023-09-12 DIAGNOSIS — Z12.31 VISIT FOR SCREENING MAMMOGRAM: ICD-10-CM

## 2023-09-12 PROCEDURE — 77067 SCR MAMMO BI INCL CAD: CPT | Mod: TC | Performed by: RADIOLOGY

## 2023-09-12 NOTE — TELEPHONE ENCOUNTER
Central Prior Authorization Team  Phone: [phone]    PRIOR AUTHORIZATION DENIED    Medication: PHENTERMINE HCL 30 MG PO [facility]  Insurance Company: [employer] - Phone [phone] Fax [phone]  Denial Date: 9/12/2023  Denial Rational:         Appeal Information:         Patient Notified:

## 2023-10-10 ENCOUNTER — TELEPHONE (OUTPATIENT)
Dept: FAMILY MEDICINE | Facility: CLINIC | Age: 48
End: 2023-10-10
Payer: COMMERCIAL

## 2023-10-10 NOTE — TELEPHONE ENCOUNTER
Patient Quality Outreach    Patient is due for the following:   Colon Cancer Screening  Physical Annual Wellness Visit      Topic Date Due    Hepatitis B Vaccine (1 of 3 - 3-dose series) Never done    Flu Vaccine (1) 09/01/2023    COVID-19 Vaccine (4 - 2023-24 season) 09/01/2023       Next Steps:   Schedule a Annual Wellness Visit    Type of outreach:    Sent [employer] message.      Questions for provider review:    None           Marissa Vidal MA

## 2023-11-17 NOTE — PROGRESS NOTES
"Jeanne Wasserman is a 45 year old female who is being evaluated via a billable telephone visit.      The patient has been notified of following:     \"This video visit will be conducted via a call between you and your physician/provider. We have found that certain health care needs can be provided without the need for a physical exam.  This service lets us provide the care you need with a short phone conversation.  If a prescription is necessary we can send it directly to your pharmacy.  If lab work is needed we can place an order for that and you can then stop by our lab to have the test done at a later time.    Video visits are billed at different rates depending on your insurance coverage. During this emergency period, for some insurers they may be billed the same as an in-person visit.  Please reach out to your insurance provider with any questions.    If during the course of the call the physician/provider feels a video visit is not appropriate, you will not be charged for this service.\"    Patient has given verbal consent for Video visit?  Yes    What phone number would you like to be contacted at? [phone]    How would you like to obtain your AVS? MyChart    Subjective     Jeanne Wasserman is a 45 year old female who presents via phone visit today for the following health issues:    HPI      Chief Complaint   Patient presents with     Recheck Medication     Patient stating that she [place], not realizing that it was a controlled medication.        Medication Followup of Phentermine    Taking Medication as prescribed: yes    Side Effects:  None    Medication Helping Symptoms:  Yes- weight yesterday was 211-214    Joined my [employer]  Classes available virtually  Is doing Noom - similar to weight stern           Review of Systems   Constitutional, HEENT, cardiovascular, pulmonary, gi and gu systems are negative, except as otherwise noted.       Objective   Vitals - " "Patient Reported  Weight (Patient Reported): 95.7 kg (211 lb)  Height (Patient Reported): 168.9 cm (5' 6.5\")  BMI (Based on Pt Reported Ht/Wt): 33.55      Vitals:  No vitals were obtained today due to virtual visit.    healthy, alert and no distress  PSYCH: Alert and oriented times 3; coherent speech, normal   rate and volume, able to articulate logical thoughts, able   to abstract reason, no tangential thoughts, no hallucinations   or delusions  Her affect is normal and pleasant  RESP: No cough, no audible wheezing, able to talk in full sentences  Remainder of exam unable to be completed due to telephone visits          Assessment/Plan:    Assessment & Plan     1. Class 1 obesity without serious comorbidity with body mass index (BMI) of 33.0 to 33.9 in adult, unspecified obesity type    2. Cervical radiculopathy    3. Neck pain         1) Continue phentermine. New weight loss goal discussed: 10 pounds over the next 3 months.     2,3) Topiramate renewed. She has been using this once daily and will increase to BID dosing.        BMI:   Estimated body mass index is 39.9 kg/m  as calculated from the following:    Height as of 11/3/20: 1.691 m (5' 6.58\").    Weight as of 1/31/20: 114.1 kg (251 lb 9.6 oz).        Return in about 3 months (around 2/17/2021) for weight follow up.    Anh Cortez PA-C  [facility]    Phone call duration:  12 minutes using [employer]              " <-- Click to add NO pertinent Past Medical History

## 2023-12-03 DIAGNOSIS — E66.811 CLASS 1 OBESITY WITHOUT SERIOUS COMORBIDITY WITH BODY MASS INDEX (BMI) OF 33.0 TO 33.9 IN ADULT, UNSPECIFIED OBESITY TYPE: ICD-10-CM

## 2023-12-03 DIAGNOSIS — E66.9 OBESITY (BMI 30-39.9): ICD-10-CM

## 2023-12-05 RX ORDER — PHENTERMINE HYDROCHLORIDE 30 MG/1
30 CAPSULE ORAL EVERY MORNING
Qty: 30 CAPSULE | Refills: 0 | Status: SHIPPED | OUTPATIENT
Start: 2023-12-05 | End: 2024-01-12

## 2023-12-14 ENCOUNTER — OFFICE VISIT (OUTPATIENT)
Dept: ORTHOPEDICS | Facility: CLINIC | Age: 48
End: 2023-12-14
Payer: COMMERCIAL

## 2023-12-14 ENCOUNTER — ANCILLARY PROCEDURE (OUTPATIENT)
Dept: GENERAL RADIOLOGY | Facility: CLINIC | Age: 48
End: 2023-12-14
Attending: FAMILY MEDICINE
Payer: COMMERCIAL

## 2023-12-14 DIAGNOSIS — M25.572 LEFT ANKLE PAIN: ICD-10-CM

## 2023-12-14 DIAGNOSIS — M25.511 RIGHT SHOULDER PAIN: ICD-10-CM

## 2023-12-14 DIAGNOSIS — G89.29 CHRONIC PAIN OF LEFT ANKLE: ICD-10-CM

## 2023-12-14 DIAGNOSIS — M25.572 CHRONIC PAIN OF LEFT ANKLE: ICD-10-CM

## 2023-12-14 DIAGNOSIS — M25.511 CHRONIC RIGHT SHOULDER PAIN: Primary | ICD-10-CM

## 2023-12-14 DIAGNOSIS — G89.29 CHRONIC RIGHT SHOULDER PAIN: Primary | ICD-10-CM

## 2023-12-14 PROCEDURE — 20611 DRAIN/INJ JOINT/BURSA W/US: CPT | Mod: RT | Performed by: FAMILY MEDICINE

## 2023-12-14 PROCEDURE — 73030 X-RAY EXAM OF SHOULDER: CPT | Mod: TC | Performed by: RADIOLOGY

## 2023-12-14 PROCEDURE — 20606 DRAIN/INJ JOINT/BURSA W/US: CPT | Mod: LT | Performed by: FAMILY MEDICINE

## 2023-12-14 PROCEDURE — 73610 X-RAY EXAM OF ANKLE: CPT | Mod: TC | Performed by: RADIOLOGY

## 2023-12-14 PROCEDURE — 99214 OFFICE O/P EST MOD 30 MIN: CPT | Mod: 25 | Performed by: FAMILY MEDICINE

## 2023-12-14 RX ORDER — ROPIVACAINE HYDROCHLORIDE 5 MG/ML
2 INJECTION, SOLUTION EPIDURAL; INFILTRATION; PERINEURAL
Status: SHIPPED | OUTPATIENT
Start: 2023-12-14

## 2023-12-14 RX ORDER — BETAMETHASONE SODIUM PHOSPHATE AND BETAMETHASONE ACETATE 3; 3 MG/ML; MG/ML
6 INJECTION, SUSPENSION INTRA-ARTICULAR; INTRALESIONAL; INTRAMUSCULAR; SOFT TISSUE
Status: SHIPPED | OUTPATIENT
Start: 2023-12-14

## 2023-12-14 RX ORDER — ROPIVACAINE HYDROCHLORIDE 5 MG/ML
4 INJECTION, SOLUTION EPIDURAL; INFILTRATION; PERINEURAL
Status: SHIPPED | OUTPATIENT
Start: 2023-12-14

## 2023-12-14 RX ADMIN — BETAMETHASONE SODIUM PHOSPHATE AND BETAMETHASONE ACETATE 6 MG: 3; 3 INJECTION, SUSPENSION INTRA-ARTICULAR; INTRALESIONAL; INTRAMUSCULAR; SOFT TISSUE at 16:22

## 2023-12-14 RX ADMIN — ROPIVACAINE HYDROCHLORIDE 4 ML: 5 INJECTION, SOLUTION EPIDURAL; INFILTRATION; PERINEURAL at 16:22

## 2023-12-14 RX ADMIN — ROPIVACAINE HYDROCHLORIDE 2 ML: 5 INJECTION, SOLUTION EPIDURAL; INFILTRATION; PERINEURAL at 16:23

## 2023-12-14 RX ADMIN — BETAMETHASONE SODIUM PHOSPHATE AND BETAMETHASONE ACETATE 6 MG: 3; 3 INJECTION, SUSPENSION INTRA-ARTICULAR; INTRALESIONAL; INTRAMUSCULAR; SOFT TISSUE at 16:23
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
## 2023-12-14 NOTE — PATIENT INSTRUCTIONS
# Chronic right shoulder and left ankle pain: Jeanne Wasserman  was seen today for right shoulder and left ankle pain. Symptoms had been going on for several years, worsening over the past few months. On examination there are positive findings of tenderness palpation over the rotator cuff tendon, pain and weakness with rotator cuff testing, tenderness to palpation over the left posterior tibialis tendon. Imaging findings showed right shoulder arthritis, mild left ankle arthritis. Likely cause of patient's condition due to irritated rotator cuff tendon, irritated posterior tibialis tendon. Other possible conditions contributing to symptoms include right shoulder arthritis.  Counseled patient on nature of condition and treatment options.  Given this plan as below, follow-up 3 to 4 weeks if no improvement, sooner if worsening     Image Findings: Right shoulder arthritis, mild left ankle arthritis  Treatment: Activities as tolerated, home exercises given today  Job: As tolerated   Medications/Injections: Limited tylenol/ibuprofen for pain for 1-2 weeks, Topical Voltaren gel, right subacromial, left posterior tibialis tendon steroid injections  Follow-up: In one month if symptoms do not improve, sooner if worsening  Can consider right shoulder joint steroid injection    Please call [phone]   Ask for my team if you have any questions or concerns    If you have not yet received the influenza vaccine but would like to get one, please call  [phone] or you can schedule via [employer]    It was great seeing you again today!    Tye Cam MD, [facility] Injection Discharge Instructions    Procedure: right subacromial bursa and left posterior tibialis tendon steroid injection     You may shower, however avoid swimming, tub baths or hot tubs for 24 hours following your procedure  You may have a mild to moderate increase in pain for several days following the injection.  It may take up to 14 days for the  steroid medication to start working although you may feel the effect as early as a few days after the procedure.  You may use ice packs for 10-15 minutes, 3 to 4 times a day at the injection site for comfort  You may use anti-inflammatory medications (such as Ibuprofen or Aleve or Advil) or Tylenol for pain control if necessary  If you were fasting, you may resume your normal diet and medications after the procedure  If you have diabetes, check your blood sugar more frequently than usual as your blood sugar may be higher than normal for 10-14 days following a steroid injection. Contact your doctor who manages your diabetes if your blood sugar is higher than usual    If you experience any of the following, call [facility] @ [phone] or [phone]  -Fever over 100 degree F  -Swelling, bleeding, redness, drainage, warmth at the injection site  - New or worsening pain
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
## 2023-12-27 ENCOUNTER — NURSE TRIAGE (OUTPATIENT)
Dept: FAMILY MEDICINE | Facility: CLINIC | Age: 48
End: 2023-12-27
Payer: COMMERCIAL

## 2023-12-27 DIAGNOSIS — U07.1 INFECTION DUE TO 2019 NOVEL CORONAVIRUS: Primary | ICD-10-CM

## 2023-12-27 NOTE — TELEPHONE ENCOUNTER
Routing to COVID hub    Patient calling    She tested positive for covid today.    Sx started on 12/25    No chest pain, SOB or difficulty breathing    Nadia Holden RN

## 2023-12-27 NOTE — TELEPHONE ENCOUNTER
S: RN triaged and ran protocol for pt regarding covid treatment. Pt verbalized they are currently taking estradiol for estrogen therapy. Standing order has protocol for birth control guidance but not hormone therapy.     B:Pt prescribed Paxlovid for covid treatment. Advised to stop taking estrogen therapy due to possible complication      A: RN unsure of when pt could restart estrogen therapy.      R: Could PCP provide pt with recommendation on when hormone therapy could be restarted after taking Paxlovid?    RN routing to PCP.         RN COVID TREATMENT VISIT  12/27/23      The patient has been triaged and does not require a higher level of care.    Jeanne Wasserman  48 year old  Current weight? 200 lbs    Has the patient been seen by a primary care provider at an [facility] or [facility] within the past two years? Yes.   Have you been in close proximity to/do you have a known exposure to a person with a confirmed case of influenza? No.     General treatment eligibility:  Date of positive COVID test (PCR or at home)?  12-27-23    Are you or have you been hospitalized for this COVID-19 infection? No.   Have you received monoclonal antibodies or antiviral treatment for COVID-19 since this positive test? No.   Do you have any of the following conditions that place you at risk of being very sick from COVID-19?   - Mental health disorders including mood disorders, depression, schizophrenia spectrum disorders   - Overweight or Obesity (BMI >85th percentile or BMI 25 or higher)  Yes, patient has at least one high risk condition as noted above.     Current COVID symptoms:   - fever or chills  - cough  - fatigue  - muscle or body aches  - headache  - congestion or runny nose  Yes. Patient has at least one symptom as selected.     How many days since symptoms started? 5 days or less. Established patient, 12 years or older weighing at least 88.2 lbs, who has symptoms that started in the past 5  days, has not been hospitalized nor received treatment already, and is at risk for being very sick from COVID-19.     Treatment eligibility by RN:  Are you currently pregnant or nursing? No  Do you have a clinically significant hypersensitivity to nirmatrelvir or ritonavir, or toxic epidermal necrolysis (TEN) or Paul-Kar Syndrome? No  Do you have a history of hepatitis, any hepatic impairment on the Problem List (such as Child-Ashley Class C, cirrhosis, fatty liver disease, alcoholic liver disease), or was the last liver lab (hepatic panel, ALT, AST, ALK Phos, bilirubin) elevated in the past 6 months? No  Do you have any history of severe renal impairment (eGFR < 30mL/min)? No    Is patient eligible to continue? Yes, patient meets all eligibility requirements for the RN COVID treatment (as denoted by all no responses above).     Current Outpatient Medications   Medication Sig Dispense Refill    acetaminophen-caffeine (EXCEDRIN TENSION HEADACHE) 500-65 MG TABS Take 2 tablets by mouth as needed for mild pain      cyanocobalamin (CYANOCOBALAMIN) 1000 MCG/ML injection Inject 1 mL (1,000 mcg) into the muscle every 4 months 1 mL 2    FLUoxetine (PROZAC) 20 MG capsule Take 1 capsule (20 mg) by mouth daily Office visit is due prior to further refills 90 capsule 1    multivitamin w/minerals (THERA-VIT-M) tablet Take 1 tablet by mouth daily      phentermine (ADIPEX-P) 30 MG capsule TAKE 1 CAPSULE(30 MG) BY MOUTH EVERY MORNING 30 capsule 0    topiramate (TOPAMAX) 50 MG tablet Take 1 tablet (50 mg) by mouth 2 times daily - DUE FOR ANNUAL FOLLOW  tablet 1       Medications from List 1 of the standing order (on medications that exclude the use of Paxlovid) that patient is taking: NONE. Is patient taking La Yuca's Wort? No  Is patient taking Macrina's Wort or any meds from List 1? No.   Medications from List 2 of the standing order (on meds that provider needs to adjust) that patient is taking: NONE. Is patient on any  "of the meds from List 2? No.   Medications from List 3 of standing order (on meds that a RN needs to adjust) that patient is taking:         ethinyl estradiol/ethinyl estradiol containing meds (most common - Apri,Aviane, Ortho Cyclen, Ortho Tri Cyclen, Sprintec, Tri-Sprintec, Cryselle): Instructed patient to use a back-up method of birth control or abstain from intercourse while on Paxlovid until both one month after completion of Paxlovid and a new pill pack starts.  Is patient on any meds from List 3? Yes. Patient is on meds from list 3. No meds require a provider visit and at least one med required RN to adjust.     Paxlovid has an approximate 90% reduction in hospitalization. Paxlovid can possibly cause altered sense of taste, diarrhea (loose, watery stools), high blood pressure, muscle aches.     Would patient like a Paxlovid prescription?   Yes.   No results found for: \"GFRESTIMATED\"    Was last eGFR reduced? No, eGFR 60 or greater/ No Result on record. Patient can receive the normal renal function dose. Paxlovid Rx sent to [facility]   Preferred    Temporary change to home medications: None    All medication adjustments (holds, etc) were discussed with the patient and patient was asked to repeat back (teachback) their med adjustment.  Did patient understand med adjustment? Yes, patient repeated back and understood correctly.        Reviewed the following instructions with the patient:    Paxlovid (nimatrelvir and ritonavir)    How it works  Two medicines (nirmatrelvir and ritonavir) are taken together. They stop the virus from growing. Less amount of virus is easier for your body to fight.    How to take  Medicine comes in a daily container with both medicine tablets. Take by mouth twice daily (once in the morning, once at night) for 5 days.  The number of tablets to take varies by patient.  Don't chew or break capsules. Swallow whole.    When to take  Take as soon as possible after positive COVID-19 " test result, and within 5 days of your first symptoms.    Possible side effects  Can cause altered sense of taste, diarrhea (loose, watery stools), high blood pressure, muscle aches.    Rosaura Sanders RN       Additional Information   Negative: SEVERE difficulty breathing (e.g., struggling for each breath, speaks in single words)   Negative: Difficult to awaken or acting confused (e.g., disoriented, slurred speech)   Negative: Bluish (or gray) lips or face now   Negative: Shock suspected (e.g., cold/pale/clammy skin, too weak to stand, low BP, rapid pulse)   Negative: Sounds like a life-threatening emergency to the triager   Negative: SEVERE or constant chest pain or pressure  (Exception: Mild central chest pain, present only when coughing.)   Negative: MODERATE difficulty breathing (e.g., speaks in phrases, SOB even at rest, pulse 100-120)   Negative: Headache and stiff neck (can't touch chin to chest)   Negative: Oxygen level (e.g., pulse oximetry) 90% or lower   Negative: Chest pain or pressure  (Exception: MILD central chest pain, present only when coughing.)   Negative: Drinking very little and dehydration suspected (e.g., no urine > 12 hours, very dry mouth, very lightheaded)   Negative: Patient sounds very sick or weak to the triager    Protocols used: Coronavirus (COVID-19) Diagnosed or [id number]

## 2023-12-28 ENCOUNTER — TELEPHONE (OUTPATIENT)
Dept: FAMILY MEDICINE | Facility: CLINIC | Age: 48
End: 2023-12-28
Payer: COMMERCIAL

## 2023-12-28 NOTE — TELEPHONE ENCOUNTER
She can take ibuprofen 400 mg up to 3 times a day  She can still keep taking Tylenol and if the headache does not get better with that she can take ibuprofen in between Tylenol

## 2023-12-28 NOTE — TELEPHONE ENCOUNTER
RN relayed provider message. Pt verbalized understanding and will try alternating tylenol and ibuprofen.     Fiordaliza Maloney RN

## 2023-12-28 NOTE — TELEPHONE ENCOUNTER
Pt calling to let the provider know that she will be stopping Paxlovid.  Pt got medication yestrday.    States that it is making her feel worse that before and it making her vomit.     States that she is not having any SOB.    Pt states that she has a headache that does not go away with tylenol.  States that she has had it for 1-2 days.  Pt does state that she does get migraines.   Pt has trouble sleeping at night because of the headache.     Pt wants to know if there is anything that the provider advises for the headache.    Chante Lozoya RN  [facility]

## 2024-01-11 ENCOUNTER — MYC MEDICAL ADVICE (OUTPATIENT)
Dept: FAMILY MEDICINE | Facility: CLINIC | Age: 49
End: 2024-01-11
Payer: COMMERCIAL

## 2024-01-12 ENCOUNTER — VIRTUAL VISIT (OUTPATIENT)
Dept: FAMILY MEDICINE | Facility: CLINIC | Age: 49
End: 2024-01-12
Payer: COMMERCIAL

## 2024-01-12 ENCOUNTER — TELEPHONE (OUTPATIENT)
Dept: FAMILY MEDICINE | Facility: CLINIC | Age: 49
End: 2024-01-12

## 2024-01-12 DIAGNOSIS — R05.1 ACUTE COUGH: Primary | ICD-10-CM

## 2024-01-12 PROCEDURE — 99213 OFFICE O/P EST LOW 20 MIN: CPT | Mod: 95 | Performed by: FAMILY MEDICINE

## 2024-01-12 RX ORDER — ANTIARTHRITIC COMBINATION NO.2 900 MG
50 TABLET ORAL DAILY
COMMUNITY

## 2024-01-12 RX ORDER — ESTRADIOL 1 MG/1
1 TABLET ORAL DAILY
COMMUNITY

## 2024-01-12 RX ORDER — ALBUTEROL SULFATE 90 UG/1
AEROSOL, METERED RESPIRATORY (INHALATION)
COMMUNITY
Start: 2023-01-10

## 2024-01-12 RX ORDER — BENZONATATE 200 MG/1
CAPSULE ORAL
COMMUNITY
Start: 2023-01-10

## 2024-01-12 NOTE — PROGRESS NOTES
Jeanne is a 48 year old who is being evaluated via a billable video visit.      How would you like to obtain your AVS? [employer]  If the video visit is dropped, the invitation should be resent by: Text to cell phone: [phone]  Will anyone else be joining your video visit? No          A/P:      ICD-10-CM    1. Acute cough  R05.1         Pt with persistent cough, requesting zpack.  Attempted to discuss with pt that antibiotics are only indicated in there is evidence of bacterial infection/pneumonia.  Pt then stated that a zpack was the only thing that worked last year and she knew her body enough to know what would work.    Attempted to offer pt chest x-ray and in clinic eval today but she became angry and was speaking/yelling over me and I was unable to communicate options for a plan moving forward.  Pt disconnected the call.    [employer] message sent offering pt CXR if she wishes to pursue and additional options for UC or ER eval if needed.    Subjective   Jeanne is a 48 year old, presenting for the following health issues:  Cough      HPI       COVID-19 Symptom Review  How many days ago did these symptoms start? Covid positive 12/25/23    Are any of the following symptoms significant for you?  New or worsening difficulty breathing? No  Worsening cough? Yes, it's a dry cough.   Fever or chills? No  Headache: No  Sore throat: No  Chest pain: YES- burning  Diarrhea: No  Body aches? YES  Fatigue     What treatments has patient tried? Guaifenesin (mucinex), albuterol inhaler, cough drops, tessalon    Does patient live in a nursing home, group home, or shelter? No  Does patient have a way to get food/medications during quarantined? Yes, I have a friend or family member who can help me.     Was covid positive on 12/25.  Was given paxlovid which she stopped as it made her ill.    Had a rash that began about 5 days after her positive covid test.  Took benadryl which helped.  Improving but still has some residual rash on  "her chest and face.  Just wants to note this.    Feels like cough is worse.  Taking Mucinex and using old albuterol inhaler and tessalon which she has not found helpful.    Feels like her chest is burning and there is a \"rattling\" when she breathes.  Cough is dry.  Concerned that her cough is not productive.  Cough is keeping her up at night and she is having coughing episodes that are causing incontinence.  No fever.    Had a similar cough last year and was given a zpack which was \"the only thing that helped\"    Review of Systems         Objective           Vitals:  No vitals were obtained today due to virtual visit.    Physical Exam   GENERAL: alert and no distress  EYES: Eyes grossly normal to inspection.  No discharge or erythema, or obvious scleral/conjunctival abnormalities.  RESP: No audible wheeze, cough, or visible cyanosis.  No visible retractions or increased work of breathing.    SKIN: Visible skin clear. No significant rash, abnormal pigmentation or lesions.  NEURO: Cranial nerves grossly intact.  Mentation and speech appropriate for age.  PSYCH: Mentation appears normal, judgement and insight intact, normal speech and appearance well-groomed.    Epic reviewed            Video-Visit Details    Type of service:  Video Visit     Originating Location (pt. Location): Home    Distant Location (provider location):  On-site  Platform used for Video Visit: Garima    "

## 2024-03-14 DIAGNOSIS — M54.2 NECK PAIN: ICD-10-CM

## 2024-03-14 DIAGNOSIS — M54.12 CERVICAL RADICULOPATHY: ICD-10-CM

## 2024-03-14 RX ORDER — TOPIRAMATE 50 MG/1
TABLET, FILM COATED ORAL
Qty: 180 TABLET | Refills: 1 | OUTPATIENT
Start: 2024-03-14

## 2024-04-08 ENCOUNTER — VIRTUAL VISIT (OUTPATIENT)
Dept: FAMILY MEDICINE | Facility: CLINIC | Age: 49
End: 2024-04-08
Payer: COMMERCIAL

## 2024-04-08 ENCOUNTER — MYC MEDICAL ADVICE (OUTPATIENT)
Dept: FAMILY MEDICINE | Facility: CLINIC | Age: 49
End: 2024-04-08

## 2024-04-08 DIAGNOSIS — M54.12 CERVICAL RADICULOPATHY: ICD-10-CM

## 2024-04-08 DIAGNOSIS — F33.8 SEASONAL AFFECTIVE DISORDER (H): ICD-10-CM

## 2024-04-08 DIAGNOSIS — E66.9 OBESITY (BMI 30-39.9): ICD-10-CM

## 2024-04-08 DIAGNOSIS — M54.2 NECK PAIN: Primary | ICD-10-CM

## 2024-04-08 PROCEDURE — 99214 OFFICE O/P EST MOD 30 MIN: CPT | Mod: 95 | Performed by: INTERNAL MEDICINE

## 2024-04-08 RX ORDER — PHENTERMINE HYDROCHLORIDE 15 MG/1
15 CAPSULE ORAL EVERY MORNING
Qty: 90 CAPSULE | Refills: 0 | Status: SHIPPED | OUTPATIENT
Start: 2024-04-08 | End: 2024-04-09

## 2024-04-08 RX ORDER — TOPIRAMATE 50 MG/1
50 TABLET, FILM COATED ORAL 2 TIMES DAILY
Qty: 180 TABLET | Refills: 1 | Status: SHIPPED | OUTPATIENT
Start: 2024-04-08

## 2024-04-08 ASSESSMENT — PATIENT HEALTH QUESTIONNAIRE - PHQ9
SUM OF ALL RESPONSES TO PHQ QUESTIONS 1-9: 0
SUM OF ALL RESPONSES TO PHQ QUESTIONS 1-9: 0
10. IF YOU CHECKED OFF ANY PROBLEMS, HOW DIFFICULT HAVE THESE PROBLEMS MADE IT FOR YOU TO DO YOUR WORK, TAKE CARE OF THINGS AT HOME, OR GET ALONG WITH OTHER PEOPLE: NOT DIFFICULT AT ALL
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
## 2024-04-09 ENCOUNTER — MYC MEDICAL ADVICE (OUTPATIENT)
Dept: FAMILY MEDICINE | Facility: CLINIC | Age: 49
End: 2024-04-09
Payer: COMMERCIAL

## 2024-04-09 ENCOUNTER — TELEPHONE (OUTPATIENT)
Dept: FAMILY MEDICINE | Facility: CLINIC | Age: 49
End: 2024-04-09
Payer: COMMERCIAL

## 2024-04-09 DIAGNOSIS — E66.9 OBESITY (BMI 30-39.9): ICD-10-CM

## 2024-04-09 RX ORDER — PHENTERMINE HYDROCHLORIDE 30 MG/1
30 CAPSULE ORAL EVERY MORNING
Qty: 90 CAPSULE | Refills: 0 | Status: SHIPPED | OUTPATIENT
Start: 2024-04-09

## 2024-04-09 RX ORDER — PHENTERMINE HYDROCHLORIDE 15 MG/1
15 CAPSULE ORAL EVERY MORNING
Qty: 90 CAPSULE | Refills: 0 | Status: CANCELLED | OUTPATIENT
Start: 2024-04-09

## 2024-04-09 NOTE — TELEPHONE ENCOUNTER
Writer notes medication in MAR is 15mg. Routing to ordering provider to review and advise. Med and pharmacy pended.    Nikko Melendrez RN  [facility]

## 2024-04-09 NOTE — TELEPHONE ENCOUNTER
"Prior Authorization Retail Medication Request    Medication/Dose: Phentermine 15 MG capsule  Diagnosis and ICD code (if different than what is on RX):     New/renewal/insurance change PA/secondary ins. PA:  Previously Tried and Failed:     Rationale:       Insurance   Primary: blue plus  Insurance ID:  [id number]     Secondary (if applicable):   Insurance ID:       Pharmacy Information (if different than what is on RX)  Name:  [facility]  Phone:  [phone]  Fax: [phone]    A Prior Authorization has been started.  To submit the PA, please follow the instructions below:    Login to [email or web address] and \"enter a Key\"  KEY: [id number]      "

## 2024-04-22 NOTE — TELEPHONE ENCOUNTER
Central Prior Authorization Team   Phone: [phone]    PA Initiation    Medication: Phentermine 15 MG capsule  Insurance Company: [employer] Non-Specialty PA's - Phone [phone] Fax [phone]  Pharmacy Filling the Rx: [id number] - [place] [id number] [facility]  AT [facility]  Filling Pharmacy Phone: [phone]  Filling Pharmacy Fax:    Start Date: 4/22/2024

## 2024-04-22 NOTE — TELEPHONE ENCOUNTER
Prior Authorization Approval    Authorization Effective Date: 4/22/2024  Authorization Expiration Date: 7/22/2024  Medication: Phentermine 15 MG capsule  Approved Dose/Quantity:    Reference #:     Insurance Company: [employer] Non-Specialty PA's - Phone [phone] Fax [phone]  Expected CoPay:       CoPay Card Available:      Foundation Assistance Needed:    Which Pharmacy is filling the prescription (Not needed for infusion/clinic administered): [id number] [address]  AT [facility]  Pharmacy Notified:  Yes  Patient Notified:  **Instructed pharmacy to notify patient when script is ready to /ship.**

## 2024-05-14 NOTE — TELEPHONE ENCOUNTER
Patient Quality Outreach    Patient is due for the following:   Colon Cancer Screening  Physical Preventive Adult Physical      Topic Date Due    Hepatitis B Vaccine (1 of 3 - 19+ 3-dose series) Never done    COVID-19 Vaccine (4 - 2023-24 season) 09/01/2023       Next Steps:   Schedule a Adult Preventative    Type of outreach:    Sent [employer] message.      Questions for provider review:    None           Magaly Ralph

## 2024-05-26 ENCOUNTER — HEALTH MAINTENANCE LETTER (OUTPATIENT)
Age: 49
End: 2024-05-26

## 2024-06-27 ENCOUNTER — TRANSFERRED RECORDS (OUTPATIENT)
Dept: HEALTH INFORMATION MANAGEMENT | Facility: CLINIC | Age: 49
End: 2024-06-27
Payer: COMMERCIAL

## 2024-07-30 ENCOUNTER — TELEPHONE (OUTPATIENT)
Dept: FAMILY MEDICINE | Facility: CLINIC | Age: 49
End: 2024-07-30
Payer: COMMERCIAL

## 2024-07-30 NOTE — TELEPHONE ENCOUNTER
Patient Quality Outreach    Patient is due for the following:   Colon Cancer Screening  Physical Preventive Adult Physical      Topic Date Due    Hepatitis B Vaccine (1 of 3 - 19+ 3-dose series) Never done    COVID-19 Vaccine (4 - 2023-24 season) 09/01/2023       Next Steps:   Schedule a Adult Preventative    Type of outreach:    Sent [employer] message.      Questions for provider review:    None           Magaly Ralph

## 2024-08-12 ENCOUNTER — VIRTUAL VISIT (OUTPATIENT)
Dept: FAMILY MEDICINE | Facility: CLINIC | Age: 49
End: 2024-08-12
Payer: COMMERCIAL

## 2024-08-12 DIAGNOSIS — E66.812 CLASS 2 OBESITY DUE TO EXCESS CALORIES WITHOUT SERIOUS COMORBIDITY WITH BODY MASS INDEX (BMI) OF 35.0 TO 35.9 IN ADULT: ICD-10-CM

## 2024-08-12 DIAGNOSIS — E66.09 CLASS 2 OBESITY DUE TO EXCESS CALORIES WITHOUT SERIOUS COMORBIDITY WITH BODY MASS INDEX (BMI) OF 35.0 TO 35.9 IN ADULT: ICD-10-CM

## 2024-08-12 DIAGNOSIS — N95.1 MENOPAUSAL SYNDROME (HOT FLASHES): Primary | ICD-10-CM

## 2024-08-12 PROCEDURE — 99214 OFFICE O/P EST MOD 30 MIN: CPT | Mod: 95 | Performed by: INTERNAL MEDICINE

## 2024-08-12 RX ORDER — GABAPENTIN 300 MG/1
300 CAPSULE ORAL AT BEDTIME
Qty: 90 CAPSULE | Refills: 0 | Status: SHIPPED | OUTPATIENT
Start: 2024-08-12 | End: 2024-09-04

## 2024-08-12 RX ORDER — PHENTERMINE HYDROCHLORIDE 37.5 MG/1
37.5 TABLET ORAL
Qty: 30 TABLET | Refills: 2 | Status: SHIPPED | OUTPATIENT
Start: 2024-08-12

## 2024-08-12 NOTE — PROGRESS NOTES
"  If patient has telephone visit, have they been educated on video visit as preferred visit method and offered to change to video visit? no - video visit        Instructions Relayed to Patient by Virtual Roomer:     Patient is active on [employer]:   Relayed following to patient: \"It looks like you are active on [employer], are you able to join the visit this way? If not, do you need us to send you a link now or would you like your provider to send a link via text or email when they are ready to initiate the visit?\"      Patient Confirmed they will join visit via: [employer]  Reminded patient to ensure they were logged on to virtual visit by arrival time listed.   Asked if patient has flexibility to initiate visit sooner than arrival time: patient stated yes, documented in appointment notes availability to initiate visit earlier than arrival time     If pediatric virtual visit, ensured pediatric patient along with parent/guardian will be present for video visit.     Patient offered the website [email or web address] and/or phone number to [employer] Help line: [phone]   Jeanne is a 49 year old who is being evaluated via a billable video visit.    How would you like to obtain your AVS? [employer]  If the video visit is dropped, the invitation should be resent by: Text to cell phone: [phone]  Will anyone else be joining your video visit? No      Assessment & Plan     Menopausal syndrome (hot flashes)  She has been having menopausal symptoms for the last few months  She has tried hormonal therapy without much benefit  She has hot flashes   these are worse at night  We discussed about pharmacotherapy versus nonpharmacologic treatments like black cohosh/evening primrose oil/purified pollen  We also discussed about pharmacotherapy like SNRIs/SSRIs  Discussed about gabapentin and the side effects of the same  We will start her on gabapentin at 300 mg bedtime and we can titrate up slowly the dose if needed  - " gabapentin (NEURONTIN) 300 MG capsule; Take 1 capsule (300 mg) by mouth at bedtime    Class 2 obesity due to excess calories without serious comorbidity with body mass index (BMI) of 35.0 to 35.9 in adult  She is interested in trying Wegovy  She is already taking Adipex but she has plateaued on that   Discussed about the side effects of Wegovy including  nausea/constipation/diarrhea/dizziness  Discussed about rare side effects like pancreatitis/loss of vision  We will slowly start 0.25 mg and titrate the dose upwards  If her insurance does not cover this then we need to look at compounding pharmacies  - Semaglutide-Weight Management (WEGOVY) 0.25 MG/0.5ML pen; Inject 0.25 mg subcutaneously once a week  - phentermine (ADIPEX-P) 37.5 MG tablet; Take 1 tablet (37.5 mg) by mouth every morning (before breakfast)                Charlotte Angel is a 49 year old, presenting for the following health issues:  No chief complaint on file.      Patient would like to go over weight management, also would like blood work done, and stool sample kit    Also Patient would like to note [facility] and [facility] screen and was told that she does not have diabetes and everything looks great    History of Present Illness       Reason for visit:  Weight managment and would like blood work    She eats 0-1 servings of fruits and vegetables daily.She consumes 0 sweetened beverage(s) daily.She exercises with enough effort to increase her heart rate 9 or less minutes per day.  She exercises with enough effort to increase her heart rate 3 or less days per week.   She is taking medications regularly.               Review of Systems  Constitutional, HEENT, cardiovascular, pulmonary, gi and gu systems are negative, except as otherwise noted.      Objective           Vitals:  No vitals were obtained today due to virtual visit.    Physical Exam   GENERAL: alert and no distress  EYES: Eyes grossly normal to  inspection.  No discharge or erythema, or obvious scleral/conjunctival abnormalities.  RESP: No audible wheeze, cough, or visible cyanosis.    SKIN: Visible skin clear. No significant rash, abnormal pigmentation or lesions.  NEURO: Cranial nerves grossly intact.  Mentation and speech appropriate for age.  PSYCH: Appropriate affect, tone, and pace of words          Video-Visit Details    Type of service:  Video Visit   Originating Location (pt. Location): Home    Distant Location (provider location):  Off-site  Platform used for Video Visit: [facility]  Signed Electronically by: Sudneep Warren MD

## 2024-08-31 ENCOUNTER — MYC REFILL (OUTPATIENT)
Dept: FAMILY MEDICINE | Facility: CLINIC | Age: 49
End: 2024-08-31
Payer: COMMERCIAL

## 2024-08-31 DIAGNOSIS — E66.812 CLASS 2 OBESITY DUE TO EXCESS CALORIES WITHOUT SERIOUS COMORBIDITY WITH BODY MASS INDEX (BMI) OF 35.0 TO 35.9 IN ADULT: ICD-10-CM

## 2024-08-31 DIAGNOSIS — E66.09 CLASS 2 OBESITY DUE TO EXCESS CALORIES WITHOUT SERIOUS COMORBIDITY WITH BODY MASS INDEX (BMI) OF 35.0 TO 35.9 IN ADULT: ICD-10-CM

## 2024-09-03 ENCOUNTER — MYC MEDICAL ADVICE (OUTPATIENT)
Dept: FAMILY MEDICINE | Facility: CLINIC | Age: 49
End: 2024-09-03
Payer: COMMERCIAL

## 2024-09-03 DIAGNOSIS — F32.0 CURRENT MILD EPISODE OF MAJOR DEPRESSIVE DISORDER WITHOUT PRIOR EPISODE (H): ICD-10-CM

## 2024-09-25 ENCOUNTER — MYC REFILL (OUTPATIENT)
Dept: FAMILY MEDICINE | Facility: CLINIC | Age: 49
End: 2024-09-25
Payer: COMMERCIAL

## 2024-09-25 DIAGNOSIS — E66.09 CLASS 2 OBESITY DUE TO EXCESS CALORIES WITHOUT SERIOUS COMORBIDITY WITH BODY MASS INDEX (BMI) OF 35.0 TO 35.9 IN ADULT: ICD-10-CM

## 2024-09-25 DIAGNOSIS — E66.812 CLASS 2 OBESITY DUE TO EXCESS CALORIES WITHOUT SERIOUS COMORBIDITY WITH BODY MASS INDEX (BMI) OF 35.0 TO 35.9 IN ADULT: ICD-10-CM

## 2024-10-30 ENCOUNTER — MYC MEDICAL ADVICE (OUTPATIENT)
Dept: FAMILY MEDICINE | Facility: CLINIC | Age: 49
End: 2024-10-30
Payer: COMMERCIAL

## 2024-10-30 DIAGNOSIS — E66.09 CLASS 2 OBESITY DUE TO EXCESS CALORIES WITHOUT SERIOUS COMORBIDITY WITH BODY MASS INDEX (BMI) OF 35.0 TO 35.9 IN ADULT: Primary | ICD-10-CM

## 2024-10-30 DIAGNOSIS — E66.812 CLASS 2 OBESITY DUE TO EXCESS CALORIES WITHOUT SERIOUS COMORBIDITY WITH BODY MASS INDEX (BMI) OF 35.0 TO 35.9 IN ADULT: Primary | ICD-10-CM

## 2024-10-30 NOTE — TELEPHONE ENCOUNTER
See MyChart encounter below. Routing to provider to review and advise.    Patient doing well on 1 mg dose Wegovy, requesting next higher dose if able. If patient needs appt for this, just let us know and we can help schedule. 1.7 mg dose pended if appropriate. Thank you!      Latosha Alexandra RN, BSN  [facility]

## 2024-11-03 DIAGNOSIS — M54.2 NECK PAIN: ICD-10-CM

## 2024-11-04 RX ORDER — TOPIRAMATE 50 MG/1
50 TABLET, FILM COATED ORAL 2 TIMES DAILY
Qty: 180 TABLET | Refills: 1 | OUTPATIENT
Start: 2024-11-04

## 2024-11-05 ENCOUNTER — TELEPHONE (OUTPATIENT)
Dept: FAMILY MEDICINE | Facility: CLINIC | Age: 49
End: 2024-11-05
Payer: COMMERCIAL

## 2024-11-05 RX ORDER — TOPIRAMATE 50 MG/1
50 TABLET, FILM COATED ORAL 2 TIMES DAILY
Qty: 180 TABLET | Refills: 1 | Status: SHIPPED | OUTPATIENT
Start: 2024-11-05

## 2024-11-05 NOTE — TELEPHONE ENCOUNTER
Patient Quality Outreach    Patient is due for the following:   Colon Cancer Screening  Depression  -  PHQ-9 needed  Physical Preventive Adult Physical      Topic Date Due    Hepatitis B Vaccine (1 of 3 - 19+ 3-dose series) Never done    Flu Vaccine (1) 09/01/2024    COVID-19 Vaccine (4 - 2024-25 season) 09/01/2024       Next Steps:   Schedule a Adult Preventative    Type of outreach:    Sent [employer] message.      Questions for provider review:    None           Magaly Ralph

## 2024-12-12 ENCOUNTER — OFFICE VISIT (OUTPATIENT)
Dept: ORTHOPEDICS | Facility: CLINIC | Age: 49
End: 2024-12-12
Payer: COMMERCIAL

## 2024-12-12 DIAGNOSIS — G89.29 CHRONIC FOOT PAIN, RIGHT: Primary | ICD-10-CM

## 2024-12-12 DIAGNOSIS — M79.671 CHRONIC FOOT PAIN, RIGHT: Primary | ICD-10-CM

## 2024-12-12 DIAGNOSIS — G89.29 CHRONIC PAIN OF RIGHT ANKLE: ICD-10-CM

## 2024-12-12 DIAGNOSIS — M25.571 CHRONIC PAIN OF RIGHT ANKLE: ICD-10-CM

## 2024-12-12 PROCEDURE — 99214 OFFICE O/P EST MOD 30 MIN: CPT | Mod: 25 | Performed by: FAMILY MEDICINE

## 2024-12-12 PROCEDURE — 20611 DRAIN/INJ JOINT/BURSA W/US: CPT | Mod: RT | Performed by: FAMILY MEDICINE

## 2024-12-12 RX ADMIN — BETAMETHASONE SODIUM PHOSPHATE AND BETAMETHASONE ACETATE 6 MG: 3; 3 INJECTION, SUSPENSION INTRA-ARTICULAR; INTRALESIONAL; INTRAMUSCULAR; SOFT TISSUE at 17:49

## 2024-12-12 RX ADMIN — ROPIVACAINE HYDROCHLORIDE 4 ML: 5 INJECTION, SOLUTION EPIDURAL; INFILTRATION; PERINEURAL at 17:49

## 2024-12-12 NOTE — LETTER
12/12/2024      Jeanne Wasserman  [address]  [facility] [address]      Dear Colleague,    Thank you for referring your patient, Jeanne Wasserman, to the [facility]. Please see a copy of my visit note below.    ASSESSMENT & PLAN    Jeanne was seen today for pain.    Diagnoses and all orders for this visit:    Chronic foot pain, right  -     XR Foot Right G/E 3 Views; Future  -     Large Joint Injection/Arthocentesis: R subacromial bursa    Chronic pain of right ankle  -     Cancel: XR Ankle RT G/E 3 vw; Future      This issue is acute on chronic and Worsening.    # Acute on Chronic Right Shoulder and Foot Pain: Jeanne Wasserman  was seen today for right shoulder and foot pain. Symptoms had been going on for years, worsening over the past month in right foot and shoulder. On examination there are positive findings of tenderness to palpation over the rotator cuff tendon, lateral foot. Imaging findings showed mild foot arthritis, no fracture. Likely cause of patient's condition due to flare of midfoot mild arthritis, irritated rotator cuff tendon.  Counseled patient on nature of condition and treatment options.  Given this plan as below, follow-up 1 mon as needed.     Image Findings: mild foot arthritis  Treatment: Activities as tolerated, home exercises given today  Job: As tolerated  Medications/Injections: Limited tylenol/ibuprofen for pain for 1-2 weeks, Topical Voltaren gel, right subacromial bursa steroid injection   Follow-up: In one month if symptoms do not improve, sooner if worsening  Can consider foot steroid injection      Tye Cam MD  [facility]    I was present with the resident during the history and exam.  I discussed the case with the resident and agree with the findings as documented in the assessment and plan.       -----  Chief Complaint   Patient presents with     Right Foot - Pain        SUBJECTIVE  Jeanne Wasserman is a/an 49 year old female who is seen as a self referral for evaluation of right foot pain.     The patient is seen by themselves.      Onset: 1 month(s) ago. Reports insidious onset without acute precipitating event.  Location of Pain: right lateral foot   Worsened by: walking, unsupportive shoes, prolonged sitting, night pain   Better with: vibration plate, supportive shoes, stretching   Treatments tried: vibration plate, supportive shoes, stretching   Associated symptoms: popping     Orthopedic/Surgical history: YES - similar symptoms last year on the left side   Social History/Occupation: Desk work     Interim History - December 12, 2024  Since last visit on 12/14/23 patient has returned right shoulder pain over the last month.  Now radiating to upper trap.   Lumbar support for chair at work. No interim injury.       No family history pertinent to patient's problem today.      REVIEW OF SYSTEMS:  Review of Systems  Constitutional, HEENT, cardiovascular, pulmonary, gi and gu systems are negative, except as otherwise noted.    OBJECTIVE:  LMP 07/04/2019 (Approximate)    General: healthy, alert and in no distress  HEENT: no scleral icterus or conjunctival erythema  Skin: no suspicious lesions or rash. No jaundice.  CV: distal perfusion intact    Resp: normal respiratory effort without conversational dyspnea   Psych: normal mood and affect  Gait: normal steady gait with appropriate coordination and balance    Neuro: Normal light sensory exam of right upper extremity     Ortho Exam   RIGHT SHOULDER  Inspection:    no swelling, bruising, discoloration, or obvious deformity or asymmetry  Palpation:    Tender about the supraspinatus insertion. Remainder of bony and tendinous landmarks are nontender.  Active Range of Motion:     Abduction 1650, FF 1650, , IR L4.    Strength:    Scapular plane abduction 5/55/5,  ER 5/5, IR 5/5, biceps , triceps 5/5  Special Tests:    Positive:  Neer's, Mattson', and supraspinatus (empty can)       CERVICAL SPINE  Inspection:    normal cervical lordosis present, rounded shoulders, forward head posture  Palpation:    Nontender.  Range of Motion:     Flexion full    Extension full    Right side bend full    Left side bend full    Right rotation full    Left rotation full  Strength:    Full strength throughout all neck muscles  Special Tests:    Positive: None    Negative: Spurling's (bilateral)    RADIOLOGY:  I independently, visualized and reviewed these images with the patient    Mild right glenohumeral arthritis      Review of external notes as documented elsewhere in note  Review of the result(s) of each unique test - right shoulder x-rays       Disclaimer: This note consists of symbols derived from keyboarding, dictation and/or voice recognition software. As a result, there may be errors in the script that have gone undetected. Please consider this when interpreting information found in this chart.    Large Joint Injection/Arthocentesis: R subacromial bursa    Date/Time: 12/12/2024 5:49 PM    Performed by: Tye Cam MD  Authorized by: Tye Cam MD    Indications:  Pain  Needle Size:  25 G  Guidance: ultrasound    Approach:  Lateral  Location:  Shoulder      Site:  R subacromial bursa  Medications:  6 mg betamethasone acet & sod phos 6 (3-3) MG/ML; 4 mL ROPivacaine 5 MG/ML  Outcome:  Tolerated well, no immediate complications  Procedure discussed: discussed risks, benefits, and alternatives    Consent Given by:  Patient  Timeout: timeout called immediately prior to procedure    Prep: patient was prepped and draped in usual sterile fashion     Ultrasound images of procedure were permanently stored.     Patient reported significant improvement of pain after the numbing portion right subacromial bursa steroid injection.  Ultrasound guided images were permanently stored.   Aftercare instructions given to patient.  Plan to follow-up as  discussed above.     Tye Cam MD [facility]            Again, thank you for allowing me to participate in the care of your patient.        Sincerely,        Tye Cam MD

## 2024-12-12 NOTE — PATIENT INSTRUCTIONS
# Acute on Chronic Right Shoulder and Foot Pain: Jeanne Wasserman  was seen today for right shoulder and foot pain. Symptoms had been going on for years, worsening over the past month in right foot and shoulder. On examination there are positive findings of tenderness to palpation over the rotator cuff tendon, lateral foot. Imaging findings showed mild foot arthritis, no fracture. Likely cause of patient's condition due to flare of midfoot mild arthritis, irritated rotator cuff tendon.  Counseled patient on nature of condition and treatment options.  Given this plan as below, follow-up 1 mon as needed.     Image Findings: mild foot arthritis  Treatment: Activities as tolerated, home exercises given today  Job: As tolerated  Medications/Injections: Limited tylenol/ibuprofen for pain for 1-2 weeks, Topical Voltaren gel, right subacromial bursa steroid injection   Follow-up: In one month if symptoms do not improve, sooner if worsening  Can consider foot steroid injection    Please call [phone]   Ask for my team if you have any questions or concerns    If you have not yet received the influenza vaccine but would like to get one, please call  [phone] or you can schedule via [employer]    It was great seeing you again today!    Tye Cam MD, CAM     FS Injection Discharge Instructions    Procedure: right subacromial bursa steroid injection     You may shower, however avoid swimming, tub baths or hot tubs for 24 hours following your procedure  You may have a mild to moderate increase in pain for several days following the injection.  It may take up to 14 days for the steroid medication to start working although you may feel the effect as early as a few days after the procedure.  You may use ice packs for 10-15 minutes, 3 to 4 times a day at the injection site for comfort  You may use anti-inflammatory medications (such as Ibuprofen or Aleve or Advil) or Tylenol for pain control if necessary  If you  were fasting, you may resume your normal diet and medications after the procedure  If you have diabetes, check your blood sugar more frequently than usual as your blood sugar may be higher than normal for 10-14 days following a steroid injection. Contact your doctor who manages your diabetes if your blood sugar is higher than usual    If you experience any of the following, call [facility] @ [phone] or [phone]  -Fever over 100 degree F  -Swelling, bleeding, redness, drainage, warmth at the injection site  - New or worsening pain

## 2024-12-12 NOTE — PROGRESS NOTES
ASSESSMENT & PLAN    Jeanne was seen today for pain.    Diagnoses and all orders for this visit:    Chronic foot pain, right  -     XR Foot Right G/E 3 Views; Future  -     Large Joint Injection/Arthocentesis: R subacromial bursa    Chronic pain of right ankle  -     Cancel: XR Ankle RT G/E 3 vw; Future      This issue is acute on chronic and Worsening.    # Acute on Chronic Right Shoulder and Foot Pain: Jeanne Wasserman  was seen today for right shoulder and foot pain. Symptoms had been going on for years, worsening over the past month in right foot and shoulder. On examination there are positive findings of tenderness to palpation over the rotator cuff tendon, lateral foot. Imaging findings showed mild foot arthritis, no fracture. Likely cause of patient's condition due to flare of midfoot mild arthritis, irritated rotator cuff tendon.  Counseled patient on nature of condition and treatment options.  Given this plan as below, follow-up 1 mon as needed.     Image Findings: mild foot arthritis  Treatment: Activities as tolerated, home exercises given today  Job: As tolerated  Medications/Injections: Limited tylenol/ibuprofen for pain for 1-2 weeks, Topical Voltaren gel, right subacromial bursa steroid injection   Follow-up: In one month if symptoms do not improve, sooner if worsening  Can consider foot steroid injection      Tye Cam MD  [facility]    I was present with the resident during the history and exam.  I discussed the case with the resident and agree with the findings as documented in the assessment and plan.       -----  Chief Complaint   Patient presents with    Right Foot - Pain       SUBJECTIVE  Jeanne Wasserman is a/an 49 year old female who is seen as a self referral for evaluation of right foot pain.     The patient is seen by themselves.      Onset: 1 month(s) ago. Reports insidious onset without acute precipitating event.  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  Now radiating to upper trap.   Lumbar support for chair at work. No interim injury.       No family history pertinent to patient's problem today.      REVIEW OF SYSTEMS:  Review of Systems  Constitutional, HEENT, cardiovascular, pulmonary, gi and gu systems are negative, except as otherwise noted.    OBJECTIVE:  LMP 07/04/2019 (Approximate)    General: healthy, alert and in no distress  HEENT: no scleral icterus or conjunctival erythema  Skin: no suspicious lesions or rash. No jaundice.  CV: distal perfusion intact    Resp: normal respiratory effort without conversational dyspnea   Psych: normal mood and affect  Gait: normal steady gait with appropriate coordination and balance    Neuro: Normal light sensory exam of right upper extremity     Ortho Exam   RIGHT SHOULDER  Inspection:    no swelling, bruising, discoloration, or obvious deformity or asymmetry  Palpation:    Tender about the supraspinatus insertion. Remainder of bony and tendinous landmarks are nontender.  Active Range of Motion:     Abduction 1650, FF 1650, , IR L4.    Strength:    Scapular plane abduction 5/55/5,  ER 5/5, IR 5/5, biceps , triceps 5/5  Special Tests:    Positive: Neer's, Mattson', and supraspinatus (empty can)       CERVICAL SPINE  Inspection:    normal cervical lordosis present, rounded shoulders, forward head posture  Palpation:    Nontender.  Range of Motion:     Flexion full    Extension full    Right side bend full    Left side bend  full    Right rotation full    Left rotation full  Strength:    Full strength throughout all neck muscles  Special Tests:    Positive: None    Negative: Spurling's (bilateral)    RADIOLOGY:  I independently, visualized and reviewed these images with the patient    Mild right glenohumeral arthritis      Review of external notes as documented elsewhere in note  Review of the result(s) of each unique test - right shoulder x-rays       Disclaimer: This note consists of symbols derived from keyboarding, dictation and/or voice recognition software. As a result, there may be errors in the script that have gone undetected. Please consider this when interpreting information found in this chart.    Large Joint Injection/Arthocentesis: R subacromial bursa    Date/Time: 12/12/2024 5:49 PM    Performed by: Tye Cam MD  Authorized by: Tye Cam MD    Indications:  Pain  Needle Size:  25 G  Guidance: ultrasound    Approach:  Lateral  Location:  Shoulder      Site:  R subacromial bursa  Medications:  6 mg betamethasone acet & sod phos 6 (3-3) MG/ML; 4 mL ROPivacaine 5 MG/ML  Outcome:  Tolerated well, no immediate complications  Procedure discussed: discussed risks, benefits, and alternatives    Consent Given by:  Patient  Timeout: timeout called immediately prior to procedure    Prep: patient was prepped and draped in usual sterile fashion     Ultrasound images of procedure were permanently stored.     Patient reported significant improvement of pain after the numbing portion right subacromial bursa steroid injection.  Ultrasound guided images were permanently stored.   Aftercare instructions given to patient.  Plan to follow-up as discussed above.     Tye Cam MD [facility]

## 2024-12-17 RX ORDER — ROPIVACAINE HYDROCHLORIDE 5 MG/ML
4 INJECTION, SOLUTION EPIDURAL; INFILTRATION; PERINEURAL
Status: COMPLETED | OUTPATIENT
Start: 2024-12-12 | End: 2024-12-12

## 2024-12-17 RX ORDER — BETAMETHASONE SODIUM PHOSPHATE AND BETAMETHASONE ACETATE 3; 3 MG/ML; MG/ML
6 INJECTION, SUSPENSION INTRA-ARTICULAR; INTRALESIONAL; INTRAMUSCULAR; SOFT TISSUE
Status: COMPLETED | OUTPATIENT
Start: 2024-12-12 | End: 2024-12-12

## 2024-12-18 ENCOUNTER — MYC MEDICAL ADVICE (OUTPATIENT)
Dept: FAMILY MEDICINE | Facility: CLINIC | Age: 49
End: 2024-12-18
Payer: COMMERCIAL

## 2024-12-18 DIAGNOSIS — E66.812 CLASS 2 OBESITY DUE TO EXCESS CALORIES WITHOUT SERIOUS COMORBIDITY WITH BODY MASS INDEX (BMI) OF 35.0 TO 35.9 IN ADULT: ICD-10-CM

## 2024-12-18 DIAGNOSIS — E66.09 CLASS 2 OBESITY DUE TO EXCESS CALORIES WITHOUT SERIOUS COMORBIDITY WITH BODY MASS INDEX (BMI) OF 35.0 TO 35.9 IN ADULT: ICD-10-CM

## 2024-12-19 RX ORDER — PHENTERMINE HYDROCHLORIDE 37.5 MG/1
37.5 TABLET ORAL
Qty: 30 TABLET | Refills: 1 | Status: SHIPPED | OUTPATIENT
Start: 2024-12-19

## 2024-12-21 ENCOUNTER — HEALTH MAINTENANCE LETTER (OUTPATIENT)
Age: 49
End: 2024-12-21

## 2025-02-04 ENCOUNTER — OFFICE VISIT (OUTPATIENT)
Dept: ORTHOPEDICS | Facility: CLINIC | Age: 50
End: 2025-02-04
Payer: COMMERCIAL

## 2025-02-04 DIAGNOSIS — M79.671 CHRONIC FOOT PAIN, RIGHT: Primary | ICD-10-CM

## 2025-02-04 DIAGNOSIS — G89.29 CHRONIC FOOT PAIN, RIGHT: Primary | ICD-10-CM

## 2025-02-04 PROCEDURE — 20606 DRAIN/INJ JOINT/BURSA W/US: CPT | Mod: RT | Performed by: FAMILY MEDICINE

## 2025-02-04 RX ORDER — ROPIVACAINE HYDROCHLORIDE 5 MG/ML
1 INJECTION, SOLUTION EPIDURAL; INFILTRATION; PERINEURAL
Status: COMPLETED | OUTPATIENT
Start: 2025-02-04 | End: 2025-02-04

## 2025-02-04 RX ORDER — BETAMETHASONE SODIUM PHOSPHATE AND BETAMETHASONE ACETATE 3; 3 MG/ML; MG/ML
6 INJECTION, SUSPENSION INTRA-ARTICULAR; INTRALESIONAL; INTRAMUSCULAR; SOFT TISSUE
Status: COMPLETED | OUTPATIENT
Start: 2025-02-04 | End: 2025-02-04

## 2025-02-04 RX ADMIN — ROPIVACAINE HYDROCHLORIDE 1 ML: 5 INJECTION, SOLUTION EPIDURAL; INFILTRATION; PERINEURAL at 13:39

## 2025-02-04 RX ADMIN — BETAMETHASONE SODIUM PHOSPHATE AND BETAMETHASONE ACETATE 6 MG: 3; 3 INJECTION, SUSPENSION INTRA-ARTICULAR; INTRALESIONAL; INTRAMUSCULAR; SOFT TISSUE at 13:39

## 2025-02-04 NOTE — PROGRESS NOTES
Medium Joint Injection/Arthrocentesis    Date/Time: 2/4/2025 1:39 PM    Performed by: Tye Cam MD  Authorized by: Tye Cam MD    Indications:  Pain  Needle Size:  25 G  Guidance: ultrasound    Approach:  Lateral  Location:  Ankle  Location comment:  Right peroneal tendon sheath   Medications:  6 mg betamethasone acet & sod phos 6 (3-3) MG/ML; 1 mL ROPivacaine 5 MG/ML  Outcome:  Tolerated well, no immediate complications  Procedure discussed: discussed risks, benefits, and alternatives    Consent Given by:  Patient  Timeout: timeout called immediately prior to procedure    Prep: patient was prepped and draped in usual sterile fashion     Ultrasound images of procedure were permanently stored.     Patient reported significant improvement of pain after the numbing portion right peroneal tendon sheath steroid injection.  Ultrasound guided images were permanently stored.  Aftercare instructions given to patient.  Plan to follow-up as previously discussed with referring provider.     Tye Cam MD [facility]

## 2025-02-04 NOTE — PATIENT INSTRUCTIONS
[facility] Injection Discharge Instructions    Procedure: Right peroneal tendon steroid injection    Follow-up: 1 mon as needed for this condition     You may shower, however avoid swimming, tub baths or hot tubs for 24 hours following your procedure  You may have a mild to moderate increase in pain for several days following the injection.  It may take up to 14 days for the steroid medication to start working although you may feel the effect as early as a few days after the procedure.  You may use ice packs for 10-15 minutes, 3 to 4 times a day at the injection site for comfort  You may use anti-inflammatory medications (such as Ibuprofen or Aleve or Advil) or Tylenol for pain control if necessary  If you were fasting, you may resume your normal diet and medications after the procedure  If you have diabetes, check your blood sugar more frequently than usual as your blood sugar may be higher than normal for 10-14 days following a steroid injection. Contact your doctor who manages your diabetes if your blood sugar is higher than usual    If you experience any of the following, call [facility] @ [phone] or [phone]  -Fever over 100 degree F  -Swelling, bleeding, redness, drainage, warmth at the injection site  - New or worsening pain     It was great seeing you again today!    Tye Cam

## 2025-02-17 ENCOUNTER — TELEPHONE (OUTPATIENT)
Dept: FAMILY MEDICINE | Facility: CLINIC | Age: 50
End: 2025-02-17
Payer: COMMERCIAL

## 2025-02-17 NOTE — TELEPHONE ENCOUNTER
Patient Quality Outreach    Patient is due for the following:   Colon Cancer Screening  Breast Cancer Screening - Mammogram  Depression  -  PHQ-9 needed  Physical Preventive Adult Physical      Topic Date Due    Hepatitis B Vaccine (1 of 3 - 19+ 3-dose series) Never done    Flu Vaccine (1) 09/01/2024    COVID-19 Vaccine (4 - 2024-25 season) 09/01/2024       Action(s) Taken:   Schedule a Adult Preventative    Type of outreach:    Sent [employer] message.    Questions for provider review:    None           Magaly Ralph

## 2025-02-25 SDOH — HEALTH STABILITY: PHYSICAL HEALTH: ON AVERAGE, HOW MANY MINUTES DO YOU ENGAGE IN EXERCISE AT THIS LEVEL?: 20 MIN

## 2025-02-25 SDOH — HEALTH STABILITY: PHYSICAL HEALTH: ON AVERAGE, HOW MANY DAYS PER WEEK DO YOU ENGAGE IN MODERATE TO STRENUOUS EXERCISE (LIKE A BRISK WALK)?: 5 DAYS

## 2025-02-25 ASSESSMENT — PATIENT HEALTH QUESTIONNAIRE - PHQ9
SUM OF ALL RESPONSES TO PHQ QUESTIONS 1-9: 3
SUM OF ALL RESPONSES TO PHQ QUESTIONS 1-9: 3
10. IF YOU CHECKED OFF ANY PROBLEMS, HOW DIFFICULT HAVE THESE PROBLEMS MADE IT FOR YOU TO DO YOUR WORK, TAKE CARE OF THINGS AT HOME, OR GET ALONG WITH OTHER PEOPLE: NOT DIFFICULT AT ALL

## 2025-02-25 ASSESSMENT — SOCIAL DETERMINANTS OF HEALTH (SDOH): HOW OFTEN DO YOU GET TOGETHER WITH FRIENDS OR RELATIVES?: THREE TIMES A WEEK

## 2025-02-26 ENCOUNTER — ORDERS ONLY (AUTO-RELEASED) (OUTPATIENT)
Dept: FAMILY MEDICINE | Facility: CLINIC | Age: 50
End: 2025-02-26

## 2025-02-26 ENCOUNTER — OFFICE VISIT (OUTPATIENT)
Dept: FAMILY MEDICINE | Facility: CLINIC | Age: 50
End: 2025-02-26
Payer: COMMERCIAL

## 2025-02-26 ENCOUNTER — TELEPHONE (OUTPATIENT)
Dept: FAMILY MEDICINE | Facility: CLINIC | Age: 50
End: 2025-02-26

## 2025-02-26 VITALS
OXYGEN SATURATION: 99 % | TEMPERATURE: 97.4 F | WEIGHT: 242.9 LBS | HEIGHT: 65 IN | BODY MASS INDEX: 40.47 KG/M2 | SYSTOLIC BLOOD PRESSURE: 131 MMHG | HEART RATE: 87 BPM | DIASTOLIC BLOOD PRESSURE: 89 MMHG | RESPIRATION RATE: 16 BRPM

## 2025-02-26 DIAGNOSIS — E53.8 B12 DEFICIENCY: ICD-10-CM

## 2025-02-26 DIAGNOSIS — Z12.31 VISIT FOR SCREENING MAMMOGRAM: Primary | ICD-10-CM

## 2025-02-26 DIAGNOSIS — Z12.11 SCREEN FOR COLON CANCER: ICD-10-CM

## 2025-02-26 DIAGNOSIS — Z13.220 SCREENING FOR HYPERLIPIDEMIA: ICD-10-CM

## 2025-02-26 DIAGNOSIS — E66.09 CLASS 2 OBESITY DUE TO EXCESS CALORIES WITHOUT SERIOUS COMORBIDITY WITH BODY MASS INDEX (BMI) OF 35.0 TO 35.9 IN ADULT: ICD-10-CM

## 2025-02-26 DIAGNOSIS — M54.2 NECK PAIN: ICD-10-CM

## 2025-02-26 DIAGNOSIS — Z00.00 ROUTINE GENERAL MEDICAL EXAMINATION AT A HEALTH CARE FACILITY: ICD-10-CM

## 2025-02-26 DIAGNOSIS — Z13.1 SCREENING FOR DIABETES MELLITUS: ICD-10-CM

## 2025-02-26 DIAGNOSIS — E66.812 CLASS 2 OBESITY DUE TO EXCESS CALORIES WITHOUT SERIOUS COMORBIDITY WITH BODY MASS INDEX (BMI) OF 35.0 TO 35.9 IN ADULT: ICD-10-CM

## 2025-02-26 LAB
BASOPHILS # BLD AUTO: 0 10E3/UL (ref 0–0.2)
BASOPHILS NFR BLD AUTO: 0 %
EOSINOPHIL # BLD AUTO: 0 10E3/UL (ref 0–0.7)
EOSINOPHIL NFR BLD AUTO: 1 %
ERYTHROCYTE [DISTWIDTH] IN BLOOD BY AUTOMATED COUNT: 14 % (ref 10–15)
EST. AVERAGE GLUCOSE BLD GHB EST-MCNC: 103 MG/DL
HBA1C MFR BLD: 5.2 % (ref 0–5.6)
HCT VFR BLD AUTO: 43 % (ref 35–47)
HGB BLD-MCNC: 13.6 G/DL (ref 11.7–15.7)
IMM GRANULOCYTES # BLD: 0 10E3/UL
IMM GRANULOCYTES NFR BLD: 0 %
LYMPHOCYTES # BLD AUTO: 2 10E3/UL (ref 0.8–5.3)
LYMPHOCYTES NFR BLD AUTO: 36 %
MCH RBC QN AUTO: 27.6 PG (ref 26.5–33)
MCHC RBC AUTO-ENTMCNC: 31.6 G/DL (ref 31.5–36.5)
MCV RBC AUTO: 87 FL (ref 78–100)
MONOCYTES # BLD AUTO: 0.5 10E3/UL (ref 0–1.3)
MONOCYTES NFR BLD AUTO: 9 %
NEUTROPHILS # BLD AUTO: 3.1 10E3/UL (ref 1.6–8.3)
NEUTROPHILS NFR BLD AUTO: 54 %
PLATELET # BLD AUTO: 254 10E3/UL (ref 150–450)
RBC # BLD AUTO: 4.92 10E6/UL (ref 3.8–5.2)
WBC # BLD AUTO: 5.7 10E3/UL (ref 4–11)

## 2025-02-26 RX ORDER — PHENTERMINE HYDROCHLORIDE 37.5 MG/1
37.5 TABLET ORAL
Qty: 30 TABLET | Refills: 3 | Status: SHIPPED | OUTPATIENT
Start: 2025-02-26

## 2025-02-26 RX ORDER — TOPIRAMATE 50 MG/1
50 TABLET, FILM COATED ORAL 2 TIMES DAILY
Qty: 180 TABLET | Refills: 1 | Status: SHIPPED | OUTPATIENT
Start: 2025-02-26

## 2025-02-26 ASSESSMENT — PAIN SCALES - GENERAL: PAINLEVEL_OUTOF10: NO PAIN (0)

## 2025-02-26 NOTE — TELEPHONE ENCOUNTER
PRIOR AUTHORIZATION DENIED    Medication: SEMAGLUTIDE-WEIGHT MANAGEMENT 2.4 MG/0.75ML SC SOAJ  Insurance Company: [employer] - Phone [phone] Fax [phone]  Denial Date: 2/26/2025  Denial Reason(s):     Appeal Information:     Patient Notified: No

## 2025-02-26 NOTE — PROGRESS NOTES
Preventive Care Visit  [facility]  Sundeep Warren MD, Internal Medicine  Feb 26, 2025      Assessment & Plan     Visit for screening mammogram  Her last mammogram was in 2023  She needs a repeat mammogram  - MA Screening Bilateral w/ Nicholas; Future    Screen for colon cancer  She does not want to do a colonoscopy at this point and we discussed about Cologuard and fit test  She has no family history of colon cancer or polyps so she will be a candidate for Cologuard  - COLOGUARD(EXACT SCIENCES); Future    B12 deficiency  She used to do vitamin B12 injections for her vitamin B12 deficiency but currently she is only taking sublingual vitamin B12  I will check vitamin B12 level  - Vitamin B12; Future  - Vitamin B12    Routine general medical examination at a health care facility  Discussed about diet and exercise  Discussed about vaccinations like hepatitis B/influenza/COVID  She is going to defer them for today  - CBC with platelets and differential; Future  - Comprehensive metabolic panel (BMP + Alb, Alk Phos, ALT, AST, Total. Bili, TP); Future  - CBC with platelets and differential  - Comprehensive metabolic panel (BMP + Alb, Alk Phos, ALT, AST, Total. Bili, TP)    Screening for hyperlipidemia    - Lipid panel reflex to direct LDL Fasting; Future  - Lipid panel reflex to direct LDL Fasting    Screening for diabetes mellitus    - Hemoglobin A1c; Future  - Hemoglobin A1c    Neck pain    - topiramate (TOPAMAX) 50 MG tablet; Take 1 tablet (50 mg) by mouth 2 times daily.    Class 2 obesity due to excess calories without serious comorbidity with body mass index (BMI) of 35.0 to 35.9 in adult  She has managed to lose considerable amount of weight because of Wegovy and phentermine and will continue that  - Semaglutide-Weight Management (WEGOVY) 2.4 MG/0.75ML pen; Inject 2.4 mg subcutaneously once a week.  - phentermine (ADIPEX-P) 37.5 MG tablet; Take 1 tablet (37.5 mg) by mouth every morning (before  "breakfast).    Patient has been advised of split billing requirements and indicates understanding: No        BMI  Estimated body mass index is 40.42 kg/m  as calculated from the following:    Height as of this encounter: 1.651 m (5' 5\").    Weight as of this encounter: 110.2 kg (242 lb 14.4 oz).   Weight management plan: Discussed healthy diet and exercise guidelines    Counseling  Appropriate preventive services were addressed with this patient via screening, questionnaire, or discussion as appropriate for fall prevention, nutrition, physical activity, Tobacco-use cessation, social engagement, weight loss and cognition.  Checklist reviewing preventive services available has been given to the patient.  Reviewed patient's diet, addressing concerns and/or questions.   She is at risk for psychosocial distress and has been provided with information to reduce risk.           Charlotte Angel is a 49 year old, presenting for the following:  Physical        2/26/2025     3:34 PM   Additional Questions   Roomed by Nikko   Accompanied by Self         2/26/2025     3:34 PM   Patient Reported Additional Medications   Patient reports taking the following new medications None        Via the Health Maintenance questionnaire, the patient has reported the following services have been completed -Mammogram: Rene 2024-04-15, this information has not been sent to the abstraction team.    HPI  Here for annual physical        Health Care Directive  Patient does not have a Health Care Directive: Discussed advance care planning with patient; however, patient declined at this time.      2/25/2025   General Health   How would you rate your overall physical health? Good   Feel stress (tense, anxious, or unable to sleep) To some extent   (!) STRESS CONCERN      2/25/2025   Nutrition   Three or more servings of calcium each day? Yes   Diet: Regular (no restrictions)   How many servings of fruit and vegetables per day? (!) 2-3   How " many sweetened beverages each day? 0-1         2/25/2025   Exercise   Days per week of moderate/strenous exercise 5 days   Average minutes spent exercising at this level 20 min         2/25/2025   Social Factors   Frequency of gathering with friends or relatives Three times a week   Worry food won't last until get money to buy more No   Food not last or not have enough money for food? No   Do you have housing? (Housing is defined as stable permanent housing and does not include staying ouside in a car, in a tent, in an abandoned building, in an overnight shelter, or couch-surfing.) Yes   Are you worried about losing your housing? No   Lack of transportation? No   Unable to get utilities (heat,electricity)? No         2/25/2025   Dental   Dentist two times every year? Yes          Today's PHQ-9 Score:       2/25/2025     4:24 PM   PHQ-9 SCORE   PHQ-9 Total Score MyChart 3 (Minimal depression)   PHQ-9 Total Score 3        Patient-reported         2/25/2025   Substance Use   Alcohol more than 3/day or more than 7/wk No   Do you use any other substances recreationally? No     Social History     Tobacco Use    Smoking status: Never    Smokeless tobacco: Never   Vaping Use    Vaping status: Never Used   Substance Use Topics    Alcohol use: No    Drug use: No           9/12/2023   LAST FHS-7 RESULTS   1st degree relative breast or ovarian cancer No   Any relative bilateral breast cancer No   Any male have breast cancer No   Any ONE woman have BOTH breast AND ovarian cancer No   Any woman with breast cancer before 50yrs No   2 or more relatives with breast AND/OR ovarian cancer No   2 or more relatives with breast AND/OR bowel cancer No                2/25/2025   STI Screening   New sexual partner(s) since last STI/HIV test? No     History of abnormal Pap smear: Status post hysterectomy with removal of cervix and no history of CIN2 or greater or cervical cancer. Health Maintenance and Surgical History updated.       ASCVD  "Risk   The ASCVD Risk score (Arti CHANDRA, et al., 2019) failed to calculate for the following reasons:    Cannot find a previous HDL lab    Cannot find a previous total cholesterol lab       Reviewed and updated as needed this visit by Provider                          Review of Systems  Constitutional, HEENT, cardiovascular, pulmonary, gi and gu systems are negative, except as otherwise noted.     Objective    Exam  /89 (BP Location: Right arm, Patient Position: Sitting, Cuff Size: Adult Large)   Pulse 87   Temp 97.4  F (36.3  C) (Tympanic)   Resp 16   Ht 1.651 m (5' 5\")   Wt 110.2 kg (242 lb 14.4 oz)   LMP 07/04/2019 (Approximate)   SpO2 99%   BMI 40.42 kg/m     Estimated body mass index is 40.42 kg/m  as calculated from the following:    Height as of this encounter: 1.651 m (5' 5\").    Weight as of this encounter: 110.2 kg (242 lb 14.4 oz).    Physical Exam  GENERAL: alert and no distress  EYES: Eyes grossly normal to inspection, PERRL and conjunctivae and sclerae normal  HENT: ear canals and TM's normal, nose and mouth without ulcers or lesions  NECK: no adenopathy, no asymmetry, masses, or scars  RESP: lungs clear to auscultation - no rales, rhonchi or wheezes  CV: regular rate and rhythm, normal S1 S2, no S3 or S4, no murmur, click or rub, no peripheral edema  ABDOMEN: soft, nontender, no hepatosplenomegaly, no masses and bowel sounds normal  MS: no gross musculoskeletal defects noted, no edema  SKIN: no suspicious lesions or rashes  NEURO: Normal strength and tone, mentation intact and speech normal  PSYCH: mentation appears normal, affect normal/bright        Signed Electronically by: Sundeep Warren MD    "

## 2025-02-27 ENCOUNTER — PATIENT OUTREACH (OUTPATIENT)
Dept: CARE COORDINATION | Facility: CLINIC | Age: 50
End: 2025-02-27
Payer: COMMERCIAL

## 2025-02-27 ENCOUNTER — MYC MEDICAL ADVICE (OUTPATIENT)
Dept: FAMILY MEDICINE | Facility: CLINIC | Age: 50
End: 2025-02-27
Payer: COMMERCIAL

## 2025-02-27 LAB
ALBUMIN SERPL BCG-MCNC: 4.3 G/DL (ref 3.5–5.2)
ALP SERPL-CCNC: 77 U/L (ref 40–150)
ALT SERPL W P-5'-P-CCNC: 20 U/L (ref 0–50)
ANION GAP SERPL CALCULATED.3IONS-SCNC: 10 MMOL/L (ref 7–15)
AST SERPL W P-5'-P-CCNC: 24 U/L (ref 0–45)
BILIRUB SERPL-MCNC: 0.3 MG/DL
BUN SERPL-MCNC: 23.3 MG/DL (ref 6–20)
CALCIUM SERPL-MCNC: 9.9 MG/DL (ref 8.8–10.4)
CHLORIDE SERPL-SCNC: 107 MMOL/L (ref 98–107)
CHOLEST SERPL-MCNC: 172 MG/DL
CREAT SERPL-MCNC: 0.83 MG/DL (ref 0.51–0.95)
EGFRCR SERPLBLD CKD-EPI 2021: 86 ML/MIN/1.73M2
FASTING STATUS PATIENT QL REPORTED: YES
FASTING STATUS PATIENT QL REPORTED: YES
GLUCOSE SERPL-MCNC: 83 MG/DL (ref 70–99)
HCO3 SERPL-SCNC: 21 MMOL/L (ref 22–29)
HDLC SERPL-MCNC: 64 MG/DL
LDLC SERPL CALC-MCNC: 92 MG/DL
NONHDLC SERPL-MCNC: 108 MG/DL
POTASSIUM SERPL-SCNC: 4.3 MMOL/L (ref 3.4–5.3)
PROT SERPL-MCNC: 7.4 G/DL (ref 6.4–8.3)
SODIUM SERPL-SCNC: 138 MMOL/L (ref 135–145)
TRIGL SERPL-MCNC: 79 MG/DL
VIT B12 SERPL-MCNC: 831 PG/ML (ref 232–1245)

## 2025-02-27 NOTE — TELEPHONE ENCOUNTER
From PA team:    PA DENIED   Please close encounter when finished.   If provider would like to appeal we will need a detailed letter of medical necessity to start the process.   Thank you   RPPA (Retail Pharmacy Prior Authorization) Team    Routing to provider to review and advise.    Kristina Kjellberg, MSN, RN  [facility] Primary Care Triage

## 2025-06-24 ENCOUNTER — OFFICE VISIT (OUTPATIENT)
Dept: ORTHOPEDICS | Facility: CLINIC | Age: 50
End: 2025-06-24
Payer: COMMERCIAL

## 2025-06-24 DIAGNOSIS — G89.29 CHRONIC RIGHT SHOULDER PAIN: ICD-10-CM

## 2025-06-24 DIAGNOSIS — M79.671 CHRONIC FOOT PAIN, RIGHT: Primary | ICD-10-CM

## 2025-06-24 DIAGNOSIS — G89.29 CHRONIC FOOT PAIN, RIGHT: Primary | ICD-10-CM

## 2025-06-24 DIAGNOSIS — M25.511 CHRONIC RIGHT SHOULDER PAIN: ICD-10-CM

## 2025-06-24 PROCEDURE — 20611 DRAIN/INJ JOINT/BURSA W/US: CPT | Mod: RT | Performed by: FAMILY MEDICINE

## 2025-06-24 PROCEDURE — 99213 OFFICE O/P EST LOW 20 MIN: CPT | Mod: 51 | Performed by: FAMILY MEDICINE

## 2025-06-24 PROCEDURE — 20550 NJX 1 TENDON SHEATH/LIGAMENT: CPT | Mod: 51 | Performed by: FAMILY MEDICINE

## 2025-06-24 PROCEDURE — 76942 ECHO GUIDE FOR BIOPSY: CPT | Mod: RT | Performed by: FAMILY MEDICINE

## 2025-06-24 RX ADMIN — BETAMETHASONE SODIUM PHOSPHATE AND BETAMETHASONE ACETATE 6 MG: 3; 3 INJECTION, SUSPENSION INTRA-ARTICULAR; INTRALESIONAL; INTRAMUSCULAR; SOFT TISSUE at 10:06

## 2025-06-24 RX ADMIN — ROPIVACAINE HYDROCHLORIDE 2 ML: 5 INJECTION, SOLUTION EPIDURAL; INFILTRATION; PERINEURAL at 10:06

## 2025-06-24 RX ADMIN — ROPIVACAINE HYDROCHLORIDE 2 ML: 5 INJECTION, SOLUTION EPIDURAL; INFILTRATION; PERINEURAL at 10:05

## 2025-06-24 RX ADMIN — BETAMETHASONE SODIUM PHOSPHATE AND BETAMETHASONE ACETATE 6 MG: 3; 3 INJECTION, SUSPENSION INTRA-ARTICULAR; INTRALESIONAL; INTRAMUSCULAR; SOFT TISSUE at 10:05

## 2025-06-24 SDOH — HEALTH STABILITY: PHYSICAL HEALTH: ON AVERAGE, HOW MANY DAYS PER WEEK DO YOU ENGAGE IN MODERATE TO STRENUOUS EXERCISE (LIKE A BRISK WALK)?: 3 DAYS

## 2025-06-24 SDOH — HEALTH STABILITY: PHYSICAL HEALTH: ON AVERAGE, HOW MANY MINUTES DO YOU ENGAGE IN EXERCISE AT THIS LEVEL?: 30 MIN

## 2025-06-24 ASSESSMENT — PAIN SCALES - GENERAL: PAINLEVEL_OUTOF10: MODERATE PAIN (6)

## 2025-06-24 NOTE — PATIENT INSTRUCTIONS
# Acute on Chronic Right Shoulder and Foot Pain: Jeanne Wasserman  was seen today for follow-up on right shoulder and foot pain. Symptoms had been going on for years, worsening over the past month in right foot and shoulder. On examination there are positive findings of tenderness to palpation over the rotator cuff tendon, lateral foot notably over the peroneal tendons. Symptoms affecting her ability to work requiring a form for accommodations. Previous steroid injections helped but given the closer timeline in efficacy, will add PT as well. Imaging findings showed mild foot arthritis, no fracture. Likely cause of patient's condition due to flare of irritated peroneal tendon, irritated rotator cuff tendon.  Lower concern for lumbar radiculopathy. Counseled patient on nature of condition and treatment options.  Given this plan as below, follow-up 1 mon as needed.     Image Findings: mild foot arthritis  Treatment: Activities as tolerated, continue home exercises, PT referral placed  Job: As tolerated  Medications/Injections: Limited tylenol/ibuprofen for pain for 1-2 weeks, Topical Voltaren gel, right subacromial bursa steroid injection, right peroneal tendon steroid injection   Follow-up: In one month if symptoms do not improve, sooner if worsening  Can consider repeat evaluation    Please call [phone]   Ask for my team if you have any questions or concerns    If you have not yet received the influenza vaccine but would like to get one, please call  [phone] or you can schedule via [employer]    It was great seeing you again today!    Tye Cam MD, CAM     [facility] [place] Injection Discharge Instructions    Procedure: right subacromial and peroneal tendon steroid injections    You may shower, however avoid swimming, tub baths or hot tubs for 24 hours following your procedure  You may have a mild to moderate increase in pain for several days following the injection.  It may take up to 14 days for the steroid  medication to start working although you may feel the effect as early as a few days after the procedure.  You may use ice packs for 10-15 minutes, 3 to 4 times a day at the injection site for comfort  You may use anti-inflammatory medications (such as Ibuprofen or Aleve or Advil) or Tylenol for pain control if necessary  If you were fasting, you may resume your normal diet and medications after the procedure  If you have diabetes, check your blood sugar more frequently than usual as your blood sugar may be higher than normal for 10-14 days following a steroid injection. Contact your doctor who manages your diabetes if your blood sugar is higher than usual    If you experience any of the following, call [facility] @ [phone] or [phone]  -Fever over 100 degree F  -Swelling, bleeding, redness, drainage, warmth at the injection site  - New or worsening pain

## 2025-06-24 NOTE — LETTER
6/24/2025      Jeanne Wasserman  [address] [facility] [place] [id number]      Dear Colleague,    Thank you for referring your patient, Jeanne Wasserman, to the [facility]. Please see a copy of my visit note below.    ASSESSMENT & PLAN    Jeanne was seen today for pain, follow up, pain and follow up.    Diagnoses and all orders for this visit:    Chronic foot pain, right  -     POC US GUIDANCE NEEDLE PLACEMENT; Future  -     Medium Joint Injection/Arthrocentesis  -     Physical Therapy  Referral; Future    Chronic right shoulder pain  -     POC US GUIDANCE NEEDLE PLACEMENT; Future  -     Large Joint Injection/Arthocentesis: R subacromial bursa  -     Physical Therapy  Referral; Future      # Acute on Chronic Right Shoulder and Foot Pain: Jeanne Wasserman  was seen today for follow-up on right shoulder and foot pain. Symptoms had been going on for years, worsening over the past month in right foot and shoulder. On examination there are positive findings of tenderness to palpation over the rotator cuff tendon, lateral foot notably over the peroneal tendons. Symptoms affecting her ability to work requiring a form for accommodations. Previous steroid injections helped but given the closer timeline in efficacy, will add PT as well. Imaging findings showed mild foot arthritis, no fracture. Likely cause of patient's condition due to flare of irritated peroneal tendon, irritated rotator cuff tendon.  Lower concern for lumbar radiculopathy. Counseled patient on nature of condition and treatment options.  Given this plan as below, follow-up 1 mon as needed.     Image Findings: mild foot arthritis  Treatment: Activities as tolerated, continue home exercises, PT referral placed  Job: As tolerated  Medications/Injections: Limited tylenol/ibuprofen for pain for 1-2 weeks, Topical Voltaren gel, right subacromial bursa steroid injection, right peroneal tendon steroid  injection   Follow-up: In one month if symptoms do not improve, sooner if worsening  Can consider repeat evaluation    -----    SUBJECTIVE:  Jeanne Wasserman is a 50 year old female who is seen in follow-up for right foot and right shoulder. They were last seen 2/4/25 and right peroneal tendon sheath steroid injection was performed.  The patient is seen by themselves.    Since their last visit reports flare of right foot and shoulder pain over the last month.  Right lateral foot pain is the worst when lying down to sleep. They indicate that their current pain level is 6/10. They have tried Aleve, massage, traction on ankle, several steroid injections.     Injection History:  -Right peroneal tendon sheath steroid injection: 2/4/25  -Right subacromial steroid injection: 12/12/24, 12/14/23, 3/29/22, 9/7/21, 3/18/21, 12/8/2020  -Right glenohumeral steroid injection: 8/19/21, 11/17/2020  -Left ankle steroid injection: 12/14/23  -Left GT steroid injection: 8/13/19    Patient's past medical, surgical, social, and family histories were reviewed today and no changes are noted.    REVIEW OF SYSTEMS:  Constitutional: NEGATIVE for fever, chills, change in weight  Skin: NEGATIVE for worrisome rashes, moles or lesions  GI/: NEGATIVE for bowel or bladder changes  Neuro: NEGATIVE for weakness, dizziness or paresthesias    OBJECTIVE:  LMP 07/04/2019 (Approximate)    General: healthy, alert and in no distress  HEENT: no scleral icterus or conjunctival erythema  Skin: no suspicious lesions or rash. No jaundice.  CV: regular rhythm by palpation, no pedal edema  Resp: normal respiratory effort without conversational dyspnea   Psych: normal mood and affect  Gait: normal steady gait with appropriate coordination and balance  Neuro: normal light touch sensory exam of the extremities.    MSK:    RIGHT SHOULDER  Inspection:    no swelling, bruising, discoloration, or obvious deformity or asymmetry  Palpation:    Tender about the  supraspinatus insertion. Remainder of bony and tendinous landmarks are nontender.  Active Range of Motion:     Abduction 1650, FF 1650, , IR L4.     Strength:    Scapular plane abduction 5-/5,  ER 5/5, IR 5/5, biceps 5/5, triceps 5/5  Special Tests:    Positive: supraspinatus (empty can)    Negative: Neer's and Mattson'       RIGHT FOOT  Inspection:    no swelling over the dorsal and lateral midfoot  Palpation:    Tender about the peroneal tendon at lateral malleolus    No tenderness over the calcaneus or Lisfranc joint  Range of Motion:     Active toe flexion and extension  - intact    Active ankle range of motion - intact    Passive ankle range of motion - intact  Strength:    Intrinsic foot musculature strength - intact    Ankle strength - intact  Special Tests:    Positive: None    Negative: MTP stress and Lisfranc joint tenderness    Independent visualization of the below image:  Results for orders placed or performed in visit on 12/12/24   XR Foot Right G/E 3 Views    Narrative    EXAM: XR FOOT RIGHT G/E 3 VIEWS  LOCATION: [facility]  DATE: 12/12/2024    INDICATION:  Chronic foot pain, right, Chronic foot pain, right  COMPARISON: None.      Impression    IMPRESSION: Normal joint spaces and alignment. No fracture. Small plantar and Achilles' heel spurs.     EXAM: XR SHOULDER RIGHT G/E 3 VIEWS  DATE/TIME: 12/14/2023 4:03 PM      INDICATION: Right shoulder pain.   COMPARISON: None.                                                                      IMPRESSION:  1.  Mild/moderate right glenohumeral degenerative arthrosis.  Degenerative subchondral cystlike change in the inferior glenoid.  2.  Mild acromioclavicular arthrosis.  3.  No fracture.     MJ LYNNE MD   Patient's conditions were thoroughly discussed during today's visit with total time spent face-to-face with the patient and documentation being 20 minutes.    Tye Cam MD, [facility]    Disclaimer: This note consists of symbols derived from keyboarding, dictation and/or voice recognition software. As a result, there may be errors in the script that have gone undetected. Please consider this when interpreting information found in this chart.       Large Joint Injection/Arthocentesis: R subacromial bursa    Date/Time: 6/24/2025 10:05 AM    Performed by: Tye Cam MD  Authorized by: Tye Cam MD    Indications:  Pain  Needle Size:  25 G  Guidance: ultrasound    Approach:  Lateral  Location:  Shoulder      Site:  R subacromial bursa  Medications:  6 mg betamethasone acet & sod phos 6 (3-3) MG/ML; 2 mL ROPivacaine 5 MG/ML  Outcome:  Tolerated well, no immediate complications  Procedure discussed: discussed risks, benefits, and alternatives    Consent Given by:  Patient  Timeout: timeout called immediately prior to procedure    Prep: patient was prepped and draped in usual sterile fashion     Ultrasound images of procedure were permanently stored.     Patient reported some improvement of pain after the numbing portion right subacromial and left peroneal tendon steroid injections.  Ultrasound guided images were permanently stored.   Aftercare instructions given to patient.  Plan to follow-up as discussed above.     Tye Cam MD [facility]      Medium Joint Injection/Arthrocentesis    Date/Time: 6/24/2025 10:06 AM    Performed by: Tye Cam MD  Authorized by: Tye Cam MD    Indications:  Pain  Needle Size:  25 G  Guidance: ultrasound    Approach:  Lateral  Location comment:  Right peroneal tendon sheath  Medications:  6 mg betamethasone acet & sod phos 6 (3-3) MG/ML; 2 mL ROPivacaine 5 MG/ML  Outcome:  Tolerated well, no immediate complications  Procedure discussed: discussed risks, benefits, and alternatives    Consent Given by:  Patient  Timeout: timeout called immediately prior to procedure    Prep:  patient was prepped and draped in usual sterile fashion     Ultrasound images of procedure were permanently stored.           Again, thank you for allowing me to participate in the care of your patient.        Sincerely,        Tye Cam MD    Electronically signed

## 2025-06-24 NOTE — PROGRESS NOTES
ASSESSMENT & PLAN    Jeanne was seen today for pain, follow up, pain and follow up.    Diagnoses and all orders for this visit:    Chronic foot pain, right  -     POC US GUIDANCE NEEDLE PLACEMENT; Future  -     Medium Joint Injection/Arthrocentesis  -     Physical Therapy  Referral; Future    Chronic right shoulder pain  -     POC US GUIDANCE NEEDLE PLACEMENT; Future  -     Large Joint Injection/Arthocentesis: R subacromial bursa  -     Physical Therapy  Referral; Future      # Acute on Chronic Right Shoulder and Foot Pain: Jeanne Wasserman  was seen today for follow-up on right shoulder and foot pain. Symptoms had been going on for years, worsening over the past month in right foot and shoulder. On examination there are positive findings of tenderness to palpation over the rotator cuff tendon, lateral foot notably over the peroneal tendons. Symptoms affecting her ability to work requiring a form for accommodations. Previous steroid injections helped but given the closer timeline in efficacy, will add PT as well. Imaging findings showed mild foot arthritis, no fracture. Likely cause of patient's condition due to flare of irritated peroneal tendon, irritated rotator cuff tendon.  Lower concern for lumbar radiculopathy. Counseled patient on nature of condition and treatment options.  Given this plan as below, follow-up 1 mon as needed.     Image Findings: mild foot arthritis  Treatment: Activities as tolerated, continue home exercises, PT referral placed  Job: As tolerated  Medications/Injections: Limited tylenol/ibuprofen for pain for 1-2 weeks, Topical Voltaren gel, right subacromial bursa steroid injection, right peroneal tendon steroid injection   Follow-up: In one month if symptoms do not improve, sooner if worsening  Can consider repeat evaluation    -----    SUBJECTIVE:  Jeanne Wasserman is a 50 year old female who is seen in follow-up for right foot and right shoulder. They were last seen  2/4/25 and right peroneal tendon sheath steroid injection was performed.  The patient is seen by themselves.    Since their last visit reports flare of right foot and shoulder pain over the last month.  Right lateral foot pain is the worst when lying down to sleep. They indicate that their current pain level is 6/10. They have tried Aleve, massage, traction on ankle, several steroid injections.     Injection History:  -Right peroneal tendon sheath steroid injection: 2/4/25  -Right subacromial steroid injection: 12/12/24, 12/14/23, 3/29/22, 9/7/21, 3/18/21, 12/8/2020  -Right glenohumeral steroid injection: 8/19/21, 11/17/2020  -Left ankle steroid injection: 12/14/23  -Left GT steroid injection: 8/13/19    Patient's past medical, surgical, social, and family histories were reviewed today and no changes are noted.    REVIEW OF SYSTEMS:  Constitutional: NEGATIVE for fever, chills, change in weight  Skin: NEGATIVE for worrisome rashes, moles or lesions  GI/: NEGATIVE for bowel or bladder changes  Neuro: NEGATIVE for weakness, dizziness or paresthesias    OBJECTIVE:  LMP 07/04/2019 (Approximate)    General: healthy, alert and in no distress  HEENT: no scleral icterus or conjunctival erythema  Skin: no suspicious lesions or rash. No jaundice.  CV: regular rhythm by palpation, no pedal edema  Resp: normal respiratory effort without conversational dyspnea   Psych: normal mood and affect  Gait: normal steady gait with appropriate coordination and balance  Neuro: normal light touch sensory exam of the extremities.    MSK:    RIGHT SHOULDER  Inspection:    no swelling, bruising, discoloration, or obvious deformity or asymmetry  Palpation:    Tender about the supraspinatus insertion. Remainder of bony and tendinous landmarks are nontender.  Active Range of Motion:     Abduction 1650, FF 1650, , IR L4.     Strength:    Scapular plane abduction 5-/5,  ER 5/5, IR 5/5, biceps 5/5, triceps 5/5  Special Tests:    Positive:  supraspinatus (empty can)    Negative: Neer's and Mattson'       RIGHT FOOT  Inspection:    no swelling over the dorsal and lateral midfoot  Palpation:    Tender about the peroneal tendon at lateral malleolus    No tenderness over the calcaneus or Lisfranc joint  Range of Motion:     Active toe flexion and extension  - intact    Active ankle range of motion - intact    Passive ankle range of motion - intact  Strength:    Intrinsic foot musculature strength - intact    Ankle strength - intact  Special Tests:    Positive: None    Negative: MTP stress and Lisfranc joint tenderness    Independent visualization of the below image:  Results for orders placed or performed in visit on 12/12/24   XR Foot Right G/E 3 Views    Narrative    EXAM: XR FOOT RIGHT G/E 3 VIEWS  LOCATION: [facility]  DATE: 12/12/2024    INDICATION:  Chronic foot pain, right, Chronic foot pain, right  COMPARISON: None.      Impression    IMPRESSION: Normal joint spaces and alignment. No fracture. Small plantar and Achilles' heel spurs.     EXAM: XR SHOULDER RIGHT G/E 3 VIEWS  DATE/TIME: 12/14/2023 4:03 PM      INDICATION: Right shoulder pain.   COMPARISON: None.                                                                      IMPRESSION:  1.  Mild/moderate right glenohumeral degenerative arthrosis.  Degenerative subchondral cystlike change in the inferior glenoid.  2.  Mild acromioclavicular arthrosis.  3.  No fracture.     MJ LYNNE MD   Patient's conditions were thoroughly discussed during today's visit with total time spent face-to-face with the patient and documentation being 20 minutes.    Tye Cam MD, [facility]    Disclaimer: This note consists of symbols derived from keyboarding, dictation and/or voice recognition software. As a result, there may be errors in the script that have gone undetected. Please consider this when interpreting information found in this  chart.       Large Joint Injection/Arthocentesis: R subacromial bursa    Date/Time: 6/24/2025 10:05 AM    Performed by: Tye Cam MD  Authorized by: Tye Cam MD    Indications:  Pain  Needle Size:  25 G  Guidance: ultrasound    Approach:  Lateral  Location:  Shoulder      Site:  R subacromial bursa  Medications:  6 mg betamethasone acet & sod phos 6 (3-3) MG/ML; 2 mL ROPivacaine 5 MG/ML  Outcome:  Tolerated well, no immediate complications  Procedure discussed: discussed risks, benefits, and alternatives    Consent Given by:  Patient  Timeout: timeout called immediately prior to procedure    Prep: patient was prepped and draped in usual sterile fashion     Ultrasound images of procedure were permanently stored.     Patient reported some improvement of pain after the numbing portion right subacromial and left peroneal tendon steroid injections.  Ultrasound guided images were permanently stored.   Aftercare instructions given to patient.  Plan to follow-up as discussed above.     Tye Cam MD [facility]      Medium Joint Injection/Arthrocentesis    Date/Time: 6/24/2025 10:06 AM    Performed by: Tye Cam MD  Authorized by: Tye Cam MD    Indications:  Pain  Needle Size:  25 G  Guidance: ultrasound    Approach:  Lateral  Location comment:  Right peroneal tendon sheath  Medications:  6 mg betamethasone acet & sod phos 6 (3-3) MG/ML; 2 mL ROPivacaine 5 MG/ML  Outcome:  Tolerated well, no immediate complications  Procedure discussed: discussed risks, benefits, and alternatives    Consent Given by:  Patient  Timeout: timeout called immediately prior to procedure    Prep: patient was prepped and draped in usual sterile fashion     Ultrasound images of procedure were permanently stored.

## 2025-07-01 RX ORDER — BETAMETHASONE SODIUM PHOSPHATE AND BETAMETHASONE ACETATE 3; 3 MG/ML; MG/ML
6 INJECTION, SUSPENSION INTRA-ARTICULAR; INTRALESIONAL; INTRAMUSCULAR; SOFT TISSUE
Status: COMPLETED | OUTPATIENT
Start: 2025-06-24 | End: 2025-06-24

## 2025-07-01 RX ORDER — ROPIVACAINE HYDROCHLORIDE 5 MG/ML
2 INJECTION, SOLUTION EPIDURAL; INFILTRATION; PERINEURAL
Status: COMPLETED | OUTPATIENT
Start: 2025-06-24 | End: 2025-06-24

## 2025-07-02 ENCOUNTER — OFFICE VISIT (OUTPATIENT)
Dept: FAMILY MEDICINE | Facility: CLINIC | Age: 50
End: 2025-07-02
Payer: COMMERCIAL

## 2025-07-02 VITALS
TEMPERATURE: 98.1 F | BODY MASS INDEX: 37.32 KG/M2 | HEART RATE: 107 BPM | SYSTOLIC BLOOD PRESSURE: 121 MMHG | HEIGHT: 66 IN | RESPIRATION RATE: 16 BRPM | OXYGEN SATURATION: 100 % | WEIGHT: 232.2 LBS | DIASTOLIC BLOOD PRESSURE: 83 MMHG

## 2025-07-02 DIAGNOSIS — E66.09 CLASS 2 OBESITY DUE TO EXCESS CALORIES WITHOUT SERIOUS COMORBIDITY WITH BODY MASS INDEX (BMI) OF 35.0 TO 35.9 IN ADULT: ICD-10-CM

## 2025-07-02 DIAGNOSIS — F41.9 ANXIETY: ICD-10-CM

## 2025-07-02 DIAGNOSIS — E66.812 CLASS 2 OBESITY DUE TO EXCESS CALORIES WITHOUT SERIOUS COMORBIDITY WITH BODY MASS INDEX (BMI) OF 35.0 TO 35.9 IN ADULT: ICD-10-CM

## 2025-07-02 DIAGNOSIS — N95.1 MENOPAUSAL SYNDROME (HOT FLASHES): Primary | ICD-10-CM

## 2025-07-02 DIAGNOSIS — F33.1 MODERATE EPISODE OF RECURRENT MAJOR DEPRESSIVE DISORDER (H): ICD-10-CM

## 2025-07-02 DIAGNOSIS — G43.909 MIGRAINE WITHOUT STATUS MIGRAINOSUS, NOT INTRACTABLE, UNSPECIFIED MIGRAINE TYPE: ICD-10-CM

## 2025-07-02 DIAGNOSIS — M54.12 CERVICAL RADICULOPATHY: ICD-10-CM

## 2025-07-02 PROBLEM — E66.813 CLASS 3 OBESITY (H): Status: ACTIVE | Noted: 2025-07-02

## 2025-07-02 PROCEDURE — 99214 OFFICE O/P EST MOD 30 MIN: CPT | Performed by: INTERNAL MEDICINE

## 2025-07-02 PROCEDURE — G2211 COMPLEX E/M VISIT ADD ON: HCPCS | Performed by: INTERNAL MEDICINE

## 2025-07-02 RX ORDER — HYDROXYZINE HYDROCHLORIDE 25 MG/1
25-50 TABLET, FILM COATED ORAL EVERY 8 HOURS PRN
Qty: 90 TABLET | Refills: 0 | Status: SHIPPED | OUTPATIENT
Start: 2025-07-02

## 2025-07-02 RX ORDER — PHENTERMINE HYDROCHLORIDE 37.5 MG/1
37.5 TABLET ORAL
Qty: 30 TABLET | Refills: 3 | Status: SHIPPED | OUTPATIENT
Start: 2025-07-02

## 2025-07-02 RX ORDER — BUPROPION HYDROCHLORIDE 150 MG/1
150 TABLET ORAL EVERY MORNING
Qty: 30 TABLET | Refills: 0 | Status: SHIPPED | OUTPATIENT
Start: 2025-07-02

## 2025-07-02 ASSESSMENT — ANXIETY QUESTIONNAIRES
1. FEELING NERVOUS, ANXIOUS, OR ON EDGE: SEVERAL DAYS
IF YOU CHECKED OFF ANY PROBLEMS ON THIS QUESTIONNAIRE, HOW DIFFICULT HAVE THESE PROBLEMS MADE IT FOR YOU TO DO YOUR WORK, TAKE CARE OF THINGS AT HOME, OR GET ALONG WITH OTHER PEOPLE: SOMEWHAT DIFFICULT
GAD7 TOTAL SCORE: 7
7. FEELING AFRAID AS IF SOMETHING AWFUL MIGHT HAPPEN: SEVERAL DAYS
3. WORRYING TOO MUCH ABOUT DIFFERENT THINGS: SEVERAL DAYS
6. BECOMING EASILY ANNOYED OR IRRITABLE: SEVERAL DAYS
5. BEING SO RESTLESS THAT IT IS HARD TO SIT STILL: SEVERAL DAYS
2. NOT BEING ABLE TO STOP OR CONTROL WORRYING: SEVERAL DAYS
GAD7 TOTAL SCORE: 7

## 2025-07-02 ASSESSMENT — PATIENT HEALTH QUESTIONNAIRE - PHQ9
SUM OF ALL RESPONSES TO PHQ QUESTIONS 1-9: 4
5. POOR APPETITE OR OVEREATING: SEVERAL DAYS

## 2025-07-02 NOTE — PROGRESS NOTES
Assessment & Plan     Menopausal syndrome (hot flashes)  She is not currently on any hormone replacement therapy    Migraine without status migrainosus, not intractable, unspecified migraine type  She is on Topamax which controls her symptoms    Anxiety  She has had some stressful events in her life recently and this has caused the anxiety to go up  Her MARY score is 7  I will start her on hydroxyzine as needed  We discussed about the side effects of hydroxyzine  - hydrOXYzine HCl (ATARAX) 25 MG tablet; Take 1-2 tablets (25-50 mg) by mouth every 8 hours as needed for itching.    Moderate episode of recurrent major depressive disorder (H)  PHQ-9 score is 4  She is going to see a therapist starting soon  In the past she was on Prozac however she is concerned that if she goes back on this she will gain weight again  I discussed about alternative option of bupropion  Discussed about the side effects of same including tachycardia and hypertension  She is going to start this and going to monitor her blood pressure and heart rate  In a week if she is doing fine we can always increase the dose to 300 mg  - buPROPion (WELLBUTRIN XL) 150 MG 24 hr tablet; Take 1 tablet (150 mg) by mouth every morning.    Class 2 obesity due to excess calories without serious comorbidity with body mass index (BMI) of 35.0 to 35.9 in adult  She remains on Wegovy 2.4 mg  From February she has lost 10 pounds of weight  - Semaglutide-Weight Management (WEGOVY) 2.4 MG/0.75ML pen; Inject 2.4 mg subcutaneously once a week.  - phentermine (ADIPEX-P) 37.5 MG tablet; Take 1 tablet (37.5 mg) by mouth every morning (before breakfast).    Cervical radiculopathy  She has longstanding history of neck pain and that was radiating to her shoulder  She also has some rotator cuff issues in her shoulder  She recently got joint injection for this    She works for the [employer]  She is supposed to go to office for work at least 50% of the time however she  "is concerned that with the bright lights in the office her migraine is going to get worse and she does not have any independent options like in the past and has to know she had an office so along with that and the fact that there is apparently no ergonomic furniture currently available at workplace she is concerned that is going to exacerbate her bacK and neck issues   For this reason she wanted to be able to work remotely  I did sign the paperwork for 6 months            Subjective   Jeanne is a 50 year old, presenting for the following health issues:  Recheck Medication        7/2/2025     3:17 PM   Additional Questions   Roomed by Chey     History of Present Illness       Reason for visit:  Follow up wegovy and check up    She eats 2-3 servings of fruits and vegetables daily.She consumes 1 sweetened beverage(s) daily.She exercises with enough effort to increase her heart rate 20 to 29 minutes per day.  She exercises with enough effort to increase her heart rate 4 days per week.   She is taking medications regularly.          Medication Followup of Wegovy  Taking Medication as prescribed: yes  Side Effects:  None  Medication Helping Symptoms:  yes        Review of Systems  Constitutional, HEENT, cardiovascular, pulmonary, gi and gu systems are negative, except as otherwise noted.      Objective    /83 (BP Location: Right arm, Patient Position: Sitting, Cuff Size: Adult Large)   Pulse 107   Temp 98.1  F (36.7  C) (Oral)   Resp 16   Ht 1.665 m (5' 5.55\")   Wt 105.3 kg (232 lb 3.2 oz)   LMP 07/04/2019 (Approximate)   SpO2 100%   BMI 37.99 kg/m    Body mass index is 37.99 kg/m .  Physical Exam   GENERAL: alert and no distress  NECK: no adenopathy, no asymmetry, masses, or scars  RESP: lungs clear to auscultation - no rales, rhonchi or wheezes  MS: no gross musculoskeletal defects noted, no edema            Signed Electronically by: Sundeep Warren MD    "

## 2025-07-03 ENCOUNTER — TELEPHONE (OUTPATIENT)
Dept: FAMILY MEDICINE | Facility: CLINIC | Age: 50
End: 2025-07-03
Payer: COMMERCIAL

## 2025-07-03 NOTE — TELEPHONE ENCOUNTER
PRIOR AUTHORIZATION DENIED    Medication: PHENTERMINE HCL 37.5 MG PO TABS  Insurance Company: [employer] - Phone [phone] Fax [phone]  Denial Date: 7/3/2025  Denial Reason(s):       Appeal Information:         Patient Notified: NO

## 2025-08-07 DIAGNOSIS — F33.1 MODERATE EPISODE OF RECURRENT MAJOR DEPRESSIVE DISORDER (H): ICD-10-CM

## 2025-08-07 RX ORDER — BUPROPION HYDROCHLORIDE 150 MG/1
150 TABLET ORAL EVERY MORNING
Qty: 90 TABLET | Refills: 1 | Status: SHIPPED | OUTPATIENT
Start: 2025-08-07

## 2025-08-30 DIAGNOSIS — M54.2 NECK PAIN: ICD-10-CM

## 2025-09-02 RX ORDER — TOPIRAMATE 50 MG/1
50 TABLET, FILM COATED ORAL 2 TIMES DAILY
Qty: 180 TABLET | Refills: 1 | Status: SHIPPED | OUTPATIENT
Start: 2025-09-02